# Patient Record
Sex: FEMALE | Race: WHITE | NOT HISPANIC OR LATINO | Employment: FULL TIME | URBAN - METROPOLITAN AREA
[De-identification: names, ages, dates, MRNs, and addresses within clinical notes are randomized per-mention and may not be internally consistent; named-entity substitution may affect disease eponyms.]

---

## 2019-05-13 LAB
HPV, HIGH-RISK: NEGATIVE
PAP SMEAR: NORMAL

## 2019-05-21 ENCOUNTER — OFFICE VISIT (OUTPATIENT)
Dept: FAMILY MEDICINE | Facility: CLINIC | Age: 44
End: 2019-05-21
Payer: COMMERCIAL

## 2019-05-21 ENCOUNTER — TELEPHONE (OUTPATIENT)
Dept: FAMILY MEDICINE | Facility: CLINIC | Age: 44
End: 2019-05-21

## 2019-05-21 VITALS
DIASTOLIC BLOOD PRESSURE: 80 MMHG | HEIGHT: 63 IN | BODY MASS INDEX: 33.68 KG/M2 | HEART RATE: 89 BPM | SYSTOLIC BLOOD PRESSURE: 129 MMHG | WEIGHT: 190.06 LBS

## 2019-05-21 DIAGNOSIS — Z00.00 WELLNESS EXAMINATION: Primary | ICD-10-CM

## 2019-05-21 DIAGNOSIS — H91.90 HEARING LOSS, UNSPECIFIED HEARING LOSS TYPE, UNSPECIFIED LATERALITY: ICD-10-CM

## 2019-05-21 PROCEDURE — 99999 PR PBB SHADOW E&M-EST. PATIENT-LVL III: CPT | Mod: PBBFAC,,, | Performed by: FAMILY MEDICINE

## 2019-05-21 PROCEDURE — 99999 PR PBB SHADOW E&M-EST. PATIENT-LVL III: ICD-10-PCS | Mod: PBBFAC,,, | Performed by: FAMILY MEDICINE

## 2019-05-21 PROCEDURE — 99386 PR PREVENTIVE VISIT,NEW,40-64: ICD-10-PCS | Mod: S$GLB,,, | Performed by: FAMILY MEDICINE

## 2019-05-21 PROCEDURE — 99386 PREV VISIT NEW AGE 40-64: CPT | Mod: S$GLB,,, | Performed by: FAMILY MEDICINE

## 2019-05-21 NOTE — TELEPHONE ENCOUNTER
----- Message from Cande Blankenship sent at 5/21/2019 11:22 AM CDT -----  Contact: self at check out  Dr. Schwartz referred this patient to Audiology for a hearing test. I booked for Thursday 5-23. I need a referral put in and attached to the appointment please.

## 2019-05-21 NOTE — PROGRESS NOTES
"Subjective:       Patient ID: Bibiana Go is a 44 y.o. female.    Chief Complaint: wellness check up    This pt is new to me and to this clinic. The pt presents for her wellness exam.  The pt has no significant past medical history.  She ten to twelve years ago was treated for PSVT which was thought to be stress related.  Otherwise the pt feels well and has no acute complaints.    Review of Systems   Constitutional: Negative for activity change and unexpected weight change.   HENT: Positive for hearing loss. Negative for rhinorrhea and trouble swallowing.    Eyes: Negative for discharge and visual disturbance.   Respiratory: Negative for chest tightness and wheezing.    Cardiovascular: Negative for chest pain and palpitations.   Gastrointestinal: Negative for blood in stool, constipation, diarrhea and vomiting.   Endocrine: Negative for polydipsia and polyuria.   Genitourinary: Negative for difficulty urinating, dysuria, hematuria and menstrual problem.   Musculoskeletal: Negative for arthralgias, joint swelling and neck pain.   Neurological: Negative for weakness and headaches.   Psychiatric/Behavioral: Negative for confusion and dysphoric mood.       Objective:       Vitals:    05/21/19 1030   BP: 129/80   BP Location: Right arm   Patient Position: Sitting   BP Method: Medium (Manual)   Pulse: 89   Weight: 86.2 kg (190 lb 0.6 oz)   Height: 5' 3" (1.6 m)     Physical Exam   Constitutional: She is oriented to person, place, and time. She appears well-developed and well-nourished.   HENT:   Head: Normocephalic.   Eyes: Pupils are equal, round, and reactive to light. Conjunctivae and EOM are normal.   Neck: Normal range of motion. Neck supple. No thyromegaly present.   Cardiovascular: Normal rate, regular rhythm and normal heart sounds.   Pulmonary/Chest: Effort normal and breath sounds normal.   Abdominal: Soft. Bowel sounds are normal. There is no tenderness.   Musculoskeletal: Normal range of motion. She " exhibits no tenderness or deformity.   Lymphadenopathy:     She has no cervical adenopathy.   Neurological: She is alert and oriented to person, place, and time. She displays normal reflexes. No cranial nerve deficit. She exhibits normal muscle tone. Coordination normal.   Skin: Skin is warm and dry.   Psychiatric: She has a normal mood and affect. Her behavior is normal.       Assessment:       1. Wellness examination    2. Hearing loss, unspecified hearing loss type, unspecified laterality        Plan:       Bibiana was seen today for wellness check up.    Diagnoses and all orders for this visit:    Wellness examination  -     CBC auto differential; Future  -     Comprehensive metabolic panel; Future  -     Lipid panel; Future  -     TSH; Future  -     Vitamin D; Future    Hearing loss, unspecified hearing loss type, unspecified laterality  -     COMP AUDIOMETRY TRESHOLDEVAL; Future      During this visit, I reviewed the pt's history, medications, allergies, and problem list.

## 2019-05-22 DIAGNOSIS — H91.90 HEARING LOSS, UNSPECIFIED HEARING LOSS TYPE, UNSPECIFIED LATERALITY: Primary | ICD-10-CM

## 2019-05-23 ENCOUNTER — CLINICAL SUPPORT (OUTPATIENT)
Dept: AUDIOLOGY | Facility: CLINIC | Age: 44
End: 2019-05-23
Payer: COMMERCIAL

## 2019-05-23 ENCOUNTER — LAB VISIT (OUTPATIENT)
Dept: LAB | Facility: HOSPITAL | Age: 44
End: 2019-05-23
Attending: FAMILY MEDICINE
Payer: COMMERCIAL

## 2019-05-23 ENCOUNTER — OFFICE VISIT (OUTPATIENT)
Dept: OTOLARYNGOLOGY | Facility: CLINIC | Age: 44
End: 2019-05-23
Payer: COMMERCIAL

## 2019-05-23 VITALS — HEIGHT: 63 IN | WEIGHT: 190.06 LBS | BODY MASS INDEX: 33.68 KG/M2

## 2019-05-23 DIAGNOSIS — Z00.00 WELLNESS EXAMINATION: ICD-10-CM

## 2019-05-23 DIAGNOSIS — H93.12 TINNITUS, LEFT: Primary | ICD-10-CM

## 2019-05-23 DIAGNOSIS — H91.20 SUDDEN-ONSET SENSORINEURAL HEARING LOSS: Primary | ICD-10-CM

## 2019-05-23 LAB
25(OH)D3+25(OH)D2 SERPL-MCNC: 36 NG/ML (ref 30–96)
ALBUMIN SERPL BCP-MCNC: 3.9 G/DL (ref 3.5–5.2)
ALP SERPL-CCNC: 68 U/L (ref 55–135)
ALT SERPL W/O P-5'-P-CCNC: 13 U/L (ref 10–44)
ANION GAP SERPL CALC-SCNC: 6 MMOL/L (ref 8–16)
AST SERPL-CCNC: 14 U/L (ref 10–40)
BASOPHILS # BLD AUTO: 0.04 K/UL (ref 0–0.2)
BASOPHILS NFR BLD: 0.6 % (ref 0–1.9)
BILIRUB SERPL-MCNC: 0.5 MG/DL (ref 0.1–1)
BUN SERPL-MCNC: 12 MG/DL (ref 6–20)
CALCIUM SERPL-MCNC: 9.8 MG/DL (ref 8.7–10.5)
CHLORIDE SERPL-SCNC: 104 MMOL/L (ref 95–110)
CHOLEST SERPL-MCNC: 169 MG/DL (ref 120–199)
CHOLEST/HDLC SERPL: 3 {RATIO} (ref 2–5)
CO2 SERPL-SCNC: 26 MMOL/L (ref 23–29)
CREAT SERPL-MCNC: 0.7 MG/DL (ref 0.5–1.4)
DIFFERENTIAL METHOD: NORMAL
EOSINOPHIL # BLD AUTO: 0.1 K/UL (ref 0–0.5)
EOSINOPHIL NFR BLD: 2.3 % (ref 0–8)
ERYTHROCYTE [DISTWIDTH] IN BLOOD BY AUTOMATED COUNT: 13.1 % (ref 11.5–14.5)
EST. GFR  (AFRICAN AMERICAN): >60 ML/MIN/1.73 M^2
EST. GFR  (NON AFRICAN AMERICAN): >60 ML/MIN/1.73 M^2
GLUCOSE SERPL-MCNC: 89 MG/DL (ref 70–110)
HCT VFR BLD AUTO: 37.8 % (ref 37–48.5)
HDLC SERPL-MCNC: 56 MG/DL (ref 40–75)
HDLC SERPL: 33.1 % (ref 20–50)
HGB BLD-MCNC: 12.3 G/DL (ref 12–16)
IMM GRANULOCYTES # BLD AUTO: 0.01 K/UL (ref 0–0.04)
IMM GRANULOCYTES NFR BLD AUTO: 0.2 % (ref 0–0.5)
LDLC SERPL CALC-MCNC: 99.6 MG/DL (ref 63–159)
LYMPHOCYTES # BLD AUTO: 2 K/UL (ref 1–4.8)
LYMPHOCYTES NFR BLD: 31.8 % (ref 18–48)
MCH RBC QN AUTO: 30.2 PG (ref 27–31)
MCHC RBC AUTO-ENTMCNC: 32.5 G/DL (ref 32–36)
MCV RBC AUTO: 93 FL (ref 82–98)
MONOCYTES # BLD AUTO: 0.5 K/UL (ref 0.3–1)
MONOCYTES NFR BLD: 7.6 % (ref 4–15)
NEUTROPHILS # BLD AUTO: 3.6 K/UL (ref 1.8–7.7)
NEUTROPHILS NFR BLD: 57.5 % (ref 38–73)
NONHDLC SERPL-MCNC: 113 MG/DL
NRBC BLD-RTO: 0 /100 WBC
PLATELET # BLD AUTO: 324 K/UL (ref 150–350)
PMV BLD AUTO: 10.5 FL (ref 9.2–12.9)
POTASSIUM SERPL-SCNC: 4.5 MMOL/L (ref 3.5–5.1)
PROT SERPL-MCNC: 7.4 G/DL (ref 6–8.4)
RBC # BLD AUTO: 4.07 M/UL (ref 4–5.4)
SODIUM SERPL-SCNC: 136 MMOL/L (ref 136–145)
TRIGL SERPL-MCNC: 67 MG/DL (ref 30–150)
TSH SERPL DL<=0.005 MIU/L-ACNC: 1.44 UIU/ML (ref 0.4–4)
WBC # BLD AUTO: 6.17 K/UL (ref 3.9–12.7)

## 2019-05-23 PROCEDURE — 80053 COMPREHEN METABOLIC PANEL: CPT

## 2019-05-23 PROCEDURE — 80061 LIPID PANEL: CPT

## 2019-05-23 PROCEDURE — 99999 PR PBB SHADOW E&M-EST. PATIENT-LVL I: CPT | Mod: PBBFAC,,,

## 2019-05-23 PROCEDURE — 3008F BODY MASS INDEX DOCD: CPT | Mod: CPTII,S$GLB,, | Performed by: NURSE PRACTITIONER

## 2019-05-23 PROCEDURE — 92567 TYMPANOMETRY: CPT | Mod: S$GLB,,, | Performed by: AUDIOLOGIST-HEARING AID FITTER

## 2019-05-23 PROCEDURE — 92557 COMPREHENSIVE HEARING TEST: CPT | Mod: S$GLB,,, | Performed by: AUDIOLOGIST-HEARING AID FITTER

## 2019-05-23 PROCEDURE — 36415 COLL VENOUS BLD VENIPUNCTURE: CPT | Mod: PO

## 2019-05-23 PROCEDURE — 99203 OFFICE O/P NEW LOW 30 MIN: CPT | Mod: S$GLB,,, | Performed by: NURSE PRACTITIONER

## 2019-05-23 PROCEDURE — 99999 PR PBB SHADOW E&M-EST. PATIENT-LVL I: ICD-10-PCS | Mod: PBBFAC,,,

## 2019-05-23 PROCEDURE — 84443 ASSAY THYROID STIM HORMONE: CPT

## 2019-05-23 PROCEDURE — 3008F PR BODY MASS INDEX (BMI) DOCUMENTED: ICD-10-PCS | Mod: CPTII,S$GLB,, | Performed by: NURSE PRACTITIONER

## 2019-05-23 PROCEDURE — 99999 PR PBB SHADOW E&M-EST. PATIENT-LVL III: ICD-10-PCS | Mod: PBBFAC,,, | Performed by: NURSE PRACTITIONER

## 2019-05-23 PROCEDURE — 82306 VITAMIN D 25 HYDROXY: CPT

## 2019-05-23 PROCEDURE — 99203 PR OFFICE/OUTPT VISIT, NEW, LEVL III, 30-44 MIN: ICD-10-PCS | Mod: S$GLB,,, | Performed by: NURSE PRACTITIONER

## 2019-05-23 PROCEDURE — 92557 PR COMPREHENSIVE HEARING TEST: ICD-10-PCS | Mod: S$GLB,,, | Performed by: AUDIOLOGIST-HEARING AID FITTER

## 2019-05-23 PROCEDURE — 85025 COMPLETE CBC W/AUTO DIFF WBC: CPT

## 2019-05-23 PROCEDURE — 99999 PR PBB SHADOW E&M-EST. PATIENT-LVL III: CPT | Mod: PBBFAC,,, | Performed by: NURSE PRACTITIONER

## 2019-05-23 PROCEDURE — 92567 PR TYMPA2METRY: ICD-10-PCS | Mod: S$GLB,,, | Performed by: AUDIOLOGIST-HEARING AID FITTER

## 2019-05-23 RX ORDER — PREDNISONE 10 MG/1
TABLET ORAL
Qty: 42 TABLET | Refills: 0 | Status: SHIPPED | OUTPATIENT
Start: 2019-05-23 | End: 2019-06-04

## 2019-05-23 NOTE — PROGRESS NOTES
Bibiana Go was seen 05/23/2019 for an audiological evaluation. Patient complains of hearing loss AS>AD x 1 month. Pt reports her family feels her hearing is getting worse. When she listens to the phone with her left ear, she can't hear well and needs to switch ears.     Results reveal normal hearing from 250-8000Hz bilaterally with an asymmetrical component AS>AD.    Speech Reception Thresholds were  0 dBHL for the right ear and 5 dBHL for the left ear.    Word recognition scores were excellent bilaterally.   Tympanograms were Type A for the right ear and Type A for the left ear.    Audiogram results were reviewed in detail with patient and all questions were answered. Results will be reviewed by ENT at the completion of this note. Recommend further medical eval for the asymmetry,  repeat hearing test following Tx and hearing protection in loud noise.

## 2019-05-23 NOTE — PROGRESS NOTES
"Subjective:       Patient ID: Bibiana Go is a 44 y.o. female.    Chief Complaint: Hearing Loss    HPI   Patient was referred direct to audiology by Dr. AREN Schwartz. Audiology saw her today and noted an asymmetry. Patient reported that her unilateral hearing loss was recent and sudden-onset. Per our protocol, audiology notified ENT and we saw her in today on an urgent basis to workup a unilateral sudden-onset sensorineural hearing loss in the absence of any other (+) aural findings.   Patient reports her left-sided hearing decreased suddenly 3-4 weeks ago and has yet to return. She also reports a very noticeable left-sided tinnitus X 3-4 weeks.     Review of Systems   Constitutional: Negative.    HENT: Positive for hearing loss and tinnitus.    Eyes: Negative.    Respiratory: Negative.    Cardiovascular: Negative.    Gastrointestinal: Negative.    Musculoskeletal: Negative.    Skin: Negative.    Neurological: Negative.    Hematological: Negative.    Psychiatric/Behavioral: Negative.        Objective:      Physical Exam   Constitutional: She is oriented to person, place, and time. Vital signs are normal. She appears well-developed and well-nourished. She is cooperative. She does not appear ill. No distress.   HENT:   Head: Normocephalic and atraumatic.   Right Ear: Hearing, tympanic membrane, external ear and ear canal normal. Tympanic membrane is not erythematous. Tympanic membrane mobility is normal. No middle ear effusion.   Left Ear: Hearing, tympanic membrane, external ear and ear canal normal. Tympanic membrane is not erythematous. Tympanic membrane mobility is normal.  No middle ear effusion.   Nose: Nose normal.   Mouth/Throat: Uvula is midline, oropharynx is clear and moist and mucous membranes are normal.   Type "A" tympanograms consistent with well-pneumatized mesotympanums and absence of middle ear effusions AU   Eyes: EOM and lids are normal. Right eye exhibits no discharge. Left eye exhibits no " discharge. No scleral icterus.   Neck: Trachea normal and normal range of motion. Neck supple. No tracheal deviation present.   Cardiovascular: Normal rate.   Pulmonary/Chest: Effort normal. No stridor. No respiratory distress. She has no wheezes.   Musculoskeletal: Normal range of motion.   Neurological: She is alert and oriented to person, place, and time. She has normal strength. Coordination and gait normal.   Skin: Skin is warm, dry and intact. She is not diaphoretic. No cyanosis. No pallor.   Psychiatric: She has a normal mood and affect. Her speech is normal and behavior is normal. Judgment and thought content normal. Cognition and memory are normal.   Nursing note and vitals reviewed.       Assessment:       1. Sudden-onset sensorineural hearing loss        Plan:     Though findings are not substantial on audiogram, patient reports substantially noticeable subjective symptoms. Therefore we will proceed per protocol with prednisone taper and repeat audiogram in 3-4 weeks.     I discussed the risks of corticosteroid use directly with the patient. This includes increased appetite with weight change, increased blood pressure, decreased sleep, upset stomach (possible ulcers), pancreatitis, glucose/metabolism changes, mood changes/irritability, immunosuppression, acute glaucoma, osteoporosis, and rarely osteonecrosis. Patient understood and would like to proceed with treatment.

## 2019-05-23 NOTE — Clinical Note
Thank you for your referral to audiology. Ms Go has normal hearing but an asymmetrical hearing loss was discovered that needed further examination from ENT due to the sudden nature with which it occurred. She saw Germania Appiah immediately following this encounter to try Tx to see if her hearing improves. Tx for sudden hearing loss is recommended as soon as the loss is discovered.

## 2019-05-31 DIAGNOSIS — Z12.39 BREAST CANCER SCREENING: ICD-10-CM

## 2019-06-25 ENCOUNTER — TELEPHONE (OUTPATIENT)
Dept: AUDIOLOGY | Facility: CLINIC | Age: 44
End: 2019-06-25

## 2019-06-25 NOTE — TELEPHONE ENCOUNTER
FIDELIAOV for the patient to call back so we can schedule her.    Attempting to schedule her on Friday 06/28 in the afternoon for an AUDIO ONLY      ----- Message from Payam Nichole sent at 6/25/2019 11:16 AM CDT -----  Type: Needs Medical Advice    Who Called:  Patient    Best Call Back Number: [phone]  Additional Information: Patient states that she would like a callback to schedule a hearing test.  [facility] would not allow me to schedule.

## 2019-06-28 ENCOUNTER — CLINICAL SUPPORT (OUTPATIENT)
Dept: AUDIOLOGY | Facility: CLINIC | Age: 44
End: 2019-06-28
Payer: COMMERCIAL

## 2019-06-28 DIAGNOSIS — D33.3 LEFT ACOUSTIC NEUROMA: Primary | ICD-10-CM

## 2019-06-28 DIAGNOSIS — H90.3 ASYMMETRICAL SENSORINEURAL HEARING LOSS: ICD-10-CM

## 2019-06-28 PROCEDURE — 92567 TYMPANOMETRY: CPT | Mod: S$GLB,,, | Performed by: AUDIOLOGIST

## 2019-06-28 PROCEDURE — 92557 PR COMPREHENSIVE HEARING TEST: ICD-10-PCS | Mod: S$GLB,,, | Performed by: AUDIOLOGIST

## 2019-06-28 PROCEDURE — 92557 COMPREHENSIVE HEARING TEST: CPT | Mod: S$GLB,,, | Performed by: AUDIOLOGIST

## 2019-06-28 PROCEDURE — 92567 PR TYMPA2METRY: ICD-10-PCS | Mod: S$GLB,,, | Performed by: AUDIOLOGIST

## 2019-06-28 PROCEDURE — 99999 PR PBB SHADOW E&M-EST. PATIENT-LVL I: ICD-10-PCS | Mod: PBBFAC,,,

## 2019-06-28 PROCEDURE — 99999 PR PBB SHADOW E&M-EST. PATIENT-LVL I: CPT | Mod: PBBFAC,,,

## 2019-07-01 ENCOUNTER — TELEPHONE (OUTPATIENT)
Dept: ADMINISTRATIVE | Facility: HOSPITAL | Age: 44
End: 2019-07-01

## 2019-07-01 ENCOUNTER — PATIENT MESSAGE (OUTPATIENT)
Dept: ADMINISTRATIVE | Facility: HOSPITAL | Age: 44
End: 2019-07-01

## 2019-07-01 NOTE — LETTER
July 1, 2019    Dr Bales             [facility]  [address]  [facility] [address]  Phone: [phone] July 1, 2019     Patient: Bibiana Go    YOB: 1975   Date of Visit: 7/1/2019       To Whom It May Concern:                                                                                    [facility]    We are seeing Bibiana Go in the clinic today at [facility].  PATRICK Scwhartz MD is their PCP.  She/He has an outstanding lab/procedure at this time when reviewing their chart.  To help with our Health Maintenance records will you please supply the following:      [x]  Mammogram                                                   [x]  Pap Smear                                                                                                Please Fax to [facility] at [phone]    Thank you for your help, Dustin Hill LPN-[facility].  If I can be of any assistance you can call at [phone]        [facility]         If you have any questions or concerns, please don't hesitate to call.    Sincerely,        PATRICK Schwartz MD

## 2019-07-01 NOTE — LETTER
July 1, 2019    Dr Bales             [facility]  [address]  [facility] [address]  Phone: [phone] July 1, 2019     Patient: Bibiana Go    YOB: 1975   Date of Visit: 7/1/2019       To Whom It May Concern:                                                                                        [facility]    We are seeing Bibiana Go in the clinic today at [facility].  PATRICK Schwartz MD is their PCP.  She/He has an outstanding lab/procedure at this time when reviewing their chart.  To help with our Health Maintenance records will you please supply the following:      []  Mammogram                                                []  Colonoscopy   []  Pap Smear                                                    []  Outside Lab Results   []  Dexa scans                                                   []  Eye Exam   []  Foot Exam                                                     [] Other___________   []  Outside Immunizations                                              Please Fax to [facility] at [phone]    Thank you for your help, Dustin Hill LPN-[facility].  If I can be of any assistance you can call at [phone]        [facility]         If you have any questions or concerns, please don't hesitate to call.    Sincerely,        PATRICK Schwartz MD

## 2019-07-03 ENCOUNTER — TELEPHONE (OUTPATIENT)
Dept: ADMINISTRATIVE | Facility: HOSPITAL | Age: 44
End: 2019-07-03

## 2019-07-09 ENCOUNTER — PATIENT MESSAGE (OUTPATIENT)
Dept: OTOLARYNGOLOGY | Facility: CLINIC | Age: 44
End: 2019-07-09

## 2019-07-18 PROBLEM — N93.9 ABNORMAL UTERINE BLEEDING: Status: ACTIVE | Noted: 2019-07-18

## 2019-08-16 ENCOUNTER — TELEPHONE (OUTPATIENT)
Dept: OTOLARYNGOLOGY | Facility: CLINIC | Age: 44
End: 2019-08-16

## 2019-08-19 ENCOUNTER — OFFICE VISIT (OUTPATIENT)
Dept: OTOLARYNGOLOGY | Facility: CLINIC | Age: 44
End: 2019-08-19
Payer: COMMERCIAL

## 2019-08-19 VITALS — HEIGHT: 64 IN | WEIGHT: 185.44 LBS | BODY MASS INDEX: 31.66 KG/M2

## 2019-08-19 DIAGNOSIS — D33.3 VESTIBULAR SCHWANNOMA: Primary | ICD-10-CM

## 2019-08-19 PROCEDURE — 99999 PR PBB SHADOW E&M-EST. PATIENT-LVL II: CPT | Mod: PBBFAC,,, | Performed by: OTOLARYNGOLOGY

## 2019-08-19 PROCEDURE — 3008F BODY MASS INDEX DOCD: CPT | Mod: CPTII,S$GLB,, | Performed by: OTOLARYNGOLOGY

## 2019-08-19 PROCEDURE — 3008F PR BODY MASS INDEX (BMI) DOCUMENTED: ICD-10-PCS | Mod: CPTII,S$GLB,, | Performed by: OTOLARYNGOLOGY

## 2019-08-19 PROCEDURE — 99215 PR OFFICE/OUTPT VISIT, EST, LEVL V, 40-54 MIN: ICD-10-PCS | Mod: S$GLB,,, | Performed by: OTOLARYNGOLOGY

## 2019-08-19 PROCEDURE — 99215 OFFICE O/P EST HI 40 MIN: CPT | Mod: S$GLB,,, | Performed by: OTOLARYNGOLOGY

## 2019-08-19 PROCEDURE — 99999 PR PBB SHADOW E&M-EST. PATIENT-LVL II: ICD-10-PCS | Mod: PBBFAC,,, | Performed by: OTOLARYNGOLOGY

## 2019-08-19 RX ORDER — IBUPROFEN 200 MG
400 TABLET ORAL EVERY 6 HOURS PRN
COMMUNITY

## 2019-08-19 NOTE — PROGRESS NOTES
Otology/Neurotology Consultation Report       Chief Complaint: incidentally found vestibular schwannoma left    History of Present Illness: 44 y.o.  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 The patient reports that she started noticing a slight difficulty hearing on the left about 6 months ago. She was seen by her PCP who initially gave her steroids which did not improve it. She then underwent an MRI which revealed a 1 x 2.1 cm mass of the left IAC. She was referred here for further evaluation. She denies dizziness, headaches, or tinnitus. No family history of AN. No prior ear surgery. Ref Germania Appiah.    Review of Systems   Constitutional: Negative for chills and fever.   HENT: Positive for hearing loss. Negative for ear discharge, ear pain, sore throat and tinnitus.    Eyes: Negative for double vision and photophobia.   Respiratory: Negative for cough, wheezing and stridor.    Cardiovascular: Negative for chest pain and palpitations.   Gastrointestinal: Negative for nausea and vomiting.   Genitourinary: Negative for dysuria and urgency.   Musculoskeletal: Negative for myalgias and neck pain.   Skin: Negative for itching and rash.   Neurological: Negative for dizziness and headaches.          Past Medical History: Patient has a past medical history of Acoustic neuroma, Asthma, and History of PSVT (paroxysmal supraventricular tachycardia).    Past Surgical History: Patient has a past surgical history that includes  section;  section with tubal ligation; Robot-assisted laparoscopic hysterectomy (N/A, 2019); Cystoscopy (N/A, 2019); Robot-assisted salpingectomy (Bilateral, 2019); and Robot-assisted laparoscopic oophorectomy (Right, 2019).    Social History: Patient reports that she has never smoked. She has never used smokeless tobacco. She reports that she drinks alcohol. She reports that she does not use drugs.    Family History: family history  includes Cancer in her maternal aunt and paternal aunt; Diabetes in her father; Heart disease in her father; Hyperlipidemia in her mother.    Medications:   Current Outpatient Medications on File Prior to Visit   Medication Sig Dispense Refill    ibuprofen (ADVIL,MOTRIN) 200 MG tablet Take 400 mg by mouth every 6 (six) hours as needed for Pain.       No current facility-administered medications on file prior to visit.          Allergies: Patient has No Known Allergies.    Physical Exam   Constitutional: She is oriented to person, place, and time and well-developed, well-nourished, and in no distress.   HENT:   Head: Normocephalic and atraumatic.   Right Ear: Tympanic membrane, external ear and ear canal normal.   Left Ear: Tympanic membrane, external ear and ear canal normal.   Nose: Nose normal. No mucosal edema or rhinorrhea.   Mouth/Throat: Uvula is midline, oropharynx is clear and moist and mucous membranes are normal.   Eyes: Pupils are equal, round, and reactive to light. EOM are normal.   Neck: Normal range of motion. Neck supple.   Pulmonary/Chest: Effort normal. No respiratory distress.   Neurological: She is oriented to person, place, and time.   Skin: Skin is warm and dry.               MRI IAC w/ and w/o contrast 6/28/19: 10 x 21 x 9 mm avidly enhancing lesion in the left cerebellopontine angle extending into the left internal auditory canal, likely a vestibular schwannoma.               Bibiana was seen today for hearing loss and other.    Diagnoses and all orders for this visit:    Vestibular schwannoma AS    Long talk.    Disc obs, XRT, Microsurg. Considering age/ size would lean to MS. She would like to wait for now.    Repeat MRI in 6 months.    RTC then.

## 2020-02-19 ENCOUNTER — PATIENT MESSAGE (OUTPATIENT)
Dept: OTOLARYNGOLOGY | Facility: CLINIC | Age: 45
End: 2020-02-19

## 2020-02-19 DIAGNOSIS — D33.3 UNILATERAL VESTIBULAR SCHWANNOMA: Primary | ICD-10-CM

## 2020-03-03 ENCOUNTER — PATIENT MESSAGE (OUTPATIENT)
Dept: OTOLARYNGOLOGY | Facility: CLINIC | Age: 45
End: 2020-03-03

## 2020-03-05 NOTE — TELEPHONE ENCOUNTER
----- Message from Germania Appiah NP sent at 3/3/2020  2:03 PM CST -----  Enhancing vestibular schwannoma (10 x 21 x 9 mm) is entirely unchanged in size compared to the prior examination.    Structures of the membranous labyrinth are normal bilaterally.

## 2020-10-05 ENCOUNTER — PATIENT MESSAGE (OUTPATIENT)
Dept: ADMINISTRATIVE | Facility: HOSPITAL | Age: 45
End: 2020-10-05

## 2021-01-04 ENCOUNTER — PATIENT MESSAGE (OUTPATIENT)
Dept: ADMINISTRATIVE | Facility: HOSPITAL | Age: 46
End: 2021-01-04

## 2021-01-06 DIAGNOSIS — Z12.31 OTHER SCREENING MAMMOGRAM: ICD-10-CM

## 2021-04-06 ENCOUNTER — PATIENT MESSAGE (OUTPATIENT)
Dept: ADMINISTRATIVE | Facility: HOSPITAL | Age: 46
End: 2021-04-06

## 2021-07-07 ENCOUNTER — PATIENT MESSAGE (OUTPATIENT)
Dept: ADMINISTRATIVE | Facility: HOSPITAL | Age: 46
End: 2021-07-07

## 2021-07-09 ENCOUNTER — IMMUNIZATION (OUTPATIENT)
Dept: FAMILY MEDICINE | Facility: CLINIC | Age: 46
End: 2021-07-09
Payer: COMMERCIAL

## 2021-07-09 DIAGNOSIS — Z23 NEED FOR VACCINATION: Primary | ICD-10-CM

## 2021-07-09 PROCEDURE — 91300 COVID-19, MRNA, LNP-S, PF, 30 MCG/0.3 ML DOSE VACCINE: CPT | Mod: PBBFAC | Performed by: FAMILY MEDICINE

## 2021-07-30 ENCOUNTER — IMMUNIZATION (OUTPATIENT)
Dept: FAMILY MEDICINE | Facility: CLINIC | Age: 46
End: 2021-07-30
Payer: COMMERCIAL

## 2021-07-30 DIAGNOSIS — Z23 NEED FOR VACCINATION: Primary | ICD-10-CM

## 2021-07-30 PROCEDURE — 0002A COVID-19, MRNA, LNP-S, PF, 30 MCG/0.3 ML DOSE VACCINE: CPT | Mod: PBBFAC | Performed by: FAMILY MEDICINE

## 2021-07-30 PROCEDURE — 91300 COVID-19, MRNA, LNP-S, PF, 30 MCG/0.3 ML DOSE VACCINE: CPT | Mod: PBBFAC | Performed by: FAMILY MEDICINE

## 2023-04-03 ENCOUNTER — LAB VISIT (OUTPATIENT)
Dept: LAB | Facility: HOSPITAL | Age: 48
End: 2023-04-03
Attending: OBSTETRICS & GYNECOLOGY
Payer: COMMERCIAL

## 2023-04-03 ENCOUNTER — OFFICE VISIT (OUTPATIENT)
Dept: OBSTETRICS AND GYNECOLOGY | Facility: CLINIC | Age: 48
End: 2023-04-03
Payer: COMMERCIAL

## 2023-04-03 VITALS
BODY MASS INDEX: 35.7 KG/M2 | HEIGHT: 62 IN | DIASTOLIC BLOOD PRESSURE: 88 MMHG | RESPIRATION RATE: 14 BRPM | SYSTOLIC BLOOD PRESSURE: 146 MMHG | WEIGHT: 194 LBS

## 2023-04-03 DIAGNOSIS — R53.83 FATIGUE, UNSPECIFIED TYPE: ICD-10-CM

## 2023-04-03 DIAGNOSIS — Z12.31 ENCOUNTER FOR SCREENING MAMMOGRAM FOR MALIGNANT NEOPLASM OF BREAST: ICD-10-CM

## 2023-04-03 DIAGNOSIS — Z01.419 ENCOUNTER FOR ANNUAL ROUTINE GYNECOLOGICAL EXAMINATION: ICD-10-CM

## 2023-04-03 DIAGNOSIS — Z12.4 SCREENING FOR MALIGNANT NEOPLASM OF CERVIX: Primary | ICD-10-CM

## 2023-04-03 LAB
BASOPHILS # BLD AUTO: 0.06 K/UL (ref 0–0.2)
BASOPHILS NFR BLD: 0.7 % (ref 0–1.9)
DIFFERENTIAL METHOD: NORMAL
EOSINOPHIL # BLD AUTO: 0.2 K/UL (ref 0–0.5)
EOSINOPHIL NFR BLD: 2 % (ref 0–8)
ERYTHROCYTE [DISTWIDTH] IN BLOOD BY AUTOMATED COUNT: 11.9 % (ref 11.5–14.5)
ESTRADIOL SERPL-MCNC: 85 PG/ML
FSH SERPL-ACNC: 7.14 MIU/ML
HCT VFR BLD AUTO: 41.5 % (ref 37–48.5)
HGB BLD-MCNC: 13.3 G/DL (ref 12–16)
IMM GRANULOCYTES # BLD AUTO: 0.01 K/UL (ref 0–0.04)
IMM GRANULOCYTES NFR BLD AUTO: 0.1 % (ref 0–0.5)
LYMPHOCYTES # BLD AUTO: 2.1 K/UL (ref 1–4.8)
LYMPHOCYTES NFR BLD: 24.5 % (ref 18–48)
MCH RBC QN AUTO: 30.9 PG (ref 27–31)
MCHC RBC AUTO-ENTMCNC: 32 G/DL (ref 32–36)
MCV RBC AUTO: 96 FL (ref 82–98)
MONOCYTES # BLD AUTO: 0.5 K/UL (ref 0.3–1)
MONOCYTES NFR BLD: 5.9 % (ref 4–15)
NEUTROPHILS # BLD AUTO: 5.8 K/UL (ref 1.8–7.7)
NEUTROPHILS NFR BLD: 66.8 % (ref 38–73)
NRBC BLD-RTO: 0 /100 WBC
PLATELET # BLD AUTO: 330 K/UL (ref 150–450)
PMV BLD AUTO: 9.9 FL (ref 9.2–12.9)
RBC # BLD AUTO: 4.31 M/UL (ref 4–5.4)
T4 FREE SERPL-MCNC: 0.94 NG/DL (ref 0.71–1.51)
TSH SERPL DL<=0.005 MIU/L-ACNC: 1.14 UIU/ML (ref 0.4–4)
WBC # BLD AUTO: 8.65 K/UL (ref 3.9–12.7)

## 2023-04-03 PROCEDURE — 99386 PR PREVENTIVE VISIT,NEW,40-64: ICD-10-PCS | Mod: S$GLB,,, | Performed by: OBSTETRICS & GYNECOLOGY

## 2023-04-03 PROCEDURE — 82670 ASSAY OF TOTAL ESTRADIOL: CPT | Performed by: OBSTETRICS & GYNECOLOGY

## 2023-04-03 PROCEDURE — 84443 ASSAY THYROID STIM HORMONE: CPT | Performed by: OBSTETRICS & GYNECOLOGY

## 2023-04-03 PROCEDURE — 99999 PR PBB SHADOW E&M-EST. PATIENT-LVL III: CPT | Mod: PBBFAC,,, | Performed by: OBSTETRICS & GYNECOLOGY

## 2023-04-03 PROCEDURE — 84439 ASSAY OF FREE THYROXINE: CPT | Performed by: OBSTETRICS & GYNECOLOGY

## 2023-04-03 PROCEDURE — 99386 PREV VISIT NEW AGE 40-64: CPT | Mod: S$GLB,,, | Performed by: OBSTETRICS & GYNECOLOGY

## 2023-04-03 PROCEDURE — 99999 PR PBB SHADOW E&M-EST. PATIENT-LVL III: ICD-10-PCS | Mod: PBBFAC,,, | Performed by: OBSTETRICS & GYNECOLOGY

## 2023-04-03 PROCEDURE — 3077F PR MOST RECENT SYSTOLIC BLOOD PRESSURE >= 140 MM HG: ICD-10-PCS | Mod: CPTII,S$GLB,, | Performed by: OBSTETRICS & GYNECOLOGY

## 2023-04-03 PROCEDURE — 36415 COLL VENOUS BLD VENIPUNCTURE: CPT | Mod: PN | Performed by: OBSTETRICS & GYNECOLOGY

## 2023-04-03 PROCEDURE — 1160F PR REVIEW ALL MEDS BY PRESCRIBER/CLIN PHARMACIST DOCUMENTED: ICD-10-PCS | Mod: CPTII,S$GLB,, | Performed by: OBSTETRICS & GYNECOLOGY

## 2023-04-03 PROCEDURE — 83001 ASSAY OF GONADOTROPIN (FSH): CPT | Performed by: OBSTETRICS & GYNECOLOGY

## 2023-04-03 PROCEDURE — 1159F MED LIST DOCD IN RCRD: CPT | Mod: CPTII,S$GLB,, | Performed by: OBSTETRICS & GYNECOLOGY

## 2023-04-03 PROCEDURE — 3079F DIAST BP 80-89 MM HG: CPT | Mod: CPTII,S$GLB,, | Performed by: OBSTETRICS & GYNECOLOGY

## 2023-04-03 PROCEDURE — 1159F PR MEDICATION LIST DOCUMENTED IN MEDICAL RECORD: ICD-10-PCS | Mod: CPTII,S$GLB,, | Performed by: OBSTETRICS & GYNECOLOGY

## 2023-04-03 PROCEDURE — 1160F RVW MEDS BY RX/DR IN RCRD: CPT | Mod: CPTII,S$GLB,, | Performed by: OBSTETRICS & GYNECOLOGY

## 2023-04-03 PROCEDURE — 85025 COMPLETE CBC W/AUTO DIFF WBC: CPT | Performed by: OBSTETRICS & GYNECOLOGY

## 2023-04-03 PROCEDURE — 3079F PR MOST RECENT DIASTOLIC BLOOD PRESSURE 80-89 MM HG: ICD-10-PCS | Mod: CPTII,S$GLB,, | Performed by: OBSTETRICS & GYNECOLOGY

## 2023-04-03 PROCEDURE — 3008F PR BODY MASS INDEX (BMI) DOCUMENTED: ICD-10-PCS | Mod: CPTII,S$GLB,, | Performed by: OBSTETRICS & GYNECOLOGY

## 2023-04-03 PROCEDURE — 3077F SYST BP >= 140 MM HG: CPT | Mod: CPTII,S$GLB,, | Performed by: OBSTETRICS & GYNECOLOGY

## 2023-04-03 PROCEDURE — 3008F BODY MASS INDEX DOCD: CPT | Mod: CPTII,S$GLB,, | Performed by: OBSTETRICS & GYNECOLOGY

## 2023-04-03 NOTE — PROGRESS NOTES
Chief Complaint   Patient presents with    Well Woman       History and Physical:  Patient's last menstrual period was 2019 (exact date).       Bibiana Go is a 47 y.o. WF who presents today for her routine annual GYN exam. The patient has no Gynecology complaints today. Prev hy2019      Allergies: Review of patient's allergies indicates:  No Known Allergies    Past Medical History:   Diagnosis Date    Acoustic neuroma     Asthma     as a child    History of PSVT (paroxysmal supraventricular tachycardia)     no longer needs medication       Past Surgical History:   Procedure Laterality Date     SECTION       SECTION WITH TUBAL LIGATION      CYSTOSCOPY N/A 2019    Procedure: CYSTOSCOPY;  Surgeon: Francisco Javier Bales MD;  Location: [facility] OR;  Service: OB/GYN;  Laterality: N/A;    HYSTERECTOMY  2019    OOPHORECTOMY  2019    one removed w/Hysterectomy    ROBOT-ASSISTED LAPAROSCOPIC HYSTERECTOMY N/A 2019    Procedure: ROBOTIC HYSTERECTOMY;  Surgeon: Francisco Javier Bales MD;  Location: [facility] OR;  Service: OB/GYN;  Laterality: N/A;    ROBOT-ASSISTED LAPAROSCOPIC OOPHORECTOMY Right 2019    Procedure: ROBOTIC OOPHORECTOMY;  Surgeon: Francisco Javier Bales MD;  Location: [facility] OR;  Service: OB/GYN;  Laterality: Right;    ROBOT-ASSISTED SALPINGECTOMY Bilateral 2019    Procedure: ROBOTIC SALPINGECTOMY;  Surgeon: Francisco Javier Bales MD;  Location: [facility] OR;  Service: OB/GYN;  Laterality: Bilateral;       MEDS:   Current Outpatient Medications on File Prior to Visit   Medication Sig Dispense Refill    ibuprofen (ADVIL,MOTRIN) 200 MG tablet Take 400 mg by mouth every 6 (six) hours as needed for Pain.       No current facility-administered medications on file prior to visit.       OB History          2    Para   2    Term   2            AB        Living             SAB        IAB        Ectopic        Multiple        Live Births                     Social History  "    Socioeconomic History    Marital status:     Number of children: 2   Occupational History    Occupation:  at [employer]   Tobacco Use    Smoking status: Never    Smokeless tobacco: Never   Substance and Sexual Activity    Alcohol use: Yes     Comment: occasionally    Drug use: Never    Sexual activity: Yes     Partners: Male       Family History   Problem Relation Age of Onset    Hyperlipidemia Mother     Diabetes Father     Heart disease Father     Breast cancer Maternal Aunt 45    Breast cancer Paternal Aunt 42         Past medical and surgical history reviewed.   I have reviewed the patient's medical history in detail and updated the computerized patient record.        Review of System:   General: no chills, fever, night sweats, weight gain or weight loss  Psychological: no depression or suicidal ideation  Breasts: no new or changing breast lumps, nipple discharge or masses.  Respiratory: no cough, shortness of breath, or wheezing  Cardiovascular: no chest pain or dyspnea on exertion  Gastrointestinal: no abdominal pain, change in bowel habits, or black or bloody stools  Genito-Urinary: no incontinence, urinary frequency/urgency or vulvar/vaginal symptoms, pelvic pain or abnormal vaginal bleeding.  Musculoskeletal: no gait disturbance or muscular weakness      Physical Exam:   BP (!) 146/88   Resp 14   Ht 5' 2" (1.575 m)   Wt 88 kg (194 lb 0.1 oz)   LMP 06/18/2019 (Exact Date)   BMI 35.48 kg/m²   Constitutional: She is oriented to person, place, and time. She appears well-developed and well-nourished. No distress.  HENT:   Head: Normocephalic and atraumatic.   Eyes: Conjunctivae and EOM are normal. No scleral icterus.   Neck: Normal range of motion. Neck supple. No tracheal deviation present.   Cardiovascular: Normal rate.    Pulmonary/Chest: Effort normal. No respiratory distress. She exhibits no tenderness.  Breasts: are symmetrical.no masses    Right breast exhibits no inverted nipple, " no mass, no nipple discharge, no skin change and no tenderness.   Left breast exhibits no inverted nipple, no mass, no nipple discharge, no skin change and no tenderness.  Abdominal: Soft. She exhibits no distension and no mass. There is no tenderness. There is no rebound and no guarding.   Genitourinary:    External rectal exam shows no thrombosed external hemorrhoids.    Pelvic exam was performed with patient supine.   No labial fusion.   There is no rash, lesion or injury on the right labia.   There is no rash, lesion or injury on the left labia.   No bleeding and no signs of injury around the vaginal introitus, urethra is without lesions and well supported.    No vaginal discharge found.    No significant Cystocele, Enterocele or rectocele, and cuff well supported.   Bimanual exam:   The urethra and vagina are without palpable masses or tenderness.   Uterus and cervix are surgically absents, vaginal cuff is intact and well supported.   Right adnexum displays no mass and no tenderness.   Left adnexum displays no mass and no tenderness.  Musculoskeletal: Normal range of motion.   Lymphadenopathy: No inguinal adenopathy present.   Neurological: She is alert and oriented to person, place, and time. Coordination normal.   Skin: Skin is warm and dry. She is not diaphoretic.   Psychiatric: She has a normal mood and affect.        Assessment:   Normal annual GYN exam  1. Screening for malignant neoplasm of cervix        2. Encounter for screening mammogram for malignant neoplasm of breast        3. Encounter for annual routine gynecological examination          Prev hyst and RSO  Fatigue, anxiety  Plan:   Consider anxiolytic  Estradiol, FSH, TSH, T4, CBC  PAP  Not Needed  Mammogram  Follow up in 4 weeks

## 2023-05-01 ENCOUNTER — OFFICE VISIT (OUTPATIENT)
Dept: OBSTETRICS AND GYNECOLOGY | Facility: CLINIC | Age: 48
End: 2023-05-01
Payer: COMMERCIAL

## 2023-05-01 VITALS
DIASTOLIC BLOOD PRESSURE: 78 MMHG | WEIGHT: 189.81 LBS | BODY MASS INDEX: 34.93 KG/M2 | RESPIRATION RATE: 14 BRPM | HEIGHT: 62 IN | SYSTOLIC BLOOD PRESSURE: 122 MMHG

## 2023-05-01 DIAGNOSIS — F41.9 ANXIETY: ICD-10-CM

## 2023-05-01 DIAGNOSIS — R53.83 FATIGUE, UNSPECIFIED TYPE: Primary | ICD-10-CM

## 2023-05-01 PROCEDURE — 3078F PR MOST RECENT DIASTOLIC BLOOD PRESSURE < 80 MM HG: ICD-10-PCS | Mod: CPTII,S$GLB,, | Performed by: OBSTETRICS & GYNECOLOGY

## 2023-05-01 PROCEDURE — 3074F SYST BP LT 130 MM HG: CPT | Mod: CPTII,S$GLB,, | Performed by: OBSTETRICS & GYNECOLOGY

## 2023-05-01 PROCEDURE — 1159F MED LIST DOCD IN RCRD: CPT | Mod: CPTII,S$GLB,, | Performed by: OBSTETRICS & GYNECOLOGY

## 2023-05-01 PROCEDURE — 99213 PR OFFICE/OUTPT VISIT, EST, LEVL III, 20-29 MIN: ICD-10-PCS | Mod: S$GLB,,, | Performed by: OBSTETRICS & GYNECOLOGY

## 2023-05-01 PROCEDURE — 99213 OFFICE O/P EST LOW 20 MIN: CPT | Mod: S$GLB,,, | Performed by: OBSTETRICS & GYNECOLOGY

## 2023-05-01 PROCEDURE — 3008F PR BODY MASS INDEX (BMI) DOCUMENTED: ICD-10-PCS | Mod: CPTII,S$GLB,, | Performed by: OBSTETRICS & GYNECOLOGY

## 2023-05-01 PROCEDURE — 3074F PR MOST RECENT SYSTOLIC BLOOD PRESSURE < 130 MM HG: ICD-10-PCS | Mod: CPTII,S$GLB,, | Performed by: OBSTETRICS & GYNECOLOGY

## 2023-05-01 PROCEDURE — 1159F PR MEDICATION LIST DOCUMENTED IN MEDICAL RECORD: ICD-10-PCS | Mod: CPTII,S$GLB,, | Performed by: OBSTETRICS & GYNECOLOGY

## 2023-05-01 PROCEDURE — 99999 PR PBB SHADOW E&M-EST. PATIENT-LVL III: ICD-10-PCS | Mod: PBBFAC,,, | Performed by: OBSTETRICS & GYNECOLOGY

## 2023-05-01 PROCEDURE — 1160F PR REVIEW ALL MEDS BY PRESCRIBER/CLIN PHARMACIST DOCUMENTED: ICD-10-PCS | Mod: CPTII,S$GLB,, | Performed by: OBSTETRICS & GYNECOLOGY

## 2023-05-01 PROCEDURE — 3078F DIAST BP <80 MM HG: CPT | Mod: CPTII,S$GLB,, | Performed by: OBSTETRICS & GYNECOLOGY

## 2023-05-01 PROCEDURE — 3008F BODY MASS INDEX DOCD: CPT | Mod: CPTII,S$GLB,, | Performed by: OBSTETRICS & GYNECOLOGY

## 2023-05-01 PROCEDURE — 99999 PR PBB SHADOW E&M-EST. PATIENT-LVL III: CPT | Mod: PBBFAC,,, | Performed by: OBSTETRICS & GYNECOLOGY

## 2023-05-01 PROCEDURE — 1160F RVW MEDS BY RX/DR IN RCRD: CPT | Mod: CPTII,S$GLB,, | Performed by: OBSTETRICS & GYNECOLOGY

## 2023-05-01 RX ORDER — FLUOXETINE HYDROCHLORIDE 20 MG/1
20 CAPSULE ORAL DAILY
Qty: 30 CAPSULE | Refills: 2 | Status: SHIPPED | OUTPATIENT
Start: 2023-05-01 | End: 2023-07-24

## 2023-05-01 NOTE — PROGRESS NOTES
"Chief Complaint   Patient presents with    Results     Talk about Lab results       History of Present Illness   47 y.o. WF   patient presents today for prev hyst and RSO.  F/u anxiety, fatigue, wt gain.  Labs not , nl CBC and thyroid    Past medical and surgical history reviewed.   I have reviewed the patient's medical history in detail and updated the computerized patient record.    Review of patient's allergies indicates:  No Known Allergies      Review of Systems -   GEN ROS: positive for  - fatigue  Breast ROS: negative for breast lumps  Genito-Urinary ROS: negative      Physical Examination:  /78   Resp 14   Ht 5' 2" (1.575 m)   Wt 86.1 kg (189 lb 13.1 oz)   LMP 2019 (Exact Date)   BMI 34.72 kg/m²          Assessment:  Labs  1. Fatigue, unspecified type        2. Anxiety            Plan:  Prozac 20mg trial            "

## 2023-07-24 RX ORDER — FLUOXETINE HYDROCHLORIDE 20 MG/1
CAPSULE ORAL
Qty: 30 CAPSULE | Refills: 5 | Status: SHIPPED | OUTPATIENT
Start: 2023-07-24 | End: 2024-01-14

## 2024-01-14 RX ORDER — FLUOXETINE HYDROCHLORIDE 20 MG/1
CAPSULE ORAL
Qty: 90 CAPSULE | Refills: 1 | Status: SHIPPED | OUTPATIENT
Start: 2024-01-14

## 2024-01-14 NOTE — TELEPHONE ENCOUNTER
Refill Authorization Note     Refill Decision Note   Bibiana Go  is requesting a refill authorization.  Brief Assessment and Rationale for Refill:  Approve     Medication Therapy Plan:       Medication Reconciliation Completed: No   Comments:     No Care Gaps recommended.     Note composed:11:31 AM 01/14/2024

## 2024-06-14 ENCOUNTER — OFFICE VISIT (OUTPATIENT)
Dept: OBSTETRICS AND GYNECOLOGY | Facility: CLINIC | Age: 49
End: 2024-06-14
Payer: COMMERCIAL

## 2024-06-14 VITALS
BODY MASS INDEX: 37.74 KG/M2 | WEIGHT: 206.38 LBS | DIASTOLIC BLOOD PRESSURE: 110 MMHG | SYSTOLIC BLOOD PRESSURE: 162 MMHG

## 2024-06-14 DIAGNOSIS — Z01.419 ENCOUNTER FOR ANNUAL ROUTINE GYNECOLOGICAL EXAMINATION: Primary | ICD-10-CM

## 2024-06-14 PROCEDURE — 99396 PREV VISIT EST AGE 40-64: CPT | Mod: S$GLB,,, | Performed by: OBSTETRICS & GYNECOLOGY

## 2024-06-14 PROCEDURE — 99999 PR PBB SHADOW E&M-EST. PATIENT-LVL III: CPT | Mod: PBBFAC,,, | Performed by: OBSTETRICS & GYNECOLOGY

## 2024-06-14 PROCEDURE — 3080F DIAST BP >= 90 MM HG: CPT | Mod: CPTII,S$GLB,, | Performed by: OBSTETRICS & GYNECOLOGY

## 2024-06-14 PROCEDURE — 3077F SYST BP >= 140 MM HG: CPT | Mod: CPTII,S$GLB,, | Performed by: OBSTETRICS & GYNECOLOGY

## 2024-06-14 PROCEDURE — 1159F MED LIST DOCD IN RCRD: CPT | Mod: CPTII,S$GLB,, | Performed by: OBSTETRICS & GYNECOLOGY

## 2024-06-14 PROCEDURE — 3008F BODY MASS INDEX DOCD: CPT | Mod: CPTII,S$GLB,, | Performed by: OBSTETRICS & GYNECOLOGY

## 2024-06-14 PROCEDURE — 1160F RVW MEDS BY RX/DR IN RCRD: CPT | Mod: CPTII,S$GLB,, | Performed by: OBSTETRICS & GYNECOLOGY

## 2024-06-14 NOTE — PROGRESS NOTES
Chief Complaint   Patient presents with    Annual Exam       History and Physical:  Patient's last menstrual period was 2019 (exact date).       Bibiana Go is a 49 y.o. WF who presents today for her routine annual GYN exam. The patient has no Gynecology complaints today.       Allergies: Review of patient's allergies indicates:  No Known Allergies    Past Medical History:   Diagnosis Date    Acoustic neuroma     Asthma     as a child    History of PSVT (paroxysmal supraventricular tachycardia)     no longer needs medication       Past Surgical History:   Procedure Laterality Date     SECTION       SECTION WITH TUBAL LIGATION      CYSTOSCOPY N/A 2019    Procedure: CYSTOSCOPY;  Surgeon: Francisco Javier Bales MD;  Location: [facility] OR;  Service: OB/GYN;  Laterality: N/A;    HYSTERECTOMY  2019    OOPHORECTOMY  2019    one removed w/Hysterectomy    ROBOT-ASSISTED LAPAROSCOPIC HYSTERECTOMY N/A 2019    Procedure: ROBOTIC HYSTERECTOMY;  Surgeon: Francisco Javier Bales MD;  Location: [facility] OR;  Service: OB/GYN;  Laterality: N/A;    ROBOT-ASSISTED LAPAROSCOPIC OOPHORECTOMY Right 2019    Procedure: ROBOTIC OOPHORECTOMY;  Surgeon: Francisco Javier Bales MD;  Location: [facility] OR;  Service: OB/GYN;  Laterality: Right;    ROBOT-ASSISTED SALPINGECTOMY Bilateral 2019    Procedure: ROBOTIC SALPINGECTOMY;  Surgeon: Francisco Javier Bales MD;  Location: [facility] OR;  Service: OB/GYN;  Laterality: Bilateral;       MEDS:   Current Outpatient Medications on File Prior to Visit   Medication Sig Dispense Refill    ibuprofen (ADVIL,MOTRIN) 200 MG tablet Take 400 mg by mouth every 6 (six) hours as needed for Pain. PRN      multivitamin (THERAGRAN) tablet Take 1 tablet by mouth once daily.      [DISCONTINUED] FLUoxetine 20 MG capsule TAKE 1 CAPSULE BY MOUTH EVERY DAY (Patient not taking: Reported on 2024) 90 capsule 1     No current facility-administered medications on file prior to visit.       OB History           2    Para   2    Term   2            AB        Living             SAB        IAB        Ectopic        Multiple        Live Births                     Social History     Socioeconomic History    Marital status:     Number of children: 2   Occupational History    Occupation:  at [employer]   Tobacco Use    Smoking status: Never    Smokeless tobacco: Never   Substance and Sexual Activity    Alcohol use: Yes     Comment: occasionally    Drug use: Never    Sexual activity: Yes     Partners: Male     Social Determinants of Health     Financial Resource Strain: Low Risk  (2019)    Overall Financial Resource Strain (CARDIA)     Difficulty of Paying Living Expenses: Not hard at all   Food Insecurity: No Food Insecurity (2019)    Hunger Vital Sign     Worried About Running Out of Food in the Last Year: Never true     Ran Out of Food in the Last Year: Never true   Transportation Needs: No Transportation Needs (2019)    PRAPARE - Transportation     Lack of Transportation (Medical): No     Lack of Transportation (Non-Medical): No   Physical Activity: Unknown (2019)    Exercise Vital Sign     Days of Exercise per Week: 0 days   Stress: No Stress Concern Present (2019)    [employer] [place] [employer] - Occupational Stress Questionnaire     Feeling of Stress : Only a little       Family History   Problem Relation Name Age of Onset    Hyperlipidemia Mother      Diabetes Father      Heart disease Father      Breast cancer Maternal Aunt  45    Breast cancer Paternal Aunt  42         Past medical and surgical history reviewed.   I have reviewed the patient's medical history in detail and updated the computerized patient record.        Review of System:   General: no chills, fever, night sweats, weight gain or weight loss  Psychological: no depression or suicidal ideation  Breasts: no new or changing breast lumps, nipple discharge or masses.  Respiratory: no  cough, shortness of breath, or wheezing  Cardiovascular: no chest pain or dyspnea on exertion  Gastrointestinal: no abdominal pain, change in bowel habits, or black or bloody stools  Genito-Urinary: no incontinence, urinary frequency/urgency or vulvar/vaginal symptoms, pelvic pain or abnormal vaginal bleeding.  Musculoskeletal: no gait disturbance or muscular weakness      Physical Exam:   BP (!) 162/110   Wt 93.6 kg (206 lb 5.6 oz)   LMP 06/18/2019 (Exact Date)   BMI 37.74 kg/m²   Constitutional: She is oriented to person, place, and time. She appears well-developed and well-nourished. No distress.  HENT:   Head: Normocephalic and atraumatic.   Eyes: Conjunctivae and EOM are normal. No scleral icterus.   Neck: Normal range of motion. Neck supple. No tracheal deviation present.   Cardiovascular: Normal rate.    Pulmonary/Chest: Effort normal. No respiratory distress. She exhibits no tenderness.  Breasts: are symmetrical.no masses    Right breast exhibits no inverted nipple, no mass, no nipple discharge, no skin change and no tenderness.   Left breast exhibits no inverted nipple, no mass, no nipple discharge, no skin change and no tenderness.  Abdominal: Soft. She exhibits no distension and no mass. There is no tenderness. There is no rebound and no guarding.   Genitourinary:    External rectal exam shows no thrombosed external hemorrhoids.    Pelvic exam was performed with patient supine.   No labial fusion.   There is no rash, lesion or injury on the right labia.   There is no rash, lesion or injury on the left labia.   No bleeding and no signs of injury around the vaginal introitus, urethra is without lesions and well supported.    No vaginal discharge found.    No significant Cystocele, Enterocele or rectocele, and cuff well supported.   Bimanual exam:   The urethra and vagina are without palpable masses or tenderness.   Uterus and cervix are surgically absents, vaginal cuff is intact and well supported.    Right adnexum displays no mass and no tenderness.   Left adnexum displays no mass and no tenderness.  Musculoskeletal: Normal range of motion.   Lymphadenopathy: No inguinal adenopathy present.   Neurological: She is alert and oriented to person, place, and time. Coordination normal.   Skin: Skin is warm and dry. She is not diaphoretic.   Psychiatric: She has a normal mood and affect.        Assessment:   Normal annual GYN exam  1. Encounter for annual routine gynecological examination  Liquid-Based Pap Smear, Screening        R/o CHTN    Plan:   PAP    Mammogram stress compliance  See PCP for HTN eval  Follow up in 1 year.

## 2024-07-05 PROBLEM — D33.3 ACOUSTIC NEUROMA: Status: ACTIVE | Noted: 2024-07-05

## 2024-07-05 PROBLEM — N93.9 ABNORMAL UTERINE BLEEDING: Status: RESOLVED | Noted: 2019-07-18 | Resolved: 2024-07-05

## 2024-07-05 PROBLEM — E66.812 CLASS 2 OBESITY WITHOUT SERIOUS COMORBIDITY WITH BODY MASS INDEX (BMI) OF 37.0 TO 37.9 IN ADULT: Status: ACTIVE | Noted: 2024-07-05

## 2025-02-20 ENCOUNTER — OFFICE VISIT (OUTPATIENT)
Dept: OTOLARYNGOLOGY | Facility: CLINIC | Age: 50
End: 2025-02-20
Payer: COMMERCIAL

## 2025-02-20 ENCOUNTER — CLINICAL SUPPORT (OUTPATIENT)
Dept: AUDIOLOGY | Facility: CLINIC | Age: 50
End: 2025-02-20
Payer: COMMERCIAL

## 2025-02-20 VITALS — HEIGHT: 62 IN | BODY MASS INDEX: 37.9 KG/M2 | WEIGHT: 205.94 LBS

## 2025-02-20 DIAGNOSIS — D33.3 UNILATERAL VESTIBULAR SCHWANNOMA: Primary | ICD-10-CM

## 2025-02-20 DIAGNOSIS — H91.8X3 ASYMMETRICAL HEARING LOSS: Primary | ICD-10-CM

## 2025-02-20 DIAGNOSIS — H90.72 MIXED CONDUCTIVE AND SENSORINEURAL HEARING LOSS OF LEFT EAR WITH UNRESTRICTED HEARING OF RIGHT EAR: ICD-10-CM

## 2025-02-20 DIAGNOSIS — H91.92 PROFOUND HEARING LOSS OF LEFT EAR: ICD-10-CM

## 2025-02-20 DIAGNOSIS — H81.11 BENIGN PAROXYSMAL POSITIONAL VERTIGO, RIGHT: ICD-10-CM

## 2025-02-20 NOTE — PROGRESS NOTES
"Subjective     Patient ID: Bibiana Go is a 49 y.o. female.    Chief Complaint: Dizziness and Hearing Loss (Pt has acoustic neuroma)    HPI  Patient is new to ENT, last seen in ENT by Dr. Govind Steen 5.5 years ago for left vestibular schwannoma: "Vestibular schwannoma AS. Long talk. Disc obs, XRT, Microsurg. Considering age/ size would lean to MS. She would like to wait for now. Repeat MRI in 6 months. RTC then."   Most recent MRI done here was in March 2020, showed stable left enhancing vestibular schwannoma, no change in size.   Patient then decided to seek care through the [facility] group, of which Dr. Blair Almazan is a part.   Patient states she returns for f/u on vestibular schwannoma, and patient also states that she has been experiencing positional vertigo since Thursday, with associated nausea and emesis.     Review of Systems   Constitutional: Negative.    HENT:  Positive for hearing loss.    Eyes: Negative.    Respiratory: Negative.     Cardiovascular: Negative.    Gastrointestinal: Negative.    Musculoskeletal: Negative.    Integumentary:  Negative.   Neurological:  Positive for vertigo.   Hematological: Negative.    Psychiatric/Behavioral: Negative.            Objective     Physical Exam  Vitals and nursing note reviewed.   Constitutional:       General: She is not in acute distress.     Appearance: She is well-developed. She is not ill-appearing.   HENT:      Head: Normocephalic and atraumatic.      Right Ear: Hearing, tympanic membrane, ear canal and external ear normal. No middle ear effusion. Tympanic membrane is not erythematous.      Left Ear: Hearing, tympanic membrane, ear canal and external ear normal.  No middle ear effusion. Tympanic membrane is not erythematous.      Nose: Nose normal.   Eyes:      General: Lids are normal. No scleral icterus.        Right eye: No discharge.         Left eye: No discharge.   Neck:      Trachea: Trachea normal. No tracheal deviation.   Cardiovascular: "      Rate and Rhythm: Normal rate.   Pulmonary:      Effort: Pulmonary effort is normal. No respiratory distress.      Breath sounds: No stridor. No wheezing.   Musculoskeletal:         General: Normal range of motion.      Cervical back: Normal range of motion and neck supple.   Skin:     General: Skin is warm and dry.   Neurological:      Mental Status: She is alert and oriented to person, place, and time.      Coordination: Coordination normal.      Gait: Gait normal.   Psychiatric:         Attention and Perception: Attention normal.         Mood and Affect: Mood normal.         Speech: Speech normal.         Behavior: Behavior normal. Behavior is cooperative.     Positive [facility] [place]-[facility] HHR  Negative [facility] HHL         Assessment and Plan     1. Unilateral vestibular schwannoma  -     MRI IAC/Temporal Bones W W/O Contrast; Future; Expected date: 02/20/2025    2. Profound hearing loss of left ear    3. Benign paroxysmal positional vertigo, right        MRI brain with gadolinium per IAC protocol for left vestibular schwannoma, then see either Dr. Steen or Dr. Varela to discuss options.  Canalith repositioning maneuvers done by me to correct patient's BPPV Right   Post procedure instructions to prevent recurrence of BPPV were given in writing and reviewed in detail by me  Follow up as needed with ENT, audiologist, or PT to ensure full resolution.           No follow-ups on file.

## 2025-02-20 NOTE — PROGRESS NOTES
Bibiana Go was seen on 02/20/2025 for an audiological evaluation. Patient was alone during today's visit. Pertinent complaints today include hearing loss (AS) and dizziness. Patient has a history of Vestibular schwannoma (AS). Patient denies history of loud noise exposure and denies early onset of genetic family history of hearing loss. Patient reported a noticed decrease in hearing sensitivity since her last hearing evaluation. Previous audiologic evaluation on 6/28/2019 was consistent with a mild sensorineural hearing loss in the left ear. Otoscopy revealed minimal cerumen in both ears. The tympanic membrane was visualized AU prior to proceeding with the hearing testing.     Results reveal a profound mixed hearing loss for the left ear and normal hearing sensitivity in the right ear. Speech Reception Thresholds were 10 dBHL for the right ear and there was no response in the left ear.  Word recognition scores were excellent for the right ear and did not test for the left ear. Of note, there was a significant decrease in hearing sensitivity when compared to her previous audiogram (6/28/2019). Tympanograms were Type A for the right ear and Type A for the left ear.    The recommendations were as follows:  (1) Medical Evaluation   (2) Hearing aid consultation following medical clearance   (3) Ear protection in loud noise   (4) Annual hearing evaluation       Today's test results and recommendations were discussed with the patient.

## 2025-03-06 ENCOUNTER — OFFICE VISIT (OUTPATIENT)
Dept: OTOLARYNGOLOGY | Facility: CLINIC | Age: 50
End: 2025-03-06
Payer: COMMERCIAL

## 2025-03-06 VITALS — WEIGHT: 181.88 LBS | BODY MASS INDEX: 33.27 KG/M2

## 2025-03-06 DIAGNOSIS — D33.3 UNILATERAL VESTIBULAR SCHWANNOMA: Primary | ICD-10-CM

## 2025-03-06 PROCEDURE — 99999 PR PBB SHADOW E&M-EST. PATIENT-LVL II: CPT | Mod: PBBFAC,,, | Performed by: NURSE PRACTITIONER

## 2025-03-06 PROCEDURE — 1159F MED LIST DOCD IN RCRD: CPT | Mod: CPTII,S$GLB,, | Performed by: NURSE PRACTITIONER

## 2025-03-06 PROCEDURE — 3008F BODY MASS INDEX DOCD: CPT | Mod: CPTII,S$GLB,, | Performed by: NURSE PRACTITIONER

## 2025-03-06 PROCEDURE — 99213 OFFICE O/P EST LOW 20 MIN: CPT | Mod: S$GLB,,, | Performed by: NURSE PRACTITIONER

## 2025-03-06 NOTE — PROGRESS NOTES
"Subjective     Patient ID: Bibiana Go is a 49 y.o. female.    Chief Complaint: No chief complaint on file.    HPI  Patient of Dr. Govind Steen for left vestibular schwannoma: "Vestibular schwannoma AS. Long talk. Disc obs, XRT, Microsurg. Considering age/ size would lean to MS. She would like to wait for now. Repeat MRI in 6 months. RTC then."   Most recent MRI done here was in March 2020, showed stable left enhancing vestibular schwannoma, no change in size. Scheduled for MRI-IAC next week for size stability.   Patient treated here 2 weeks ago for right-sided BPPV -- returns today for recheck. States she has had no further vertigo since last visit here. She had a migraine for the first time last Tuesday (her mother and sister suffer with migraines), associated with nausea and vomiting.      Review of Systems   Constitutional: Negative.    HENT:  Positive for hearing loss.    Eyes: Negative.    Respiratory: Negative.     Cardiovascular: Negative.    Gastrointestinal: Negative.    Musculoskeletal: Negative.    Integumentary:  Negative.   Neurological:  Positive for vertigo.   Hematological: Negative.    Psychiatric/Behavioral: Negative.            Objective     Physical Exam  Vitals and nursing note reviewed.   Constitutional:       General: She is not in acute distress.     Appearance: She is well-developed. She is not ill-appearing.   HENT:      Head: Normocephalic and atraumatic.      Right Ear: Hearing, tympanic membrane, ear canal and external ear normal. No middle ear effusion. Tympanic membrane is not erythematous.      Left Ear: Hearing, tympanic membrane, ear canal and external ear normal.  No middle ear effusion. Tympanic membrane is not erythematous.      Nose: Nose normal.   Eyes:      General: Lids are normal. No scleral icterus.        Right eye: No discharge.         Left eye: No discharge.   Neck:      Trachea: Trachea normal. No tracheal deviation.   Cardiovascular:      Rate and Rhythm: " Normal rate.   Pulmonary:      Effort: Pulmonary effort is normal. No respiratory distress.      Breath sounds: No stridor. No wheezing.   Musculoskeletal:         General: Normal range of motion.      Cervical back: Normal range of motion and neck supple.   Skin:     General: Skin is warm and dry.   Neurological:      Mental Status: She is alert and oriented to person, place, and time.      Coordination: Coordination normal.      Gait: Gait normal.   Psychiatric:         Attention and Perception: Attention normal.         Mood and Affect: Mood normal.         Speech: Speech normal.         Behavior: Behavior normal. Behavior is cooperative.          Assessment and Plan     1. Unilateral vestibular schwannoma          MRI brain with gadolinium per IAC protocol for left vestibular schwannoma scheduled for next week, then see either Dr. Steen or Dr. Varela to discuss options.  Patient declined repeat [facility] testing today to confirm BPPV resolved.  Patient encouraged to return to clinic if symptoms worsen/persist and as needed for further ENT symptoms or concerns.              No follow-ups on file.

## 2025-03-26 ENCOUNTER — RESULTS FOLLOW-UP (OUTPATIENT)
Dept: OTOLARYNGOLOGY | Facility: CLINIC | Age: 50
End: 2025-03-26

## 2025-03-26 ENCOUNTER — HOSPITAL ENCOUNTER (OUTPATIENT)
Dept: RADIOLOGY | Facility: HOSPITAL | Age: 50
Discharge: HOME | End: 2025-03-26
Attending: NURSE PRACTITIONER
Payer: COMMERCIAL

## 2025-03-26 DIAGNOSIS — D33.3 UNILATERAL VESTIBULAR SCHWANNOMA: Primary | ICD-10-CM

## 2025-03-26 DIAGNOSIS — D33.3 UNILATERAL VESTIBULAR SCHWANNOMA: ICD-10-CM

## 2025-03-26 PROCEDURE — A9585 GADOBUTROL INJECTION: HCPCS | Mod: PO | Performed by: NURSE PRACTITIONER

## 2025-03-26 PROCEDURE — 25500020 PHARM REV CODE 255: Mod: PO | Performed by: NURSE PRACTITIONER

## 2025-03-26 PROCEDURE — 70553 MRI BRAIN STEM W/O & W/DYE: CPT | Mod: TC,PO

## 2025-03-26 PROCEDURE — 70553 MRI BRAIN STEM W/O & W/DYE: CPT | Mod: 26,,, | Performed by: RADIOLOGY

## 2025-03-26 RX ORDER — GADOBUTROL 604.72 MG/ML
8 INJECTION INTRAVENOUS
Status: COMPLETED | OUTPATIENT
Start: 2025-03-26 | End: 2025-03-26

## 2025-03-26 RX ADMIN — GADOBUTROL 8 ML: 604.72 INJECTION INTRAVENOUS at 10:03

## 2025-03-27 ENCOUNTER — TELEPHONE (OUTPATIENT)
Dept: OTOLARYNGOLOGY | Facility: CLINIC | Age: 50
End: 2025-03-27
Payer: COMMERCIAL

## 2025-03-31 ENCOUNTER — OFFICE VISIT (OUTPATIENT)
Dept: OTOLARYNGOLOGY | Facility: CLINIC | Age: 50
End: 2025-03-31
Payer: COMMERCIAL

## 2025-03-31 DIAGNOSIS — D33.3 UNILATERAL VESTIBULAR SCHWANNOMA: ICD-10-CM

## 2025-03-31 PROCEDURE — 99214 OFFICE O/P EST MOD 30 MIN: CPT | Mod: S$GLB,,, | Performed by: OTOLARYNGOLOGY

## 2025-03-31 PROCEDURE — 1159F MED LIST DOCD IN RCRD: CPT | Mod: CPTII,S$GLB,, | Performed by: OTOLARYNGOLOGY

## 2025-03-31 PROCEDURE — 3044F HG A1C LEVEL LT 7.0%: CPT | Mod: CPTII,S$GLB,, | Performed by: OTOLARYNGOLOGY

## 2025-03-31 PROCEDURE — 99999 PR PBB SHADOW E&M-EST. PATIENT-LVL II: CPT | Mod: PBBFAC,,, | Performed by: OTOLARYNGOLOGY

## 2025-03-31 NOTE — PROGRESS NOTES
Subjective:       Patient ID: Bibiana Go is a 49 y.o. female.    Chief Complaint: Follow-up    Returns for follow up after more recent MRI showed considerable growth in left IAC/CPA tumor, likely schwannoma. Last MRI was in 2020. Surgical resection was recommended at that time but patient elected for observation. Since last evaluation, patient has noted gradually worsening of left sided hearing, now deaf in that ear. In the last 2 months, she had noticed a decreased quality of life with episodes of vertigo, nausea, vomiting, headaches.           Review of Systems   HENT: Positive for hearing loss.  Negative for ear discharge, ear infection and ear pain.      Gastrointestinal:  Negative for vomiting.     Neurological: Positive for dizziness and headaches.     Hematologic: Negative for swollen glands.                Objective:      Physical Exam  Vitals reviewed.   Constitutional:       General: She is not in acute distress.     Appearance: Normal appearance. She is not ill-appearing.   HENT:      Head: Normocephalic and atraumatic.      Right Ear: Hearing, tympanic membrane, ear canal and external ear normal. No middle ear effusion. There is no impacted cerumen.      Left Ear: Tympanic membrane, ear canal and external ear normal. Decreased hearing noted.  No middle ear effusion. There is no impacted cerumen.      Nose: Nose normal.   Eyes:      Extraocular Movements: Extraocular movements intact.      Pupils: Pupils are equal, round, and reactive to light.   Pulmonary:      Effort: Pulmonary effort is normal.   Neurological:      General: No focal deficit present.      Mental Status: She is alert and oriented to person, place, and time.      Cranial Nerves: No cranial nerve deficit (HB-I bilaterally).           MRI IAC:  -Increased size of left CPA tumor with extension into IAC, now ~3cm  -compression on 4th ventricle  -cystic  -consistent with vestibular schwannoma    Assessment:       1. Unilateral vestibular  schwannoma        Plan:       Ipsilateral deafness on audiogram  Recommend surgical intervention  Urgent follow up with Dr. Varela to discuss surgery

## 2025-04-04 ENCOUNTER — OFFICE VISIT (OUTPATIENT)
Dept: OTOLARYNGOLOGY | Facility: CLINIC | Age: 50
End: 2025-04-04
Payer: COMMERCIAL

## 2025-04-04 DIAGNOSIS — H91.92 PROFOUND HEARING LOSS OF LEFT EAR: ICD-10-CM

## 2025-04-04 DIAGNOSIS — R42 VERTIGO: ICD-10-CM

## 2025-04-04 DIAGNOSIS — R51.9 FREQUENT HEADACHES: ICD-10-CM

## 2025-04-04 DIAGNOSIS — R20.2 FACIAL PARESTHESIA: ICD-10-CM

## 2025-04-04 DIAGNOSIS — D33.3 UNILATERAL VESTIBULAR SCHWANNOMA: Primary | ICD-10-CM

## 2025-04-04 PROCEDURE — 3044F HG A1C LEVEL LT 7.0%: CPT | Mod: CPTII,S$GLB,, | Performed by: OTOLARYNGOLOGY

## 2025-04-04 PROCEDURE — 99999 PR PBB SHADOW E&M-EST. PATIENT-LVL III: CPT | Mod: PBBFAC,,, | Performed by: OTOLARYNGOLOGY

## 2025-04-04 PROCEDURE — 99214 OFFICE O/P EST MOD 30 MIN: CPT | Mod: S$GLB,,, | Performed by: OTOLARYNGOLOGY

## 2025-04-04 PROCEDURE — 1159F MED LIST DOCD IN RCRD: CPT | Mod: CPTII,S$GLB,, | Performed by: OTOLARYNGOLOGY

## 2025-04-04 NOTE — LETTER
April 5, 2025        Govind Steen MD  [address]  [place] [address]             [facility]  [address]  [facility] [address]  Phone: [phone]  Fax: [phone]   Patient: Bibiana Go   MR Number: [id number]   YOB: 1975   Date of Visit: 4/4/2025       Dear Dr. Steen:    Thank you for referring Bibiana Go to me for evaluation. Below are the relevant portions of my assessment and plan of care.            If you have questions, please do not hesitate to call me. I look forward to following Bibiana along with you.    Sincerely,      Master, MD Dean           CC  No Recipients

## 2025-04-04 NOTE — PROGRESS NOTES
Otolaryngology - Head & Neck Surgery  Otology Clinic Note    Subjective     Chief Complaint:  Bibiana Go is a 49 y.o. female who presents to clinic for left sided vestibular schwannoma. The tumor was initially discovered in  due to asymmetric hearing loss in the left ear. She opted for observation and presented back recently due to increased symptoms. The tumor was found to be notably enlarged, so she was referred here for surgical evaluation. She reports progressively worsening hearing in the left ear as well as headaches, vertigo, and severe episodes of vomiting. She also reports occasional tingling of the face but denies any facial nerve weakness.    Review of Systems   Constitutional:  Negative for chills and fever.   HENT:  Negative for sore throat and trouble swallowing.    Eyes:  Negative for discharge and redness.   Respiratory:  Negative for cough and stridor.    Cardiovascular:  Negative for chest pain and palpitations.   Gastrointestinal:  Negative for nausea and vomiting.   Endocrine: Negative for polydipsia and polyphagia.   Genitourinary:  Negative for dysuria and hematuria.   Musculoskeletal:  Negative for arthralgias and myalgias.   Neurological:  Negative for seizures and headaches.   Hematological:  Negative for adenopathy. Does not bruise/bleed easily.   Psychiatric/Behavioral:  Negative for agitation and confusion.        Past Medical History: Patient has a past medical history of Acoustic neuroma, Asthma, and History of PSVT (paroxysmal supraventricular tachycardia).    Past Surgical History: Patient has a past surgical history that includes  section;  section with tubal ligation; Robot-assisted laparoscopic hysterectomy (N/A, 2019); Cystoscopy (N/A, 2019); Robot-assisted salpingectomy (Bilateral, 2019); Robot-assisted laparoscopic oophorectomy (Right, 2019); Hysterectomy (2019); Oophorectomy (2019); and Tubal ligation ().    Social  History: Patient reports that she has never smoked. She has never used smokeless tobacco. She reports current alcohol use of about 1.0 standard drink of alcohol per week. She reports that she does not use drugs.    Family History: family history includes Breast cancer in her maternal aunt and paternal aunt; Breast cancer (age of onset: 42) in her paternal aunt; Breast cancer (age of onset: 45) in her maternal aunt; Diabetes in her father; Heart disease in her father; Hyperlipidemia in her mother.    Medications:   Current Outpatient Medications   Medication Sig    amLODIPine (NORVASC) 5 MG tablet Take 1 tablet (5 mg total) by mouth once daily.    promethazine (PHENERGAN) 25 MG tablet Take 1 tablet (25 mg total) by mouth every 6 (six) hours as needed for Nausea.    sumatriptan (IMITREX) 50 MG tablet Take 1-2 tablets as needed for migraine. May repeat dose in 2 hours if no improvement.  Take no more than 4 tablets in 1 day     No current facility-administered medications for this visit.       Allergies: Patient has no known allergies.         Objective     Physical Exam  Constitutional:       General: She is not in acute distress.  HENT:      Head: Normocephalic and atraumatic.      Right Ear: External ear normal. Decreased hearing noted.      Left Ear: External ear normal. No decreased hearing noted.   Cardiovascular:      Rate and Rhythm: Normal rate and regular rhythm.   Pulmonary:      Effort: No respiratory distress.      Breath sounds: No stridor.   Abdominal:      General: Abdomen is flat. There is no distension.   Musculoskeletal:         General: No swelling or deformity.   Neurological:      General: No focal deficit present.      Mental Status: She is alert and oriented to person, place, and time.   Psychiatric:         Mood and Affect: Mood normal.         Behavior: Behavior normal.       Audiogram (2/20/25):    Audiogram reviewed and demonstrates the following:  Right ear - Normal hearing sensitivity,  Type A tymp, 100% WRS  Left ear - Profound hearing loss, Type A tymp, No response on WRS    MRI IAC (3/26/25):   1. Significantly increase in size of the avidly enhancing left cerebellopontine angle cistern/internal auditory canal lesion presumably representing a vestibular schwannoma, now measuring 31 x 39 x 27 mm, previously 10 x 21 x 9 mm.  2. Increased regional mass effect on the brainstem and cerebellum with compression of the 4th ventricle, with increased size of the lateral and 3rd ventricles and minimal associated periventricular edema, concerning for developing obstructive hydrocephalus.         Assessment and Plan     1. Unilateral vestibular schwannoma    2. Profound hearing loss of left ear        The patient is a 49 year old female who presents for evaluation of large left sided vestibular schwannoma. She is symptomatic with profound hearing loss, headaches/vomiting, and vertigo. We discussed her options including observation vs radiation vs surgery. Given the size of the tumor and mass effect on the brainstem and 4th ventricle, we recommend surgery. This would be through a trans-lab approach given her lack of serviceable hearing in the left ear. We extensively discussed the risks and benefits of surgery including what to expect post-op. All questions were answered.     Will plan for surgery in the coming weeks. Will have patient meet with Dr. Mccord prior to surgery.

## 2025-04-24 ENCOUNTER — PATIENT MESSAGE (OUTPATIENT)
Dept: NEUROSURGERY | Facility: CLINIC | Age: 50
End: 2025-04-24

## 2025-04-24 ENCOUNTER — OFFICE VISIT (OUTPATIENT)
Dept: NEUROSURGERY | Facility: CLINIC | Age: 50
End: 2025-04-24
Payer: COMMERCIAL

## 2025-04-24 DIAGNOSIS — D33.3 ACOUSTIC NEUROMA: Primary | ICD-10-CM

## 2025-04-24 NOTE — PROGRESS NOTES
CHIEF COMPLAINT:  Left vestibular schwannoma    HPI:  Bibiana Go is a 49 y.o.  female with below PMH, who is referred for evaluation of large left vestibular schwannoma.  Tumor was discovered in  after developing hearing loss.  It was small at the time and she chose to observe however the tumor has grown.  She had an episode of vertigo in February but was told it was originating from right side and it responded to Epley maneuver.    Her main complaint is cyclic vomiting up to 3 times a day.  She has difficulty eating and reports a chemical and salty taste that started in November.  She also reports headaches.   she is unable to function and carry out her daily activities.    She denies any facial numbness but notes that the left eye intermittently irritates her.  She has no obvious facial droop and no swallowing issues.    She is scheduled for a left-sided translabyrinthine approach for resection with Dr. Varela on May 8th.    Review of patient's allergies indicates:  No Known Allergies    Past Medical History:   Diagnosis Date    Acoustic neuroma     Asthma     as a child    History of PSVT (paroxysmal supraventricular tachycardia)     no longer needs medication     Past Surgical History:   Procedure Laterality Date     SECTION       SECTION WITH TUBAL LIGATION      CYSTOSCOPY N/A 2019    Procedure: CYSTOSCOPY;  Surgeon: Francisco Javier Bales MD;  Location: [facility] OR;  Service: OB/GYN;  Laterality: N/A;    HYSTERECTOMY  2019    OOPHORECTOMY  2019    one removed w/Hysterectomy    ROBOT-ASSISTED LAPAROSCOPIC HYSTERECTOMY N/A 2019    Procedure: ROBOTIC HYSTERECTOMY;  Surgeon: Francisco Javier Bales MD;  Location: [facility] OR;  Service: OB/GYN;  Laterality: N/A;    ROBOT-ASSISTED LAPAROSCOPIC OOPHORECTOMY Right 2019    Procedure: ROBOTIC OOPHORECTOMY;  Surgeon: Francisco Javier Bales MD;  Location: [facility] OR;  Service: OB/GYN;  Laterality: Right;    ROBOT-ASSISTED SALPINGECTOMY  Bilateral 07/18/2019    Procedure: ROBOTIC SALPINGECTOMY;  Surgeon: Francisco Javier Bales MD;  Location: [facility] OR;  Service: OB/GYN;  Laterality: Bilateral;    TUBAL LIGATION  2/09     Family History   Problem Relation Name Age of Onset    Hyperlipidemia Mother      Diabetes Father Dad     Heart disease Father Dad     Breast cancer Maternal Aunt  45    Breast cancer Paternal Aunt  42    Breast cancer Maternal Aunt Aunt Sheeba     Breast cancer Paternal Aunt Aunt Rosario      Social History[1]     Review of Systems   Constitutional: Negative.    HENT:  Positive for hearing loss. Negative for congestion, ear discharge, ear pain, nosebleeds, sinus pain and tinnitus.    Eyes: Negative.    Respiratory: Negative.     Cardiovascular:  Negative for chest pain, palpitations, claudication and leg swelling.   Gastrointestinal:  Positive for vomiting. Negative for abdominal pain, blood in stool, constipation, diarrhea and melena.   Genitourinary:  Negative for flank pain, frequency and urgency.   Musculoskeletal:  Negative for falls.   Skin: Negative.    Neurological:  Positive for headaches. Negative for dizziness, tingling, tremors, sensory change, speech change, focal weakness, seizures, loss of consciousness and weakness.   Endo/Heme/Allergies:  Does not bruise/bleed easily.   Psychiatric/Behavioral: Negative.         OBJECTIVE:   No obvious focal deficits  No obvious facial droop      Diagnostic Results:  All imaging was independently reviewed by me.    IAC brain, dated 3/26/25:  1. 3.9 x 3.1 cm left vestibular schwannoma  2. Mass effect on lateral medulla and cerebellum with distortion of 4th ventricle  3. No obvious hydrocephalus    ASSESSMENT/PLAN:     Problem List Items Addressed This Visit          ENT    Acoustic neuroma - Primary       Patient has a large left vestibular schwannoma causing mass effect on the lateral medulla and cerebellum with distortion of the 4th ventricle.  She presents with hearing loss, loss of taste,  headaches, and vomiting.  Due to the size and mass effect, it was recommended that she undergo surgical resection.  She is scheduled for trans labyrinthine approach with Dr. Varela on May 8.  She is in agreement with this plan.  I reviewed the imaging and the risks, benefits, and alternatives to surgery.  I emphasized the risk for facial weakness, brainstem injury, neurovascular injury, or other cranial nerve injury.    - Surgery scheduled for 5/8 at [facility]  - Preop clearance needed from Dr Greenfield  - STOP TAKING ASPIRIN, NSAIDS, AND ALL OTHER BLOOD THINNERS 7 DAYS PRIOR TO SURGERY    The patient understands and agrees with the plan of care. All questions were answered.        .               [1]   Social History  Tobacco Use    Smoking status: Never    Smokeless tobacco: Never   Substance Use Topics    Alcohol use: Yes     Alcohol/week: 1.0 standard drink of alcohol     Types: 1 Drinks containing 0.5 oz of alcohol per week     Comment: occasionally    Drug use: Never

## 2025-04-24 NOTE — H&P (VIEW-ONLY)
The patient location is: Home  The chief complaint leading to consultation is: Acoustic    Visit type: audiovisual    Face to Face time with patient: 17  29 minutes of total time spent on the encounter, which includes face to face time and non-face to face time preparing to see the patient (eg, review of tests), Obtaining and/or reviewing separately obtained history, Documenting clinical information in the electronic or other health record, Independently interpreting results (not separately reported) and communicating results to the patient/family/caregiver, or Care coordination (not separately reported).         Each patient to whom he or she provides medical services by telemedicine is:  (1) informed of the relationship between the physician and patient and the respective role of any other health care provider with respect to management of the patient; and (2) notified that he or she may decline to receive medical services by telemedicine and may withdraw from such care at any time.    Notes:  CHIEF COMPLAINT:  Left vestibular schwannoma    HPI:  Bibiana Go is a 49 y.o.  female with below PMH, who is referred for evaluation of large left vestibular schwannoma.  Tumor was discovered in 2019 after developing hearing loss.  It was small at the time and she chose to observe however the tumor has grown.  She had an episode of vertigo in February but was told it was originating from right side and it responded to Epley maneuver.    Her main complaint is cyclic vomiting up to 3 times a day.  She has difficulty eating and reports a chemical and salty taste that started in November.  She also reports headaches.  Sunday she is unable to function and carry out her daily activities.    She denies any facial numbness but notes that the left eye intermittently irritates her.  She has no obvious facial droop and no swallowing issues.    She is scheduled for a left-sided translabyrinthine approach for resection with   Master on May 8th.    Review of patient's allergies indicates:  No Known Allergies    Past Medical History:   Diagnosis Date    Acoustic neuroma     Asthma     as a child    History of PSVT (paroxysmal supraventricular tachycardia)     no longer needs medication     Past Surgical History:   Procedure Laterality Date     SECTION       SECTION WITH TUBAL LIGATION      CYSTOSCOPY N/A 2019    Procedure: CYSTOSCOPY;  Surgeon: Francisco Javier Bales MD;  Location: [facility] OR;  Service: OB/GYN;  Laterality: N/A;    HYSTERECTOMY  2019    OOPHORECTOMY  2019    one removed w/Hysterectomy    ROBOT-ASSISTED LAPAROSCOPIC HYSTERECTOMY N/A 2019    Procedure: ROBOTIC HYSTERECTOMY;  Surgeon: Francisco Javier Bales MD;  Location: [facility] OR;  Service: OB/GYN;  Laterality: N/A;    ROBOT-ASSISTED LAPAROSCOPIC OOPHORECTOMY Right 2019    Procedure: ROBOTIC OOPHORECTOMY;  Surgeon: Francisco Javier Bales MD;  Location: [facility] OR;  Service: OB/GYN;  Laterality: Right;    ROBOT-ASSISTED SALPINGECTOMY Bilateral 2019    Procedure: ROBOTIC SALPINGECTOMY;  Surgeon: Francisco Javier Bales MD;  Location: [facility] OR;  Service: OB/GYN;  Laterality: Bilateral;    TUBAL LIGATION       Family History   Problem Relation Name Age of Onset    Hyperlipidemia Mother      Diabetes Father Dad     Heart disease Father Dad     Breast cancer Maternal Aunt  45    Breast cancer Paternal Aunt  42    Breast cancer Maternal Aunt Aunt Sheeba     Breast cancer Paternal Aunt Aunt Rosario      Social History[1]     Review of Systems   Constitutional: Negative.    HENT:  Positive for hearing loss. Negative for congestion, ear discharge, ear pain, nosebleeds, sinus pain and tinnitus.    Eyes: Negative.    Respiratory: Negative.     Cardiovascular:  Negative for chest pain, palpitations, claudication and leg swelling.   Gastrointestinal:  Positive for vomiting. Negative for abdominal pain, blood in stool, constipation, diarrhea and melena.    Genitourinary:  Negative for flank pain, frequency and urgency.   Musculoskeletal:  Negative for falls.   Skin: Negative.    Neurological:  Positive for headaches. Negative for dizziness, tingling, tremors, sensory change, speech change, focal weakness, seizures, loss of consciousness and weakness.   Endo/Heme/Allergies:  Does not bruise/bleed easily.   Psychiatric/Behavioral: Negative.         OBJECTIVE:   No obvious focal deficits  No obvious facial droop      Diagnostic Results:  All imaging was independently reviewed by me.    IAC brain, dated 3/26/25:  1. 3.9 x 3.1 cm left vestibular schwannoma  2. Mass effect on lateral medulla and cerebellum with distortion of 4th ventricle  3. No obvious hydrocephalus    ASSESSMENT/PLAN:     Problem List Items Addressed This Visit          ENT    Acoustic neuroma - Primary       Patient has a large left vestibular schwannoma causing mass effect on the lateral medulla and cerebellum with distortion of the 4th ventricle.  She presents with hearing loss, loss of taste, headaches, and vomiting.  Due to the size and mass effect, it was recommended that she undergo surgical resection.  She is scheduled for trans labyrinthine approach with Dr. Varela on May 8.  She is in agreement with this plan.  I reviewed the imaging and the risks, benefits, and alternatives to surgery.  I emphasized the risk for facial weakness, brainstem injury, neurovascular injury, or other cranial nerve injury.    - Surgery scheduled for 5/8 at [facility] [place]  - Preop clearance needed from Dr Greenfield  - STOP TAKING ASPIRIN, NSAIDS, AND ALL OTHER BLOOD THINNERS 7 DAYS PRIOR TO SURGERY    The patient understands and agrees with the plan of care. All questions were answered.        .               [1]   Social History  Tobacco Use    Smoking status: Never    Smokeless tobacco: Never   Substance Use Topics    Alcohol use: Yes     Alcohol/week: 1.0 standard drink of alcohol     Types: 1 Drinks containing 0.5 oz of  alcohol per week     Comment: occasionally    Drug use: Never

## 2025-04-29 RX ORDER — PROMETHAZINE HYDROCHLORIDE 25 MG/1
25 TABLET ORAL EVERY 6 HOURS PRN
Qty: 40 TABLET | Refills: 0 | Status: SHIPPED | OUTPATIENT
Start: 2025-04-29

## 2025-04-29 RX ORDER — PROMETHAZINE HYDROCHLORIDE 25 MG/1
25 TABLET ORAL EVERY 6 HOURS PRN
Qty: 40 TABLET | Refills: 0 | Status: SHIPPED | OUTPATIENT
Start: 2025-04-29 | End: 2025-04-29 | Stop reason: SDUPTHER

## 2025-04-29 NOTE — ANESTHESIA PAT ROS NOTE
04/29/2025  Bibiana Go is a 49 y.o., female with UNILATERAL VESTIBULAR SCHWANNOMA, arrives for optimization and preop anesthesia assessment        4/24/2025 Surgeon's Note       CHIEF COMPLAINT:  Left vestibular schwannoma     HPI:  Bibiana Go is a 49 y.o.  female with below PMH, who is referred for evaluation of large left vestibular schwannoma.  Tumor was discovered in 2019 after developing hearing loss.  It was small at the time and she chose to observe however the tumor has grown.  She had an episode of vertigo in February but was told it was originating from right side and it responded to Epley maneuver.     Her main complaint is cyclic vomiting up to 3 times a day.  She has difficulty eating and reports a chemical and salty taste that started in November.  She also reports headaches.  Sunday she is unable to function and carry out her daily activities.     She denies any facial numbness but notes that the left eye intermittently irritates her.  She has no obvious facial droop and no swallowing issues.     She is scheduled for a left-sided translabyrinthine approach for resection with Dr. Varela on May 8th.     Review of patient's allergies indicates:  No Known Allergies          Past Medical History:   Diagnosis Date    Acoustic neuroma      Asthma       as a child    History of PSVT (paroxysmal supraventricular tachycardia)       no longer needs medication             Review of Systems   Constitutional: Negative.    HENT:  Positive for hearing loss. Negative for congestion, ear discharge, ear pain, nosebleeds, sinus pain and tinnitus.    Eyes: Negative.    Respiratory: Negative.     Cardiovascular:  Negative for chest pain, palpitations, claudication and leg swelling.   Gastrointestinal:  Positive for vomiting. Negative for abdominal pain, blood in stool, constipation, diarrhea and melena.    Genitourinary:  Negative for flank pain, frequency and urgency.   Musculoskeletal:  Negative for falls.   Skin: Negative.    Neurological:  Positive for headaches. Negative for dizziness, tingling, tremors, sensory change, speech change, focal weakness, seizures, loss of consciousness and weakness.   Endo/Heme/Allergies:  Does not bruise/bleed easily.   Psychiatric/Behavioral: Negative.           OBJECTIVE:   No obvious focal deficits  No obvious facial droop        Diagnostic Results:  All imaging was independently reviewed by me.     IAC brain, dated 3/26/25:  1. 3.9 x 3.1 cm left vestibular schwannoma  2. Mass effect on lateral medulla and cerebellum with distortion of 4th ventricle  3. No obvious hydrocephalus     ASSESSMENT/PLAN:      Problem List Items Addressed This Visit                  ENT     Acoustic neuroma - Primary         Patient has a large left vestibular schwannoma causing mass effect on the lateral medulla and cerebellum with distortion of the 4th ventricle.  She presents with hearing loss, loss of taste, headaches, and vomiting.  Due to the size and mass effect, it was recommended that she undergo surgical resection.  She is scheduled for trans labyrinthine approach with Dr. Varela on May 8.  She is in agreement with this plan.  I reviewed the imaging and the risks, benefits, and alternatives to surgery.  I emphasized the risk for facial weakness, brainstem injury, neurovascular injury, or other cranial nerve injury.     - Surgery scheduled for 5/8 at [facility] [place]  - Preop clearance needed from Dr Greenfield  - STOP TAKING ASPIRIN, NSAIDS, AND ALL OTHER BLOOD THINNERS 7 DAYS PRIOR TO SURGERY     The patient understands and agrees with the plan of care. All questions were answered.    Electronically signed by Farzad Mccord DO at 4/24/2025 10:28 AM       Pre-op Assessment    I have reviewed the Patient Summary Reports.     I have reviewed the Nursing Notes. I have reviewed the NPO Status.   I have  reviewed the Medications.     Review of Systems  Anesthesia Hx:    PONV           Denies Family Hx of Anesthesia complications.   Personal Hx of Anesthesia complications, Post-Operative Nausea/Vomiting                    Social:  Non-Smoker, No Alcohol Use  Socioeconomic History   Occupation   at [employer]  Marital Status    Number of Children  2    Patient Contacts (1/1)  Sb Go  Spouse  [phone]         Family History   Mother Hyperlipidemia  Father Diabetes   Heart disease  Maternal Aunt Breast cancer (45 y)  Maternal Aunt Breast cancer  Paternal Aunt Breast cancer (42 y)  Paternal Aunt Breast cancer          Hematology/Oncology:  Hematology Normal   Oncology Normal                                   EENT/Dental:  EENT/Dental Normal    Ears General/Symptom(s) Hearing Impairment:             Cardiovascular:  Exercise tolerance: poor    Denies Hypertension.       Denies Angina.       Denies ROBINS.                          Disorder of Cardiac Rhythm, Paroxysmal Supraventricular Tachycardia (PSVT)     Pulmonary:    Asthma asymptomatic  Recent URI, resolved  3/1/2025  FLU        3/18/2025  XR CHEST PA AND LATERAL     CLINICAL HISTORY:  Cough.     TECHNIQUE:  2 views of the chest     COMPARISON:  None available.     FINDINGS:  The lungs demonstrate no evidence of lobar type consolidation, visible pneumothorax, or pleural fluid.   The cardiac silhouette is within normal limits for size.   No acute displaced fracture is seen.     Impression:   1. No evidence lobar type consolidation or acute cardiac decompensation noted.                 Renal/:  Renal/ Normal                 Hepatic/GI:   Denies PUD.   Denies GERD. Denies Liver Disease.  Denies Hepatitis. NAUSEA R/T TUMOR  EMESIS everyday             Musculoskeletal:     Stiff neck            OB/GYN/PEDS:  07/18/2019  Robot-assisted laparoscopic hysterectomy (N/A)   07/18/2019  Cystoscopy (N/A)   07/18/2019  Robot-assisted salpingectomy  (Bilateral)   2019  Robot-assisted laparoscopic oophorectomy (Right)     Date Unknown   section  Date Unknown   section with tubal ligation    Tubal ligation             Neurological:  Denies TIA.  Denies CVA. Neuromuscular Disease,  Headaches Denies Seizures.     Neuro Symptoms of pain, headache, weakness, vertigo, dizziness Dx of Headaches        Brain Tumor, Acoustic Neuroma     MRI IAC/TEMPORAL BONES W W/O CONTRAST     CLINICAL HISTORY:  Hearing loss, sensorineural;known left vestibular schwannoma, check size for changes; Benign neoplasm of cranial nerves     TECHNIQUE:  Multiplanar multisequence MR imaging of the brain and IACs was performed before and after the administration of 8 mL Gadavist intravenous contrast.     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  The cerebellopontine angle cistern component demonstrates mixed T2 signal intensity and scattered foci of susceptibility suggesting microhemorrhages or small foci of mineralization with associated heterogeneous postcontrast enhancement.     Increased regional mass effect on the brainstem, left brachium pontis and cerebellar hemisphere with slight rightward shift.  No significant underlying parenchymal edema.  There is compression of the 4th ventricle with mild increased size of the lateral and 3rd ventricles as compared to the prior exam, with minimal periventricular T2/FLAIR hyperintense signal about the lateral ventricles suggesting periventricular edema and developing obstructive hydrocephalus.     The remainder of the brain appears within normal limits.  Diffusion-weighted images demonstrate no  evidence of acute infarct.  Susceptibility weighted images demonstrate no acute or chronic intracranial hemorrhage elsewhere.  No abnormal intracranial enhancement elsewhere.     No extra-axial blood or fluid collections.     Left membranous labyrinthine structures appear grossly within normal limits.  The right inner ear structures maintain normal morphology and signal without abnormal enhancement.  Right cranial nerve VII/VIII complex appears within normal limits.  No right-sided intracanalicular or cerebellopontine angle cistern mass.     The T2 skull base flow voids are preserved.  Bone marrow signal intensity unremarkable.     Paranasal sinuses and mastoid air cells are essentially clear.     Orbits are unremarkable.     Impression:     1. Significantly increase in size of the avidly enhancing left cerebellopontine angle cistern/internal auditory canal lesion presumably representing a vestibular schwannoma, now measuring 31 x 39 x 27 mm, previously 10 x 21 x 9 mm.  2. Increased regional mass effect on the brainstem and cerebellum with compression of the 4th ventricle, with increased size of the lateral and 3rd ventricles and minimal associated periventricular edema, concerning for developing obstructive hydrocephalus.  This report was flagged in Epic as abnormal.     The significant finding above was relayed by telephone by telephone to Germania Appiah NP on 03/26/2025 at 13:17.              Neuromuscular Disease   Endocrine:  Endocrine Normal            Dermatological:  Skin Normal    Psych:  Psychiatric Normal                    Physical Exam  General: Well nourished, Cooperative, Alert and Oriented    Airway:  Mallampati: II   Mouth Opening: Normal  Tongue: Normal  Neck ROM: Normal ROM    Dental:  Intact          Anesthesia Assessment: Preoperative EQUATION    Planned Procedure: CRANIOTOMY, TRANSLABYRINTHINE APPROACH, WITH SURGICAL REMOVAL OF ACOUSTIC NEUROMA CRANIOTOMY, TRANSLABYRINTHINE APPROACH WITH SURGICAL  REMOVAL OF ACOUSTIC NEUROMA  Requested Anesthesia Type:GENERAL  Surgeon: YU STOKES DO MASTER, ADAM, MD  Service: ENT  Known or anticipated Date of Surgery: 5/8/2025

## 2025-04-29 NOTE — DISCHARGE INSTRUCTIONS
Your surgery has been scheduled for:___5/8/2025___    You should report to:[facility], located on the [facility] side of the            Second floor of the [facility] ([phone])    Please Note   Tell your doctor if you take Aspirin, products containing Aspirin, herbal medications  or blood thinners, such as Coumadin, Ticlid, or Plavix.  (Consult your provider regarding holding or stopping before surgery).  Arrange for someone to drive you home following surgery.  You will not be allowed to leave the surgical facility alone or drive yourself home following sedation and anesthesia.    Before Surgery  Stop taking all herbal medications, vitamins, and supplements 7 days prior to surgery  No Motrin/Advil (Ibuprofen) 7 days before surgery  No Aleve (Naproxen) 7 days before surgery  Stop Taking Asprin, products containing Asprin __7___days before surgery  Stop taking blood thinners_______days before surgery  No Goody's/BC  Powder 7 days before surgery  Refrain from drinking alcoholic beverages for 24hours before and after surgery  Stop or limit smoking __7_______days before surgery  You may take Tylenol for pain    Night before Surgery  Do not eat or drink after midnight  Take a shower or bath (shower is recommended).  Bathe with Hibiclens soap or an antibacterial soap from the neck down.  If not supplied by your surgeon, hibiclens soap will need to be purchased over the counter in pharmacy.  Rinse soap off thoroughly.  Shampoo your hair with your regular shampoo    The Day of Surgery  Take another bath or shower with hibiclens or any antibacterial soap, to reduce the chance of infection.  Take heart and blood pressure medications with a small sip of water, as advised by the perioperative team.  Do not take fluid pills  You may brush your teeth and rinse your mouth, but do not swall any additional water.   Do not apply perfumes, powder, body lotions or deodorant on the day of  surgery.  Nail polish should be removed.  Do not wear makeup or moisturizer  Wear comfortable clothes, such as a button front shirt and loose fitting pants.  Leave all jewelry, including body piercings, and valuables at home.    Bring any devices you will neeed after surgery such as crutches or canes.  If you have sleep apnea, please bring your CPAP machine  In the event that your physical condition changes including the onset of a cold or respiratory illness, or if you have to delay or cancel your surgery, please notify your surgeon.     Anesthesia: General Anesthesia     You are watched continuously during your procedure by your anesthesia provider.     Youre due to have surgery. During surgery, youll be given medicine called anesthesia or anesthetic. This will keep you comfortable and pain-free. Your anesthesia provider will use general anesthesia.  What is general anesthesia?  General anesthesia puts you into a state like deep sleep. It goes into the bloodstream (IV anesthetics), into the lungs (gas anesthetics), or both. You feel nothing during the procedure. You will not remember it. During the procedure, the anesthesia provider monitors you continuously. He or she checks your heart rate and rhythm, blood pressure, breathing, and blood oxygen.  IV anesthetics. IV anesthetics are given through an IV line in your arm. Theyre often given first. This is so you are asleep before a gas anesthetic is started. Some kinds of IV anesthetics relieve pain. Others relax you. Your doctor will decide which kind is best in your case.  Gas anesthetics. Gas anesthetics are breathed into the lungs. They are often used to keep you asleep. They can be given through a facemask or a tube placed in your larynx or trachea (breathing tube).  If you have a facemask, your anesthesia provider will most likely place it over your nose and mouth while youre still awake. Youll breathe oxygen through the mask as your IV anesthetic is  started. Gas anesthetic may be added through the mask.  If you have a tube in the larynx or trachea, it will be inserted into your throat after youre asleep.  Anesthesia tools and medicines  You will likely have:  IV anesthetics. These are put into an IV line into your bloodstream.  Gas anesthetics. You breathe these anesthetics into your lungs, where they pass into your bloodstream.  Pulse oximeter. This is a small clip that is attached to the end of your finger. This measures your blood oxygen level.  Electrocardiography leads (electrodes). These are small sticky pads that are placed on your chest. They record your heart rate and rhythm.  Blood pressure cuff. This reads your blood pressure.  Risks and possible complications  General anesthesia has some risks. These include:  Breathing problems  Nausea and vomiting  Sore throat or hoarseness (usually temporary)  Allergic reaction to the anesthetic  Irregular heartbeat (rare)  Cardiac arrest (rare)   Anesthesia safety  Follow all instructions you are given for how long not to eat or drink before your procedure.  Be sure your doctor knows what medicines and drugs you take. This includes over-the-counter medicines, herbs, supplements, alcohol or other drugs. You will be asked when those were last taken.  Have an adult family member or friend drive you home after the procedure.  For the first 24 hours after your surgery:  Do not drive or use heavy equipment.  Do not make important decisions or sign legal documents. If important decisions or signing legal documents is necessary during the first 24 hours after surgery, have a trusted family member or spouse act on your behalf.  Avoid alcohol.  Have a responsible adult stay with you. He or she can watch for problems and help keep you safe.  Date Last Reviewed: 12/1/2016 © 2000-2017 [employer]. [address] [place] [address], [place] [address]. All rights reserved. This information is not intended as a substitute  for professional medical care. Always follow your healthcare professional's instructions.

## 2025-04-30 ENCOUNTER — HOSPITAL ENCOUNTER (OUTPATIENT)
Dept: PREADMISSION TESTING | Facility: HOSPITAL | Age: 50
Discharge: HOME | End: 2025-04-30
Attending: NEUROLOGICAL SURGERY
Payer: COMMERCIAL

## 2025-04-30 ENCOUNTER — OFFICE VISIT (OUTPATIENT)
Dept: INTERNAL MEDICINE | Facility: CLINIC | Age: 50
End: 2025-04-30
Payer: COMMERCIAL

## 2025-04-30 VITALS
WEIGHT: 165 LBS | BODY MASS INDEX: 29.23 KG/M2 | OXYGEN SATURATION: 98 % | RESPIRATION RATE: 16 BRPM | HEART RATE: 93 BPM | HEIGHT: 63 IN | TEMPERATURE: 99 F

## 2025-04-30 DIAGNOSIS — J30.2 SEASONAL ALLERGIES: ICD-10-CM

## 2025-04-30 DIAGNOSIS — I47.10 PSVT (PAROXYSMAL SUPRAVENTRICULAR TACHYCARDIA): ICD-10-CM

## 2025-04-30 DIAGNOSIS — R11.10 VOMITING, UNSPECIFIED VOMITING TYPE, UNSPECIFIED WHETHER NAUSEA PRESENT: ICD-10-CM

## 2025-04-30 DIAGNOSIS — D33.3 ACOUSTIC NEUROMA: Primary | ICD-10-CM

## 2025-04-30 DIAGNOSIS — Z90.710 H/O: HYSTERECTOMY: ICD-10-CM

## 2025-04-30 DIAGNOSIS — Z98.890 PONV (POSTOPERATIVE NAUSEA AND VOMITING): ICD-10-CM

## 2025-04-30 DIAGNOSIS — R06.83 SNORING: ICD-10-CM

## 2025-04-30 DIAGNOSIS — J45.909 CHILDHOOD ASTHMA, UNSPECIFIED ASTHMA SEVERITY, UNSPECIFIED WHETHER COMPLICATED, UNSPECIFIED WHETHER PERSISTENT: ICD-10-CM

## 2025-04-30 DIAGNOSIS — R11.2 PONV (POSTOPERATIVE NAUSEA AND VOMITING): ICD-10-CM

## 2025-04-30 PROCEDURE — 99999 PR PBB SHADOW E&M-EST. PATIENT-LVL III: CPT | Mod: PBBFAC,,, | Performed by: HOSPITALIST

## 2025-04-30 RX ORDER — OSELTAMIVIR PHOSPHATE 75 MG/1
75 CAPSULE ORAL EVERY 12 HOURS
COMMUNITY
Start: 2025-03-11

## 2025-04-30 RX ORDER — NAPROXEN SODIUM 220 MG
220 TABLET ORAL
COMMUNITY

## 2025-04-30 NOTE — ASSESSMENT & PLAN NOTE
We discussed sleep evaluation that she currently did not go for      Possible sleep apnea- I suggest a sleep study and suggest caution with usage of medication that can cause respiratory suppression in the perioperative period  potential ramifications of untreated sleep apnea, which could include daytime sleepiness, hypertension, heart disease and stroke were discussed  Suggested not to drive, if feels sleepy    Avoidance of  supine sleep, weight gain , alcoholic beverages , care with , sedative , CNS depressant use indicated  since all of these can worsen BERENICE      Suggested care with the pain medication that can be sedating and can increase sleep apnea

## 2025-04-30 NOTE — ASSESSMENT & PLAN NOTE
She lost about 30 lb from not able to eat well due to vomiting from the acoustic neuroma  I have discussed with her and her  that she is taking anti-inflammatory medication for headache which could also be causing vomiting and she chose to take Tylenol instead of naproxen    She is not on weight loss medication    Weight related conditions     Known to have       Snoring      Not troubled with / Not known to have     HTN  Type 2 Diabetes   Prediabetes   Gout   Hyperlipidemia   Acid reflux   Fatty liver   Osteoarthritis    Encouraged maintaining healthy weight for improved health

## 2025-04-30 NOTE — ASSESSMENT & PLAN NOTE
She had hysterectomy in 2019 and removal of 1 ovary for adenomyosis and heavy cycles from fibroids  She has no osteoporosis or osteopenia  She has no menopausal syndrome  No hormone replacement therapy

## 2025-04-30 NOTE — ASSESSMENT & PLAN NOTE
She had chest discomfort after the delivery of her 2nd child who is 16 years of age currently  She had cardiac evaluation twice-1 cardiologist's suggested ablation and the 2nd cardiologist's gave her reassurance that could be stress related which she was at that time  She was on medication for a short while which she has discontinued many years ago  She would not have any radiation of the discomfort are no sweating short of breath nausea vomiting diarrhea and she attributes this to stress-she had family and work-related stress  No Dissection  She has had no recurrence off the problem    I suggest monitoring her heart around the time of surgery although she has not had any problem in a long time and had went through hysterectomy with no problem  Suggested avoidance of stimulants

## 2025-04-30 NOTE — ASSESSMENT & PLAN NOTE
She had postop nausea vomiting with to afford C-sections although she did not have any postop nausea vomiting with hysterectomy    Off note she gets constipated with Zofran and does not want to take Zofran and she finds Phenergan to be helpful    Post operative nausea, vomiting - I suggest that the perioperative team be aware of this so that appropriate preventive care can be taken

## 2025-04-30 NOTE — OUTPATIENT SUBJECTIVE & OBJECTIVE
"Outpatient Subjective & Objective     Chief complaint-Preoperative evaluation, Perioperative Medical management, complication reduction plan     Active cardiac conditions- none    Revised cardiac risk index predictors- none    Functional capacity -Examples of physical activity  -she has been less active for about 3 months time and prior to that she was walking with a daughter in his walking her dog ,  can take 1 flight of stairs----- She can undertake all the above activities without  chest pain,chest tightness, Shortness of breath ,dizziness,lightheadedness making her exercise tolerance more,s  than 4 Mets.       Review of Systems   Constitutional:  Negative for chills and fever.   HENT:          STOPBANG score  / 8    Snoring     Witnessed stopping of breathing            Eyes:         No new visual changes   Respiratory:          No cough , phlegm    No Hemoptysis   Cardiovascular:         As noted   Gastrointestinal:         No overt GI/ blood losses  Bowel movements- Regular   Endocrine:        Prednisone use > 20 mg daily for 3 weeks- none   Genitourinary:  Negative for dysuria.        No urinary hesitancy    Musculoskeletal:           No unusual, muscle, joint pains   Skin:  Negative for rash.   Neurological:  Negative for syncope.        No unilateral weakness   Hematological:         Current use of Anticoagulants  None    Psychiatric/Behavioral:          No Depression         No past medical history pertinent negatives.        No   bleeding, cardiac problems with previous surgeries/procedures.  Medications and Allergies reviewed in epic    FH- No anesthesia,bleeding / venous thrombosis , in family  .     Physical Exam  Pulse 93, temperature 98.6 °F (37 °C), temperature source Oral, resp. rate 16, height 5' 3" (1.6 m), weight 74.8 kg (165 lb), last menstrual period 06/18/2019, SpO2 98%.    I offered a sheet  and the presence of a chaperone during physical examination   She was comfortable to proceed with " the exam without the the presence of a chaperone        Physical Exam  Constitutional- Vitals - Body mass index is 29.23 kg/m².,   Vitals:    04/30/25 0806   Pulse: 93   Resp: 16   Temp: 98.6 °F (37 °C)     General appearance-Conscious,Coherent  Eyes- No conjunctival icterus,pupils  round  and reactive to light   ENT-Oral cavity- moist    , Hearing grossly normal   Neck- No thyromegaly ,Trachea -central, No jugular venous distension,   No Carotid Bruit   Cardiovascular -Heart Sounds- Normal  and  no murmur   , No gallop rhythm   Respiratory - Normal Respiratory Effort, Normal breath sounds,  no wheeze , and  no forced expiratory wheeze    Peripheral pitting pedal edema-- none , no calf pain   Gastrointestinal -Soft abdomen, No palpable masses, Non Tender,Liver,Spleen not palpable. No-- free fluid and shifting dullness  Musculoskeletal- No finger Clubbing. Strength grossly normal   Lymphatic-No Palpable cervical, axillary,Inguinal lymphadenopathy   Psychiatric - normal effect,Orientation  Rt Dorsalis pedis pulses-palpable    Lt Dorsalis pedis pulses- palpable   Rt Posterior tibial pulses -palpable   Left posterior tibial pulses -palpable   Miscellaneous -  no renal bruit     Investigations  Lab and Imaging have been reviewed in epic.    Review of Medicine tests    We discussed EKG but given that she is doing well, we did not go with the  Review of clinical lab tests:  Lab Results   Component Value Date    CREATININE 0.59 03/10/2025    HGB 13.5 03/10/2025     03/10/2025         Review of old records- Was done and information gathered regards to events leading to surgery and health conditions of significance in the perioperative period.    Outpatient Subjective & Objective

## 2025-04-30 NOTE — ASSESSMENT & PLAN NOTE
She is planning on having surgery done for this on May 8th  She is having vomiting perhaps from pressure effects based on her description  No breathing or swallowing problems

## 2025-04-30 NOTE — PROGRESS NOTES
[facility]  Progress Note    Patient Name: Bibiana Go  MRN: [id number]  Date of Evaluation- 04/30/2025  PCP- Nirmala Ji MD    Future cases for Bibiana Go [[id number]]       Case ID Status Date Time Nik Procedure Provider Location    [id number] [facility] 5/8/2025  7:00  CRANIOTOMY, TRANSLABYRINTHINE APPROACH, WITH SURGICAL REMOVAL OF ACOUSTIC NEUROMA Farzad Mccord DO [[id number]] [facility] OR 2ND FLR            HPI:  History of present illness- I had the pleasure of meeting this pleasant 49 y.o. lady in the pre op clinic prior to her elective  Neuro  surgery. The patient is new to me .Ms Mccarty was accompanied by  Mr Castillo.    I have obtained the history by speaking to the patient and by reviewing the electronic health records.    Events leading up to surgery / History of presenting illness -    I have obtained the information by speaking to her and her caring  in the office  She started having left ear problems in 2019 when the fluid in [place] where in she had difficulty hearing through the left ear  She was given steroid treatment at that time did not help  She had MRI scan at that time that showed a left-sided vestibular schwannoma which was small in size    She was fine up until 2025 February when she started having vertigo and initially thought to be having high blood pressure and tried blood pressure medication for a few days after which she had stopped as it was felt that she does not have blood pressure problems    For the last 3 months she has been having generalized headache and vomiting  She has been taking naproxen/about 3 times a day for headache    She vomits water/food and has lost significant amount of weight of 30 lb in the last 3 months  There is  no blood or coffee like vomit  Physical activity increases the headache and resting helps    She has profound hearing loss on the left ear  I was able to hold a conversation with her and I was situated on the right  side of her which is her good ear  She is currently not wearing hearing aid and she understands that she may lose the hearing on the left ear after surgery    As per chart- left vestibular schwannoma    Relevant health conditions of significance for the perioperative period/ History of presenting illness -    Childhood asthma-grew out  PSVT  Vomiting   BMI-29.23  Seasonal allergies  Hysterectomy- 2019  Postop nausea vomiting      Lives with her  , son and daughter in a single-level house  Works as an  from home  Pets- 2 dogs  Children - 16 Y son in high school and a 20-year-old daughter in college  Pregnancies - 2  Miscarriages -None   C sections - 2   Has help post op-her mother lives close by and children and     Not known to have , Stroke/ Mini stroke ,Prediabetes , Diabetes Mellitus, Lung problem, Thyroid problem, Kidney problem, deep vein thrombosis, pulmonary embolism, acid reflux, sleep apnea, , fatty liver , blood vessels stent , tobacco smoking, edema, mental health problems , gout             Subjective/ Objective:     Chief complaint-Preoperative evaluation, Perioperative Medical management, complication reduction plan     Active cardiac conditions- none    Revised cardiac risk index predictors- none    Functional capacity -Examples of physical activity  -she has been less active for about 3 months time and prior to that she was walking with a daughter in his walking her dog ,  can take 1 flight of stairs----- She can undertake all the above activities without  chest pain,chest tightness, Shortness of breath ,dizziness,lightheadedness making her exercise tolerance more,s  than 4 Mets.       Review of Systems   Constitutional:  Negative for chills and fever.   HENT:          STOPBANG score  / 8    Snoring     Witnessed stopping of breathing            Eyes:         No new visual changes   Respiratory:          No cough , phlegm    No Hemoptysis   Cardiovascular:         As noted  "  Gastrointestinal:         No overt GI/ blood losses  Bowel movements- Regular   Endocrine:        Prednisone use > 20 mg daily for 3 weeks- none   Genitourinary:  Negative for dysuria.        No urinary hesitancy    Musculoskeletal:           No unusual, muscle, joint pains   Skin:  Negative for rash.   Neurological:  Negative for syncope.        No unilateral weakness   Hematological:         Current use of Anticoagulants  None    Psychiatric/Behavioral:          No Depression         No past medical history pertinent negatives.        No   bleeding, cardiac problems with previous surgeries/procedures.  Medications and Allergies reviewed in epic    FH- No anesthesia,bleeding / venous thrombosis , in family  .     Physical Exam  Pulse 93, temperature 98.6 °F (37 °C), temperature source Oral, resp. rate 16, height 5' 3" (1.6 m), weight 74.8 kg (165 lb), last menstrual period 06/18/2019, SpO2 98%.    I offered a sheet  and the presence of a chaperone during physical examination   She was comfortable to proceed with the exam without the the presence of a chaperone        Physical Exam  Constitutional- Vitals - Body mass index is 29.23 kg/m².,   Vitals:    04/30/25 0806   Pulse: 93   Resp: 16   Temp: 98.6 °F (37 °C)     General appearance-Conscious,Coherent  Eyes- No conjunctival icterus,pupils  round  and reactive to light   ENT-Oral cavity- moist    , Hearing grossly normal   Neck- No thyromegaly ,Trachea -central, No jugular venous distension,   No Carotid Bruit   Cardiovascular -Heart Sounds- Normal  and  no murmur   , No gallop rhythm   Respiratory - Normal Respiratory Effort, Normal breath sounds,  no wheeze , and  no forced expiratory wheeze    Peripheral pitting pedal edema-- none , no calf pain   Gastrointestinal -Soft abdomen, No palpable masses, Non Tender,Liver,Spleen not palpable. No-- free fluid and shifting dullness  Musculoskeletal- No finger Clubbing. Strength grossly normal   Lymphatic-No Palpable " cervical, axillary,Inguinal lymphadenopathy   Psychiatric - normal effect,Orientation  Rt Dorsalis pedis pulses-palpable    Lt Dorsalis pedis pulses- palpable   Rt Posterior tibial pulses -palpable   Left posterior tibial pulses -palpable   Miscellaneous -  no renal bruit     Investigations  Lab and Imaging have been reviewed in epic.    Review of Medicine tests    We discussed EKG but given that she is doing well, we did not go with the  Review of clinical lab tests:  Lab Results   Component Value Date    CREATININE 0.59 03/10/2025    HGB 13.5 03/10/2025     03/10/2025         Review of old records- Was done and information gathered regards to events leading to surgery and health conditions of significance in the perioperative period.        Preoperative cardiac risk assessment-  The patient does not have any active cardiac conditions . Revised cardiac risk index predictors- 0---.Functional capacity is more than 4 Mets. She will be undergoing a Neuro procedure that carries a Moderate Risk risk     Risk of a major Cardiac event ( Defined as death, myocardial infarction, or cardiac arrest at 30 days after noncardiac surgery), based on RCRI score     -3.9%         No further cardiac work up is indicated prior to proceeding with the surgery          American Society of Anesthesiologists Physical status classification ( ASA ) class: 3     Postoperative pulmonary complication risk assessment:      ARISCAT ( Canet) risk index- risk class -  Low    Assessment/Plan:     Acoustic neuroma  She is planning on having surgery done for this on May 8th  She is having vomiting perhaps from pressure effects based on her description  No breathing or swallowing problems    BMI 29.0-29.9,adult  She lost about 30 lb from not able to eat well due to vomiting from the acoustic neuroma  I have discussed with her and her  that she is taking anti-inflammatory medication for headache which could also be causing vomiting and she  chose to take Tylenol instead of naproxen    She is not on weight loss medication    Weight related conditions     Known to have       Snoring      Not troubled with / Not known to have     HTN  Type 2 Diabetes   Prediabetes   Gout   Hyperlipidemia   Acid reflux   Fatty liver   Osteoarthritis    Encouraged maintaining healthy weight for improved health     Childhood asthma  She grew out of her childhood asthma which she had from cats which she currently does not have at home  She had intubation for hysterectomy in 2019  She had not had any asthma problems with a hysterectomy    Asthma is controlled . I suggest consideration of inhaled bronchodilator use if the patient has perioperative bronchospasm       PSVT (paroxysmal supraventricular tachycardia)  She had chest discomfort after the delivery of her 2nd child who is 16 years of age currently  She had cardiac evaluation twice-1 cardiologist's suggested ablation and the 2nd cardiologist's gave her reassurance that could be stress related which she was at that time  She was on medication for a short while which she has discontinued many years ago  She would not have any radiation of the discomfort are no sweating short of breath nausea vomiting diarrhea and she attributes this to stress-she had family and work-related stress  No Dissection  She has had no recurrence off the problem    I suggest monitoring her heart around the time of surgery although she has not had any problem in a long time and had went through hysterectomy with no problem  Suggested avoidance of stimulants      Vomiting  She is having nonbloody vomiting  No melena stool  No ulcer problems  She has been taking naproxen for about 3 months which I suggested care about as it can cause ulcers and kidney problems  I suggested not to take naproxen 1 week before surgery    Discussed nutritious food intake to help with the healing process with her upcoming surgery    I suggested working on her muscle  strength and nutrition between now and surgery next week    Seasonal allergies  Doing good from an allergy standpoint and does not have any  eczema     H/O: hysterectomy  She had hysterectomy in 2019 and removal of 1 ovary for adenomyosis and heavy cycles from fibroids  She has no osteoporosis or osteopenia  She has no menopausal syndrome  No hormone replacement therapy    PONV (postoperative nausea and vomiting)  She had postop nausea vomiting with to afford C-sections although she did not have any postop nausea vomiting with hysterectomy    Off note she gets constipated with Zofran and does not want to take Zofran and she finds Phenergan to be helpful    Post operative nausea, vomiting - I suggest that the perioperative team be aware of this so that appropriate preventive care can be taken      Snoring  We discussed sleep evaluation that she currently did not go for      Possible sleep apnea- I suggest a sleep study and suggest caution with usage of medication that can cause respiratory suppression in the perioperative period  potential ramifications of untreated sleep apnea, which could include daytime sleepiness, hypertension, heart disease and stroke were discussed  Suggested not to drive, if feels sleepy    Avoidance of  supine sleep, weight gain , alcoholic beverages , care with , sedative , CNS depressant use indicated  since all of these can worsen BERENICE      Suggested care with the pain medication that can be sedating and can increase sleep apnea        Preventive perioperative care    Thromboembolic prophylaxis:  Her risk factors for thrombosis include surgical procedure.I suggest  thromboembolic prophylaxis ( mechanical/pharmacological, weighing the risk benefits of pharmacological agent use considering [facility] procedural bleeding )  during the perioperative period.        Postoperative pulmonary complication prophylaxis- I suggest incentive spirometry use, early ambulation, and end tidal carbon dioxide  monitoring  , oral care , head end of bed elevation      Renal complication prophylaxis- . I suggest keeping her well hydrated and avoidance/ minimizing the use of  NSAID's,ESPARZA 2 Inhibitors ,IV contrast if possible in the perioperative period     Surgical site Infection Prophylaxis-I  suggest appropriate antibiotic for Prophylaxis against Surgical site infections  No reported Staph infection  Skin antibacterial discussed           This visit was focused on Preoperative evaluation, Perioperative Medical management, complication reduction plans. I suggest that the patient follows up with primary care or relevant sub specialists for ongoing health care.    I appreciate the opportunity to be involved in this patients care. Please feel free to contact me if there were any questions about this consultation.    Patient is optimized    Patient/ care giver/ Family member was instructed to call and update me about any changes to health,  medication, office visits ,testing out side of the [facility] , hospitalizations between now and surgery      Jeri Greenfield MD  Internal Medicine  [facility]   Cell Phone- [phone]        COVID screening     No fever   No cough   No SOB  No sore throat       In summary this pleasant 49 year female is heading for a neurosurgery procedure and is doing well  We discussed nutrition and staying active to help with the her well-being and the recovery process      I have spent --62---- minutes of time which includes, time spent to prepare to see the patient , obtaining history ,performing examination, counseling/Educating the patient , Documenting clinical information in the record

## 2025-04-30 NOTE — ASSESSMENT & PLAN NOTE
She is having nonbloody vomiting  No melena stool  No ulcer problems  She has been taking naproxen for about 3 months which I suggested care about as it can cause ulcers and kidney problems  I suggested not to take naproxen 1 week before surgery    Discussed nutritious food intake to help with the healing process with her upcoming surgery    I suggested working on her muscle strength and nutrition between now and surgery next week

## 2025-04-30 NOTE — HPI
History of present illness- I had the pleasure of meeting this pleasant 49 y.o. lady in the pre op clinic prior to her elective  Neuro  surgery. The patient is new to me .Ms Mccarty was accompanied by  Mr Castillo.    I have obtained the history by speaking to the patient and by reviewing the electronic health records.    Events leading up to surgery / History of presenting illness -    I have obtained the information by speaking to her and her caring  in the office  She started having left ear problems in 2019 when the fluid in [place] where in she had difficulty hearing through the left ear  She was given steroid treatment at that time did not help  She had MRI scan at that time that showed a left-sided vestibular schwannoma which was small in size    She was fine up until 2025 February when she started having vertigo and initially thought to be having high blood pressure and tried blood pressure medication for a few days after which she had stopped as it was felt that she does not have blood pressure problems    For the last 3 months she has been having generalized headache and vomiting  She has been taking naproxen/about 3 times a day for headache    She vomits water/food and has lost significant amount of weight of 30 lb in the last 3 months  There is  no blood or coffee like vomit  Physical activity increases the headache and resting helps    She has profound hearing loss on the left ear  I was able to hold a conversation with her and I was situated on the right side of her which is her good ear  She is currently not wearing hearing aid and she understands that she may lose the hearing on the left ear after surgery    As per chart- left vestibular schwannoma    Relevant health conditions of significance for the perioperative period/ History of presenting illness -    Childhood asthma-grew out  PSVT  Vomiting   BMI-29.23  Seasonal allergies  Hysterectomy- 2019  Postop nausea vomiting      Lives with  her  , son and daughter in a single-level house  Works as an  from home  Pets- 2 dogs  Children - 16 Y son in high school and a 20-year-old daughter in college  Pregnancies - 2  Miscarriages -None   C sections - 2   Has help post op-her mother lives close by and children and     Not known to have , Stroke/ Mini stroke ,Prediabetes , Diabetes Mellitus, Lung problem, Thyroid problem, Kidney problem, deep vein thrombosis, pulmonary embolism, acid reflux, sleep apnea, , fatty liver , blood vessels stent , tobacco smoking, edema, mental health problems , gout

## 2025-04-30 NOTE — ASSESSMENT & PLAN NOTE
She grew out of her childhood asthma which she had from cats which she currently does not have at home  She had intubation for hysterectomy in 2019  She had not had any asthma problems with a hysterectomy    Asthma is controlled . I suggest consideration of inhaled bronchodilator use if the patient has perioperative bronchospasm

## 2025-05-07 ENCOUNTER — PATIENT MESSAGE (OUTPATIENT)
Dept: NEUROSURGERY | Facility: CLINIC | Age: 50
End: 2025-05-07
Payer: COMMERCIAL

## 2025-05-08 ENCOUNTER — HOSPITAL ENCOUNTER (INPATIENT)
Facility: HOSPITAL | Age: 50
LOS: 2 days | Discharge: HOME | DRG: 025 | End: 2025-05-10
Attending: NEUROLOGICAL SURGERY | Admitting: NEUROLOGICAL SURGERY
Payer: COMMERCIAL

## 2025-05-08 ENCOUNTER — ANESTHESIA (OUTPATIENT)
Dept: SURGERY | Facility: HOSPITAL | Age: 50
End: 2025-05-08
Payer: COMMERCIAL

## 2025-05-08 ENCOUNTER — ANESTHESIA EVENT (OUTPATIENT)
Dept: SURGERY | Facility: HOSPITAL | Age: 50
End: 2025-05-08
Payer: COMMERCIAL

## 2025-05-08 DIAGNOSIS — I61.9 ICH (INTRACEREBRAL HEMORRHAGE): ICD-10-CM

## 2025-05-08 DIAGNOSIS — D33.3 UNILATERAL VESTIBULAR SCHWANNOMA: ICD-10-CM

## 2025-05-08 DIAGNOSIS — R00.0 TACHYCARDIA: ICD-10-CM

## 2025-05-08 DIAGNOSIS — R13.10 DYSPHAGIA, UNSPECIFIED TYPE: Primary | ICD-10-CM

## 2025-05-08 DIAGNOSIS — D33.3 ACOUSTIC NEUROMA: ICD-10-CM

## 2025-05-08 LAB
ABSOLUTE EOSINOPHIL (OHS): 0.51 K/UL
ABSOLUTE MONOCYTE (OHS): 0.62 K/UL (ref 0.3–1)
ABSOLUTE NEUTROPHIL COUNT (OHS): 5.37 K/UL (ref 1.8–7.7)
ANION GAP (OHS): 12 MMOL/L (ref 8–16)
APTT PPP: 27.9 SECONDS (ref 21–32)
BACTERIA #/AREA URNS AUTO: ABNORMAL /HPF
BASOPHILS # BLD AUTO: 0.06 K/UL
BASOPHILS NFR BLD AUTO: 0.7 %
BILIRUB UR QL STRIP.AUTO: NEGATIVE
BUN SERPL-MCNC: 12 MG/DL (ref 6–20)
CALCIUM SERPL-MCNC: 9.4 MG/DL (ref 8.7–10.5)
CHLORIDE SERPL-SCNC: 105 MMOL/L (ref 95–110)
CLARITY UR: ABNORMAL
CO2 SERPL-SCNC: 22 MMOL/L (ref 23–29)
COLOR UR AUTO: YELLOW
CREAT SERPL-MCNC: 0.7 MG/DL (ref 0.5–1.4)
ERYTHROCYTE [DISTWIDTH] IN BLOOD BY AUTOMATED COUNT: 12.4 % (ref 11.5–14.5)
GFR SERPLBLD CREATININE-BSD FMLA CKD-EPI: >60 ML/MIN/1.73/M2
GLUCOSE SERPL-MCNC: 99 MG/DL (ref 70–110)
GLUCOSE UR QL STRIP: NEGATIVE
HCT VFR BLD AUTO: 40.6 % (ref 37–48.5)
HGB BLD-MCNC: 13.7 GM/DL (ref 12–16)
HGB UR QL STRIP: ABNORMAL
HOLD SPECIMEN: NORMAL
HYALINE CASTS UR QL AUTO: 0 /LPF (ref 0–1)
IMM GRANULOCYTES # BLD AUTO: 0.01 K/UL (ref 0–0.04)
IMM GRANULOCYTES NFR BLD AUTO: 0.1 % (ref 0–0.5)
INDIRECT COOMBS: NORMAL
INR PPP: 1 (ref 0.8–1.2)
KETONES UR QL STRIP: ABNORMAL
LEUKOCYTE ESTERASE UR QL STRIP: NEGATIVE
LYMPHOCYTES # BLD AUTO: 2.13 K/UL (ref 1–4.8)
MCH RBC QN AUTO: 30.9 PG (ref 27–31)
MCHC RBC AUTO-ENTMCNC: 33.7 G/DL (ref 32–36)
MCV RBC AUTO: 91 FL (ref 82–98)
MICROSCOPIC COMMENT: ABNORMAL
NITRITE UR QL STRIP: NEGATIVE
NUCLEATED RBC (/100WBC) (OHS): 0 /100 WBC
PH UR STRIP: 6 [PH]
PLATELET # BLD AUTO: 273 K/UL (ref 150–450)
PMV BLD AUTO: 10.5 FL (ref 9.2–12.9)
POTASSIUM SERPL-SCNC: 3.9 MMOL/L (ref 3.5–5.1)
PROT UR QL STRIP: ABNORMAL
PROTHROMBIN TIME: 11 SECONDS (ref 9–12.5)
RBC # BLD AUTO: 4.44 M/UL (ref 4–5.4)
RBC #/AREA URNS AUTO: 1 /HPF (ref 0–4)
RELATIVE EOSINOPHIL (OHS): 5.9 %
RELATIVE LYMPHOCYTE (OHS): 24.5 % (ref 18–48)
RELATIVE MONOCYTE (OHS): 7.1 % (ref 4–15)
RELATIVE NEUTROPHIL (OHS): 61.7 % (ref 38–73)
RH BLD: NORMAL
SODIUM SERPL-SCNC: 139 MMOL/L (ref 136–145)
SP GR UR STRIP: 1.02
SPECIMEN OUTDATE: NORMAL
SQUAMOUS #/AREA URNS AUTO: 21 /HPF
UROBILINOGEN UR STRIP-ACNC: NEGATIVE EU/DL
WBC # BLD AUTO: 8.7 K/UL (ref 3.9–12.7)
WBC #/AREA URNS AUTO: 5 /HPF (ref 0–5)

## 2025-05-08 PROCEDURE — 27800505 HC CATH, RADIAL ARTERY KIT: Performed by: ANESTHESIOLOGY

## 2025-05-08 PROCEDURE — P9016 RBC LEUKOCYTES REDUCED: HCPCS | Performed by: NEUROLOGICAL SURGERY

## 2025-05-08 PROCEDURE — 25000003 PHARM REV CODE 250: Performed by: NURSE ANESTHETIST, CERTIFIED REGISTERED

## 2025-05-08 PROCEDURE — 27100021 HC MULTIPORT INFUSION MANIFOLD: Performed by: ANESTHESIOLOGY

## 2025-05-08 PROCEDURE — 86920 COMPATIBILITY TEST SPIN: CPT | Performed by: NEUROLOGICAL SURGERY

## 2025-05-08 PROCEDURE — 20000000 HC ICU ROOM

## 2025-05-08 PROCEDURE — 85730 THROMBOPLASTIN TIME PARTIAL: CPT | Performed by: STUDENT IN AN ORGANIZED HEALTH CARE EDUCATION/TRAINING PROGRAM

## 2025-05-08 PROCEDURE — 61526 REMOVAL OF BRAIN LESION: CPT | Mod: 62,22,, | Performed by: NEUROLOGICAL SURGERY

## 2025-05-08 PROCEDURE — 63600175 PHARM REV CODE 636 W HCPCS: Performed by: NEUROLOGICAL SURGERY

## 2025-05-08 PROCEDURE — C1751 CATH, INF, PER/CENT/MIDLINE: HCPCS | Performed by: ANESTHESIOLOGY

## 2025-05-08 PROCEDURE — 25000003 PHARM REV CODE 250: Performed by: STUDENT IN AN ORGANIZED HEALTH CARE EDUCATION/TRAINING PROGRAM

## 2025-05-08 PROCEDURE — 88341 IMHCHEM/IMCYTCHM EA ADD ANTB: CPT | Mod: TC | Performed by: NEUROLOGICAL SURGERY

## 2025-05-08 PROCEDURE — 00BN0ZZ EXCISION OF ACOUSTIC NERVE, OPEN APPROACH: ICD-10-PCS | Performed by: NEUROLOGICAL SURGERY

## 2025-05-08 PROCEDURE — 85610 PROTHROMBIN TIME: CPT | Performed by: STUDENT IN AN ORGANIZED HEALTH CARE EDUCATION/TRAINING PROGRAM

## 2025-05-08 PROCEDURE — 0NU607Z SUPPLEMENT LEFT TEMPORAL BONE WITH AUTOLOGOUS TISSUE SUBSTITUTE, OPEN APPROACH: ICD-10-PCS | Performed by: NEUROLOGICAL SURGERY

## 2025-05-08 PROCEDURE — 85025 COMPLETE CBC W/AUTO DIFF WBC: CPT | Performed by: STUDENT IN AN ORGANIZED HEALTH CARE EDUCATION/TRAINING PROGRAM

## 2025-05-08 PROCEDURE — 37000009 HC ANESTHESIA EA ADD 15 MINS: Performed by: NEUROLOGICAL SURGERY

## 2025-05-08 PROCEDURE — 27201037 HC PRESSURE MONITORING SET UP

## 2025-05-08 PROCEDURE — 82310 ASSAY OF CALCIUM: CPT | Performed by: STUDENT IN AN ORGANIZED HEALTH CARE EDUCATION/TRAINING PROGRAM

## 2025-05-08 PROCEDURE — 81001 URINALYSIS AUTO W/SCOPE: CPT | Performed by: STUDENT IN AN ORGANIZED HEALTH CARE EDUCATION/TRAINING PROGRAM

## 2025-05-08 PROCEDURE — 0JB83ZZ EXCISION OF ABDOMEN SUBCUTANEOUS TISSUE AND FASCIA, PERCUTANEOUS APPROACH: ICD-10-PCS | Performed by: NEUROLOGICAL SURGERY

## 2025-05-08 PROCEDURE — 36000710: Performed by: NEUROLOGICAL SURGERY

## 2025-05-08 PROCEDURE — 63600175 PHARM REV CODE 636 W HCPCS: Performed by: NURSE ANESTHETIST, CERTIFIED REGISTERED

## 2025-05-08 PROCEDURE — 0NB60ZZ EXCISION OF LEFT TEMPORAL BONE, OPEN APPROACH: ICD-10-PCS | Performed by: NEUROLOGICAL SURGERY

## 2025-05-08 PROCEDURE — 27200677 HC TRANSDUCER MONITOR KIT SINGLE: Performed by: ANESTHESIOLOGY

## 2025-05-08 PROCEDURE — 27201423 OPTIME MED/SURG SUP & DEVICES STERILE SUPPLY: Performed by: NEUROLOGICAL SURGERY

## 2025-05-08 PROCEDURE — 15769 GRFG AUTOL SOFT TISS DIR EXC: CPT | Mod: 51,,, | Performed by: OTOLARYNGOLOGY

## 2025-05-08 PROCEDURE — 0NU60JZ SUPPLEMENT LEFT TEMPORAL BONE WITH SYNTHETIC SUBSTITUTE, OPEN APPROACH: ICD-10-PCS | Performed by: NEUROLOGICAL SURGERY

## 2025-05-08 PROCEDURE — 37000008 HC ANESTHESIA 1ST 15 MINUTES: Performed by: NEUROLOGICAL SURGERY

## 2025-05-08 PROCEDURE — C1713 ANCHOR/SCREW BN/BN,TIS/BN: HCPCS | Performed by: NEUROLOGICAL SURGERY

## 2025-05-08 PROCEDURE — 36000711: Performed by: NEUROLOGICAL SURGERY

## 2025-05-08 PROCEDURE — 61526 REMOVAL OF BRAIN LESION: CPT | Mod: 22,62,, | Performed by: OTOLARYNGOLOGY

## 2025-05-08 PROCEDURE — 86901 BLOOD TYPING SEROLOGIC RH(D): CPT | Performed by: STUDENT IN AN ORGANIZED HEALTH CARE EDUCATION/TRAINING PROGRAM

## 2025-05-08 DEVICE — 10CC HYDROSET INJECTABLE CEMENT
Type: IMPLANTABLE DEVICE | Site: CRANIAL | Status: FUNCTIONAL
Brand: HYDROSET

## 2025-05-08 RX ORDER — DEXAMETHASONE SODIUM PHOSPHATE 4 MG/ML
INJECTION, SOLUTION INTRA-ARTICULAR; INTRALESIONAL; INTRAMUSCULAR; INTRAVENOUS; SOFT TISSUE
Status: DISCONTINUED | OUTPATIENT
Start: 2025-05-08 | End: 2025-05-09

## 2025-05-08 RX ORDER — HYDRALAZINE HYDROCHLORIDE 20 MG/ML
10 INJECTION INTRAMUSCULAR; INTRAVENOUS EVERY 4 HOURS PRN
Status: DISCONTINUED | OUTPATIENT
Start: 2025-05-09 | End: 2025-05-08

## 2025-05-08 RX ORDER — POLYETHYLENE GLYCOL 3350 17 G/17G
17 POWDER, FOR SOLUTION ORAL DAILY
Status: DISCONTINUED | OUTPATIENT
Start: 2025-05-09 | End: 2025-05-09

## 2025-05-08 RX ORDER — MIDAZOLAM HYDROCHLORIDE 1 MG/ML
INJECTION INTRAMUSCULAR; INTRAVENOUS
Status: DISCONTINUED | OUTPATIENT
Start: 2025-05-08 | End: 2025-05-09

## 2025-05-08 RX ORDER — ACETAMINOPHEN 325 MG/1
650 TABLET ORAL EVERY 6 HOURS PRN
Status: DISCONTINUED | OUTPATIENT
Start: 2025-05-09 | End: 2025-05-09

## 2025-05-08 RX ORDER — KETAMINE HYDROCHLORIDE 10 MG/ML
INJECTION, SOLUTION INTRAMUSCULAR; INTRAVENOUS
Status: DISCONTINUED | OUTPATIENT
Start: 2025-05-08 | End: 2025-05-09

## 2025-05-08 RX ORDER — LANOLIN ALCOHOL/MO/W.PET/CERES
800 CREAM (GRAM) TOPICAL
Status: DISCONTINUED | OUTPATIENT
Start: 2025-05-09 | End: 2025-05-09

## 2025-05-08 RX ORDER — SODIUM,POTASSIUM PHOSPHATES 280-250MG
2 POWDER IN PACKET (EA) ORAL
Status: DISCONTINUED | OUTPATIENT
Start: 2025-05-09 | End: 2025-05-09

## 2025-05-08 RX ORDER — MUPIROCIN 20 MG/G
OINTMENT TOPICAL
Status: DISCONTINUED | OUTPATIENT
Start: 2025-05-08 | End: 2025-05-09 | Stop reason: HOSPADM

## 2025-05-08 RX ORDER — CEFAZOLIN SODIUM 1 G/3ML
INJECTION, POWDER, FOR SOLUTION INTRAMUSCULAR; INTRAVENOUS
Status: DISCONTINUED | OUTPATIENT
Start: 2025-05-08 | End: 2025-05-09

## 2025-05-08 RX ORDER — LABETALOL HCL 20 MG/4 ML
10 SYRINGE (ML) INTRAVENOUS EVERY 4 HOURS PRN
Status: DISCONTINUED | OUTPATIENT
Start: 2025-05-09 | End: 2025-05-10

## 2025-05-08 RX ORDER — PHENYLEPHRINE HYDROCHLORIDE 10 MG/ML
INJECTION INTRAVENOUS
Status: DISCONTINUED | OUTPATIENT
Start: 2025-05-08 | End: 2025-05-09

## 2025-05-08 RX ORDER — PROPOFOL 10 MG/ML
VIAL (ML) INTRAVENOUS
Status: DISCONTINUED | OUTPATIENT
Start: 2025-05-08 | End: 2025-05-09

## 2025-05-08 RX ORDER — PROCHLORPERAZINE EDISYLATE 5 MG/ML
5 INJECTION INTRAMUSCULAR; INTRAVENOUS EVERY 6 HOURS PRN
Status: DISCONTINUED | OUTPATIENT
Start: 2025-05-09 | End: 2025-05-10

## 2025-05-08 RX ORDER — MORPHINE SULFATE 2 MG/ML
2 INJECTION, SOLUTION INTRAMUSCULAR; INTRAVENOUS EVERY 4 HOURS PRN
Status: DISCONTINUED | OUTPATIENT
Start: 2025-05-09 | End: 2025-05-09

## 2025-05-08 RX ORDER — ROCURONIUM BROMIDE 10 MG/ML
INJECTION, SOLUTION INTRAVENOUS
Status: DISCONTINUED | OUTPATIENT
Start: 2025-05-08 | End: 2025-05-09

## 2025-05-08 RX ORDER — LABETALOL HCL 20 MG/4 ML
10 SYRINGE (ML) INTRAVENOUS EVERY 4 HOURS PRN
Status: DISCONTINUED | OUTPATIENT
Start: 2025-05-09 | End: 2025-05-08

## 2025-05-08 RX ORDER — AMOXICILLIN 250 MG
1 CAPSULE ORAL 2 TIMES DAILY
Status: DISCONTINUED | OUTPATIENT
Start: 2025-05-08 | End: 2025-05-09

## 2025-05-08 RX ORDER — FENTANYL CITRATE 50 UG/ML
INJECTION, SOLUTION INTRAMUSCULAR; INTRAVENOUS
Status: DISCONTINUED | OUTPATIENT
Start: 2025-05-08 | End: 2025-05-09

## 2025-05-08 RX ORDER — PROPOFOL 10 MG/ML
VIAL (ML) INTRAVENOUS CONTINUOUS PRN
Status: DISCONTINUED | OUTPATIENT
Start: 2025-05-08 | End: 2025-05-09

## 2025-05-08 RX ORDER — HYDRALAZINE HYDROCHLORIDE 20 MG/ML
10 INJECTION INTRAMUSCULAR; INTRAVENOUS EVERY 4 HOURS PRN
Status: DISCONTINUED | OUTPATIENT
Start: 2025-05-09 | End: 2025-05-10

## 2025-05-08 RX ORDER — MUPIROCIN 20 MG/G
1 OINTMENT TOPICAL 2 TIMES DAILY
Status: DISCONTINUED | OUTPATIENT
Start: 2025-05-08 | End: 2025-05-09 | Stop reason: HOSPADM

## 2025-05-08 RX ORDER — SUCCINYLCHOLINE CHLORIDE 20 MG/ML
INJECTION INTRAMUSCULAR; INTRAVENOUS
Status: DISCONTINUED | OUTPATIENT
Start: 2025-05-08 | End: 2025-05-09

## 2025-05-08 RX ORDER — LIDOCAINE HYDROCHLORIDE 20 MG/ML
INJECTION INTRAVENOUS
Status: DISCONTINUED | OUTPATIENT
Start: 2025-05-08 | End: 2025-05-09

## 2025-05-08 RX ORDER — OXYCODONE HYDROCHLORIDE 5 MG/1
10 TABLET ORAL EVERY 4 HOURS PRN
Refills: 0 | Status: DISCONTINUED | OUTPATIENT
Start: 2025-05-09 | End: 2025-05-09

## 2025-05-08 RX ORDER — SODIUM CHLORIDE 0.9 % (FLUSH) 0.9 %
10 SYRINGE (ML) INJECTION
Status: DISCONTINUED | OUTPATIENT
Start: 2025-05-09 | End: 2025-05-10 | Stop reason: HOSPADM

## 2025-05-08 RX ORDER — LIDOCAINE HYDROCHLORIDE AND EPINEPHRINE 10; 10 UG/ML; MG/ML
INJECTION, SOLUTION INFILTRATION; PERINEURAL
Status: DISCONTINUED | OUTPATIENT
Start: 2025-05-08 | End: 2025-05-09 | Stop reason: HOSPADM

## 2025-05-08 RX ORDER — OXYCODONE HYDROCHLORIDE 5 MG/1
5 TABLET ORAL EVERY 4 HOURS PRN
Refills: 0 | Status: DISCONTINUED | OUTPATIENT
Start: 2025-05-09 | End: 2025-05-09

## 2025-05-08 RX ADMIN — PHENYLEPHRINE HYDROCHLORIDE 100 MCG: 10 INJECTION INTRAVENOUS at 08:05

## 2025-05-08 RX ADMIN — SODIUM CHLORIDE, SODIUM GLUCONATE, SODIUM ACETATE, POTASSIUM CHLORIDE, MAGNESIUM CHLORIDE, SODIUM PHOSPHATE, DIBASIC, AND POTASSIUM PHOSPHATE: .53; .5; .37; .037; .03; .012; .00082 INJECTION, SOLUTION INTRAVENOUS at 05:05

## 2025-05-08 RX ADMIN — PROPOFOL 200 MG: 10 INJECTION, EMULSION INTRAVENOUS at 07:05

## 2025-05-08 RX ADMIN — FENTANYL CITRATE 50 MCG: 50 INJECTION, SOLUTION INTRAMUSCULAR; INTRAVENOUS at 07:05

## 2025-05-08 RX ADMIN — CEFAZOLIN 2 G: 330 INJECTION, POWDER, FOR SOLUTION INTRAMUSCULAR; INTRAVENOUS at 12:05

## 2025-05-08 RX ADMIN — SUCCINYLCHOLINE 140 MG: 20 INJECTION, SOLUTION INTRAMUSCULAR; INTRAVENOUS at 07:05

## 2025-05-08 RX ADMIN — FENTANYL CITRATE 25 MCG: 50 INJECTION, SOLUTION INTRAMUSCULAR; INTRAVENOUS at 07:05

## 2025-05-08 RX ADMIN — SODIUM CHLORIDE: 0.9 INJECTION, SOLUTION INTRAVENOUS at 07:05

## 2025-05-08 RX ADMIN — DEXAMETHASONE SODIUM PHOSPHATE 8 MG: 4 INJECTION, SOLUTION INTRAMUSCULAR; INTRAVENOUS at 08:05

## 2025-05-08 RX ADMIN — PHENYLEPHRINE HYDROCHLORIDE 100 MCG: 10 INJECTION INTRAVENOUS at 07:05

## 2025-05-08 RX ADMIN — LIDOCAINE HYDROCHLORIDE 80 MG: 20 INJECTION INTRAVENOUS at 07:05

## 2025-05-08 RX ADMIN — SODIUM CHLORIDE, SODIUM GLUCONATE, SODIUM ACETATE, POTASSIUM CHLORIDE, MAGNESIUM CHLORIDE, SODIUM PHOSPHATE, DIBASIC, AND POTASSIUM PHOSPHATE: .53; .5; .37; .037; .03; .012; .00082 INJECTION, SOLUTION INTRAVENOUS at 10:05

## 2025-05-08 RX ADMIN — CEFAZOLIN 2 G: 330 INJECTION, POWDER, FOR SOLUTION INTRAMUSCULAR; INTRAVENOUS at 04:05

## 2025-05-08 RX ADMIN — SODIUM CHLORIDE, SODIUM GLUCONATE, SODIUM ACETATE, POTASSIUM CHLORIDE, MAGNESIUM CHLORIDE, SODIUM PHOSPHATE, DIBASIC, AND POTASSIUM PHOSPHATE: .53; .5; .37; .037; .03; .012; .00082 INJECTION, SOLUTION INTRAVENOUS at 07:05

## 2025-05-08 RX ADMIN — CEFAZOLIN 2 G: 330 INJECTION, POWDER, FOR SOLUTION INTRAMUSCULAR; INTRAVENOUS at 08:05

## 2025-05-08 RX ADMIN — KETAMINE HYDROCHLORIDE 10 MG: 10 INJECTION INTRAMUSCULAR; INTRAVENOUS at 09:05

## 2025-05-08 RX ADMIN — ROCURONIUM BROMIDE 5 MG: 10 INJECTION, SOLUTION INTRAVENOUS at 07:05

## 2025-05-08 RX ADMIN — PHENYLEPHRINE HYDROCHLORIDE 0.4 MCG/KG/MIN: 10 INJECTION INTRAVENOUS at 08:05

## 2025-05-08 RX ADMIN — SODIUM CHLORIDE 0.2 MCG/KG/MIN: 9 INJECTION, SOLUTION INTRAVENOUS at 07:05

## 2025-05-08 RX ADMIN — KETAMINE HYDROCHLORIDE 10 MG: 10 INJECTION INTRAMUSCULAR; INTRAVENOUS at 11:05

## 2025-05-08 RX ADMIN — KETAMINE HYDROCHLORIDE 30 MG: 10 INJECTION INTRAMUSCULAR; INTRAVENOUS at 07:05

## 2025-05-08 RX ADMIN — PROPOFOL 50 MG: 10 INJECTION, EMULSION INTRAVENOUS at 07:05

## 2025-05-08 RX ADMIN — PROPOFOL 200 MCG/KG/MIN: 10 INJECTION, EMULSION INTRAVENOUS at 07:05

## 2025-05-08 RX ADMIN — MUPIROCIN: 20 OINTMENT TOPICAL at 05:05

## 2025-05-08 RX ADMIN — MIDAZOLAM HYDROCHLORIDE 2 MG: 1 INJECTION, SOLUTION INTRAMUSCULAR; INTRAVENOUS at 07:05

## 2025-05-08 NOTE — ANESTHESIA PREPROCEDURE EVALUATION
05/08/2025  Bibiana Go is a 49 y.o., female.    Anesthesia Evaluation    I have reviewed the Patient Summary Reports.       I have reviewed the Medications.     Review of Systems  Anesthesia Hx:              Personal Hx of Anesthesia complications, Post-Operative Nausea/Vomiting                    Social:  Non-Smoker       Cardiovascular:                      H/o PSVT; asymptomatic                           Neurological:           Unilateral vestibular schwannoma [                                Physical Exam  General:  Well nourished       Airway/Jaw/Neck:  Airway Findings: Mouth Opening: Normal     Tongue: Normal           Mallampati: II   TM Distance: Normal, at least 6 cm   Jaw/Neck Findings:     Neck ROM: Normal ROM          Dental:  Dental Findings: In tact      Chest/Lungs:  Chest/Lungs Findings:  Normal Respiratory Rate, Clear to auscultation       Heart/Vascular:  Heart Findings: Rate: Normal  Rhythm: Regular Rhythm                             Anesthesia Plan  Type of Anesthesia, risks & benefits discussed:  Anesthesia Type:  general    Patient's Preference:   Plan Factors:          Intra-op Monitoring Plan: standard ASA monitors and arterial line  Intra-op Monitoring Plan Comments:   Post Op Pain Control Plan:   Post Op Pain Control Plan Comments:     Induction:   IV  Beta Blocker:         Informed Consent: Informed consent signed with the Patient and all parties understand the risks and agree with anesthesia plan.  All questions answered.  Anesthesia consent signed with patient.  ASA Score: 2     Day of Surgery Review of History & Physical:        Anesthesia Plan Notes: No zofran        Ready For Surgery From Anesthesia Perspective.             Physical Exam  General: Well nourished    Airway:  Mallampati: II   Mouth Opening: Normal  TM Distance: Normal, at least 6 cm  Tongue: Normal  Neck  ROM: Normal ROM    Dental:  In tact    Chest/Lungs:  Normal Respiratory Rate, Clear to auscultation    Heart:  Rate: Normal  Rhythm: Regular Rhythm        Anesthesia Plan  Type of Anesthesia, risks & benefits discussed:    Anesthesia Type: general  Intra-op Monitoring Plan: standard ASA monitors and arterial line  Induction:  IV  Informed Consent: Informed consent signed with the Patient and all parties understand the risks and agree with anesthesia plan.  All questions answered.   ASA Score: 2  Anesthesia Plan Notes: No zofran    Ready For Surgery From Anesthesia Perspective.     .

## 2025-05-08 NOTE — ANESTHESIA PROCEDURE NOTES
Intubation    Date/Time: 5/8/2025 7:40 AM    Performed by: Penelope Kumar CRNA  Authorized by: Leopold, Rhonda G, MD    Intubation:     Induction:  Intravenous    Intubated:  Postinduction    Mask Ventilation:  Easy mask    Attempts:  1    Attempted By:  CRNA    Method of Intubation:  Video laryngoscopy    Blade:  Huffman 3    Laryngeal View Grade: Grade I - full view of cords      Difficult Airway Encountered?: No      Complications:  None    Airway Device:  EMG ETT (NIMS)    Airway Device Size:  7.0    Style/Cuff Inflation:  Cuffed (inflated to minimal occlusive pressure)    Tube secured:  19    Secured at:  The lips    Placement Verified By:  Capnometry    Complicating Factors:  Small mouth    Findings Post-Intubation:  BS equal bilateral and atraumatic/condition of teeth unchanged

## 2025-05-08 NOTE — ANESTHESIA PROCEDURE NOTES
Arterial    Diagnosis: Acoustic Neuroma    Patient location during procedure: done in OR  Timeout: 5/8/2025 7:44 AM  Procedure end time: 5/8/2025 7:58 AM    Staffing  Authorizing Provider: Leopold, Rhonda G, MD  Performing Provider: Penelope Kumar CRNA    Staffing  Performed by: Penelope Kumar CRNA  Authorized by: Leopold, Rhonda G, MD    Anesthesiologist was present at the time of the procedure.    Preanesthetic Checklist  Completed: patient identified, IV checked, site marked, risks and benefits discussed, surgical consent, monitors and equipment checked, pre-op evaluation, timeout performed and anesthesia consent givenArterial  Skin Prep: chlorhexidine gluconate  Local Infiltration: none  Orientation: right  Location: radial    Catheter Size: 20 G  Catheter placement by Anatomical landmarks. Heme positive aspiration all ports. Insertion Attempts: 2  Assessment  Dressing: secured with tape and tegaderm  Patient: Tolerated well

## 2025-05-09 PROBLEM — Z99.11 ON MECHANICALLY ASSISTED VENTILATION: Status: ACTIVE | Noted: 2025-05-09

## 2025-05-09 LAB
ABSOLUTE EOSINOPHIL (OHS): 0 K/UL
ABSOLUTE MONOCYTE (OHS): 1.2 K/UL (ref 0.3–1)
ABSOLUTE NEUTROPHIL COUNT (OHS): 14.7 K/UL (ref 1.8–7.7)
ALBUMIN SERPL BCP-MCNC: 3 G/DL (ref 3.5–5.2)
ALLENS TEST: ABNORMAL
ALP SERPL-CCNC: 51 UNIT/L (ref 40–150)
ALT SERPL W/O P-5'-P-CCNC: 7 UNIT/L (ref 10–44)
ANION GAP (OHS): 12 MMOL/L (ref 8–16)
APTT PPP: 23.4 SECONDS (ref 21–32)
AST SERPL-CCNC: 12 UNIT/L (ref 11–45)
BASOPHILS # BLD AUTO: 0.03 K/UL
BASOPHILS NFR BLD AUTO: 0.2 %
BILIRUB SERPL-MCNC: 0.3 MG/DL (ref 0.1–1)
BUN SERPL-MCNC: 4 MG/DL (ref 6–20)
CALCIUM SERPL-MCNC: 8.1 MG/DL (ref 8.7–10.5)
CHLORIDE SERPL-SCNC: 114 MMOL/L (ref 95–110)
CO2 SERPL-SCNC: 16 MMOL/L (ref 23–29)
CREAT SERPL-MCNC: 0.7 MG/DL (ref 0.5–1.4)
DELSYS: ABNORMAL
ERYTHROCYTE [DISTWIDTH] IN BLOOD BY AUTOMATED COUNT: 12.8 % (ref 11.5–14.5)
ERYTHROCYTE [SEDIMENTATION RATE] IN BLOOD BY WESTERGREN METHOD: 14 MM/H
FIO2: 40
GFR SERPLBLD CREATININE-BSD FMLA CKD-EPI: >60 ML/MIN/1.73/M2
GLUCOSE SERPL-MCNC: 120 MG/DL (ref 70–110)
GLUCOSE SERPL-MCNC: 123 MG/DL (ref 70–110)
GLUCOSE SERPL-MCNC: 125 MG/DL (ref 70–110)
GLUCOSE SERPL-MCNC: 126 MG/DL (ref 70–110)
GLUCOSE SERPL-MCNC: 127 MG/DL (ref 70–110)
GLUCOSE SERPL-MCNC: 155 MG/DL (ref 70–110)
HCO3 UR-SCNC: 17.6 MMOL/L (ref 24–28)
HCO3 UR-SCNC: 17.8 MMOL/L (ref 24–28)
HCO3 UR-SCNC: 18.2 MMOL/L (ref 24–28)
HCO3 UR-SCNC: 18.5 MMOL/L (ref 24–28)
HCO3 UR-SCNC: 19.1 MMOL/L (ref 24–28)
HCO3 UR-SCNC: 20.4 MMOL/L (ref 24–28)
HCT VFR BLD AUTO: 34.6 % (ref 37–48.5)
HCT VFR BLD CALC: 26 %PCV (ref 36–54)
HCT VFR BLD CALC: 27 %PCV (ref 36–54)
HCT VFR BLD CALC: 28 %PCV (ref 36–54)
HCT VFR BLD CALC: 28 %PCV (ref 36–54)
HCT VFR BLD CALC: 29 %PCV (ref 36–54)
HGB BLD-MCNC: 11.6 GM/DL (ref 12–16)
IMM GRANULOCYTES # BLD AUTO: 0.09 K/UL (ref 0–0.04)
IMM GRANULOCYTES NFR BLD AUTO: 0.5 % (ref 0–0.5)
INR PPP: 1 (ref 0.8–1.2)
LACTATE SERPL-SCNC: 2.8 MMOL/L (ref 0.5–2.2)
LACTATE SERPL-SCNC: 5.5 MMOL/L (ref 0.5–2.2)
LYMPHOCYTES # BLD AUTO: 1.14 K/UL (ref 1–4.8)
MAGNESIUM SERPL-MCNC: 2 MG/DL (ref 1.6–2.6)
MCH RBC QN AUTO: 31.3 PG (ref 27–31)
MCHC RBC AUTO-ENTMCNC: 33.5 G/DL (ref 32–36)
MCV RBC AUTO: 93 FL (ref 82–98)
MODE: ABNORMAL
NUCLEATED RBC (/100WBC) (OHS): 0 /100 WBC
OHS QRS DURATION: 84 MS
OHS QTC CALCULATION: 436 MS
PCO2 BLDA: 26.3 MMHG (ref 35–45)
PCO2 BLDA: 28.7 MMHG (ref 35–45)
PCO2 BLDA: 31.2 MMHG (ref 35–45)
PCO2 BLDA: 31.4 MMHG (ref 35–45)
PCO2 BLDA: 31.6 MMHG (ref 35–45)
PCO2 BLDA: 37.7 MMHG (ref 35–45)
PH SMN: 7.28 [PH] (ref 7.35–7.45)
PH SMN: 7.38 [PH] (ref 7.35–7.45)
PH SMN: 7.39 [PH] (ref 7.35–7.45)
PH SMN: 7.4 [PH] (ref 7.35–7.45)
PH SMN: 7.42 [PH] (ref 7.35–7.45)
PH SMN: 7.45 [PH] (ref 7.35–7.45)
PHOSPHATE SERPL-MCNC: 3 MG/DL (ref 2.7–4.5)
PLATELET # BLD AUTO: 238 K/UL (ref 150–450)
PMV BLD AUTO: 10.2 FL (ref 9.2–12.9)
PO2 BLDA: 143 MMHG (ref 80–100)
PO2 BLDA: 180 MMHG (ref 80–100)
PO2 BLDA: 180 MMHG (ref 80–100)
PO2 BLDA: 186 MMHG (ref 80–100)
PO2 BLDA: 194 MMHG (ref 80–100)
PO2 BLDA: 201 MMHG (ref 80–100)
POC BE: -4 MMOL/L (ref -2–2)
POC BE: -6 MMOL/L (ref -2–2)
POC BE: -6 MMOL/L (ref -2–2)
POC BE: -7 MMOL/L (ref -2–2)
POC BE: -7 MMOL/L (ref -2–2)
POC BE: -9 MMOL/L (ref -2–2)
POC IONIZED CALCIUM: 1.11 MMOL/L (ref 1.06–1.42)
POC IONIZED CALCIUM: 1.11 MMOL/L (ref 1.06–1.42)
POC IONIZED CALCIUM: 1.13 MMOL/L (ref 1.06–1.42)
POC IONIZED CALCIUM: 1.13 MMOL/L (ref 1.06–1.42)
POC IONIZED CALCIUM: 1.15 MMOL/L (ref 1.06–1.42)
POC SATURATED O2: 100 % (ref 95–100)
POC SATURATED O2: 99 % (ref 95–100)
POC SATURATED O2: 99 % (ref 95–100)
POC TCO2: 18 MMOL/L (ref 23–27)
POC TCO2: 19 MMOL/L (ref 23–27)
POC TCO2: 20 MMOL/L (ref 23–27)
POC TCO2: 21 MMOL/L (ref 23–27)
POCT GLUCOSE: 142 MG/DL (ref 70–110)
POCT GLUCOSE: 148 MG/DL (ref 70–110)
POCT GLUCOSE: 153 MG/DL (ref 70–110)
POCT GLUCOSE: 181 MG/DL (ref 70–110)
POTASSIUM BLD-SCNC: 3.5 MMOL/L (ref 3.5–5.1)
POTASSIUM BLD-SCNC: 3.6 MMOL/L (ref 3.5–5.1)
POTASSIUM BLD-SCNC: 3.7 MMOL/L (ref 3.5–5.1)
POTASSIUM SERPL-SCNC: 3.4 MMOL/L (ref 3.5–5.1)
PROT SERPL-MCNC: 5.8 GM/DL (ref 6–8.4)
PROTHROMBIN TIME: 10.6 SECONDS (ref 9–12.5)
RBC # BLD AUTO: 3.71 M/UL (ref 4–5.4)
RELATIVE EOSINOPHIL (OHS): 0 %
RELATIVE LYMPHOCYTE (OHS): 6.6 % (ref 18–48)
RELATIVE MONOCYTE (OHS): 7 % (ref 4–15)
RELATIVE NEUTROPHIL (OHS): 85.7 % (ref 38–73)
SAMPLE: ABNORMAL
SITE: ABNORMAL
SODIUM BLD-SCNC: 143 MMOL/L (ref 136–145)
SODIUM BLD-SCNC: 143 MMOL/L (ref 136–145)
SODIUM BLD-SCNC: 144 MMOL/L (ref 136–145)
SODIUM SERPL-SCNC: 142 MMOL/L (ref 136–145)
SP02: 100
VT: 400
WBC # BLD AUTO: 17.16 K/UL (ref 3.9–12.7)

## 2025-05-09 PROCEDURE — 85025 COMPLETE CBC W/AUTO DIFF WBC: CPT

## 2025-05-09 PROCEDURE — 94010 BREATHING CAPACITY TEST: CPT

## 2025-05-09 PROCEDURE — 25000003 PHARM REV CODE 250: Performed by: NURSE PRACTITIONER

## 2025-05-09 PROCEDURE — 63600175 PHARM REV CODE 636 W HCPCS: Performed by: STUDENT IN AN ORGANIZED HEALTH CARE EDUCATION/TRAINING PROGRAM

## 2025-05-09 PROCEDURE — 94761 N-INVAS EAR/PLS OXIMETRY MLT: CPT

## 2025-05-09 PROCEDURE — 63600175 PHARM REV CODE 636 W HCPCS

## 2025-05-09 PROCEDURE — 99291 CRITICAL CARE FIRST HOUR: CPT | Mod: ,,,

## 2025-05-09 PROCEDURE — 80053 COMPREHEN METABOLIC PANEL: CPT

## 2025-05-09 PROCEDURE — 97165 OT EVAL LOW COMPLEX 30 MIN: CPT

## 2025-05-09 PROCEDURE — 25000003 PHARM REV CODE 250: Performed by: STUDENT IN AN ORGANIZED HEALTH CARE EDUCATION/TRAINING PROGRAM

## 2025-05-09 PROCEDURE — 97162 PT EVAL MOD COMPLEX 30 MIN: CPT

## 2025-05-09 PROCEDURE — 25500020 PHARM REV CODE 255: Performed by: PSYCHIATRY & NEUROLOGY

## 2025-05-09 PROCEDURE — 83735 ASSAY OF MAGNESIUM: CPT

## 2025-05-09 PROCEDURE — 94003 VENT MGMT INPAT SUBQ DAY: CPT

## 2025-05-09 PROCEDURE — 97530 THERAPEUTIC ACTIVITIES: CPT

## 2025-05-09 PROCEDURE — 97116 GAIT TRAINING THERAPY: CPT

## 2025-05-09 PROCEDURE — 99900017 HC EXTUBATION W/PARAMETERS (STAT)

## 2025-05-09 PROCEDURE — 83605 ASSAY OF LACTIC ACID: CPT

## 2025-05-09 PROCEDURE — 97535 SELF CARE MNGMENT TRAINING: CPT

## 2025-05-09 PROCEDURE — 27100171 HC OXYGEN HIGH FLOW UP TO 24 HOURS

## 2025-05-09 PROCEDURE — 63600175 PHARM REV CODE 636 W HCPCS: Performed by: NURSE PRACTITIONER

## 2025-05-09 PROCEDURE — A9585 GADOBUTROL INJECTION: HCPCS | Performed by: PSYCHIATRY & NEUROLOGY

## 2025-05-09 PROCEDURE — 85730 THROMBOPLASTIN TIME PARTIAL: CPT

## 2025-05-09 PROCEDURE — 85610 PROTHROMBIN TIME: CPT

## 2025-05-09 PROCEDURE — 25000003 PHARM REV CODE 250

## 2025-05-09 PROCEDURE — 93010 ELECTROCARDIOGRAM REPORT: CPT | Mod: ,,, | Performed by: INTERNAL MEDICINE

## 2025-05-09 PROCEDURE — 92610 EVALUATE SWALLOWING FUNCTION: CPT

## 2025-05-09 PROCEDURE — 82803 BLOOD GASES ANY COMBINATION: CPT

## 2025-05-09 PROCEDURE — 63600175 PHARM REV CODE 636 W HCPCS: Performed by: NURSE ANESTHETIST, CERTIFIED REGISTERED

## 2025-05-09 PROCEDURE — 25000003 PHARM REV CODE 250: Performed by: PSYCHIATRY & NEUROLOGY

## 2025-05-09 PROCEDURE — 99900035 HC TECH TIME PER 15 MIN (STAT)

## 2025-05-09 PROCEDURE — 37799 UNLISTED PX VASCULAR SURGERY: CPT

## 2025-05-09 PROCEDURE — 94150 VITAL CAPACITY TEST: CPT

## 2025-05-09 PROCEDURE — 99900026 HC AIRWAY MAINTENANCE (STAT)

## 2025-05-09 PROCEDURE — 20000000 HC ICU ROOM

## 2025-05-09 PROCEDURE — 84100 ASSAY OF PHOSPHORUS: CPT

## 2025-05-09 PROCEDURE — 93005 ELECTROCARDIOGRAM TRACING: CPT

## 2025-05-09 RX ORDER — FAMOTIDINE 10 MG/ML
20 INJECTION, SOLUTION INTRAVENOUS 2 TIMES DAILY
Status: DISCONTINUED | OUTPATIENT
Start: 2025-05-09 | End: 2025-05-09

## 2025-05-09 RX ORDER — SODIUM,POTASSIUM PHOSPHATES 280-250MG
2 POWDER IN PACKET (EA) ORAL
Status: DISCONTINUED | OUTPATIENT
Start: 2025-05-09 | End: 2025-05-09

## 2025-05-09 RX ORDER — LANOLIN ALCOHOL/MO/W.PET/CERES
800 CREAM (GRAM) TOPICAL
Status: DISCONTINUED | OUTPATIENT
Start: 2025-05-09 | End: 2025-05-10 | Stop reason: HOSPADM

## 2025-05-09 RX ORDER — GLUCAGON 1 MG
1 KIT INJECTION
Status: DISCONTINUED | OUTPATIENT
Start: 2025-05-09 | End: 2025-05-10

## 2025-05-09 RX ORDER — SODIUM,POTASSIUM PHOSPHATES 280-250MG
2 POWDER IN PACKET (EA) ORAL
Status: DISCONTINUED | OUTPATIENT
Start: 2025-05-09 | End: 2025-05-10 | Stop reason: HOSPADM

## 2025-05-09 RX ORDER — GADOBUTROL 604.72 MG/ML
8 INJECTION INTRAVENOUS
Status: COMPLETED | OUTPATIENT
Start: 2025-05-09 | End: 2025-05-09

## 2025-05-09 RX ORDER — OXYCODONE HYDROCHLORIDE 5 MG/1
5 TABLET ORAL EVERY 4 HOURS PRN
Refills: 0 | Status: DISCONTINUED | OUTPATIENT
Start: 2025-05-09 | End: 2025-05-10 | Stop reason: HOSPADM

## 2025-05-09 RX ORDER — HYDROMORPHONE HYDROCHLORIDE 1 MG/ML
INJECTION, SOLUTION INTRAMUSCULAR; INTRAVENOUS; SUBCUTANEOUS
Status: DISCONTINUED | OUTPATIENT
Start: 2025-05-09 | End: 2025-05-09

## 2025-05-09 RX ORDER — MUPIROCIN 20 MG/G
OINTMENT TOPICAL 2 TIMES DAILY
Status: DISCONTINUED | OUTPATIENT
Start: 2025-05-09 | End: 2025-05-10 | Stop reason: HOSPADM

## 2025-05-09 RX ORDER — CEFAZOLIN 2 G/1
2 INJECTION, POWDER, FOR SOLUTION INTRAMUSCULAR; INTRAVENOUS
Status: COMPLETED | OUTPATIENT
Start: 2025-05-09 | End: 2025-05-09

## 2025-05-09 RX ORDER — AMOXICILLIN 250 MG
1 CAPSULE ORAL 2 TIMES DAILY
Status: DISCONTINUED | OUTPATIENT
Start: 2025-05-09 | End: 2025-05-09

## 2025-05-09 RX ORDER — POTASSIUM CHLORIDE 7.45 MG/ML
10 INJECTION INTRAVENOUS ONCE
Status: COMPLETED | OUTPATIENT
Start: 2025-05-09 | End: 2025-05-09

## 2025-05-09 RX ORDER — NICARDIPINE HYDROCHLORIDE 0.2 MG/ML
0-15 INJECTION INTRAVENOUS CONTINUOUS
Status: DISCONTINUED | OUTPATIENT
Start: 2025-05-09 | End: 2025-05-10

## 2025-05-09 RX ORDER — ACETAMINOPHEN 325 MG/1
650 TABLET ORAL EVERY 6 HOURS PRN
Status: DISCONTINUED | OUTPATIENT
Start: 2025-05-09 | End: 2025-05-10 | Stop reason: HOSPADM

## 2025-05-09 RX ORDER — ACETAMINOPHEN 325 MG/1
650 TABLET ORAL EVERY 6 HOURS PRN
Status: DISCONTINUED | OUTPATIENT
Start: 2025-05-09 | End: 2025-05-09

## 2025-05-09 RX ORDER — LANOLIN ALCOHOL/MO/W.PET/CERES
800 CREAM (GRAM) TOPICAL
Status: DISCONTINUED | OUTPATIENT
Start: 2025-05-09 | End: 2025-05-09

## 2025-05-09 RX ORDER — AMOXICILLIN 250 MG
1 CAPSULE ORAL 2 TIMES DAILY
Status: DISCONTINUED | OUTPATIENT
Start: 2025-05-09 | End: 2025-05-10 | Stop reason: HOSPADM

## 2025-05-09 RX ORDER — OXYCODONE HYDROCHLORIDE 5 MG/1
10 TABLET ORAL EVERY 4 HOURS PRN
Refills: 0 | Status: DISCONTINUED | OUTPATIENT
Start: 2025-05-09 | End: 2025-05-09

## 2025-05-09 RX ORDER — PROMETHAZINE HYDROCHLORIDE 25 MG/ML
25 INJECTION, SOLUTION INTRAMUSCULAR; INTRAVENOUS EVERY 6 HOURS PRN
Status: DISCONTINUED | OUTPATIENT
Start: 2025-05-09 | End: 2025-05-09

## 2025-05-09 RX ORDER — METOCLOPRAMIDE HYDROCHLORIDE 5 MG/ML
5 INJECTION INTRAMUSCULAR; INTRAVENOUS EVERY 6 HOURS PRN
Status: DISCONTINUED | OUTPATIENT
Start: 2025-05-09 | End: 2025-05-09

## 2025-05-09 RX ORDER — POLYETHYLENE GLYCOL 3350 17 G/17G
17 POWDER, FOR SOLUTION ORAL DAILY
Status: DISCONTINUED | OUTPATIENT
Start: 2025-05-09 | End: 2025-05-09

## 2025-05-09 RX ORDER — OXYCODONE HYDROCHLORIDE 5 MG/1
5 TABLET ORAL EVERY 4 HOURS PRN
Refills: 0 | Status: DISCONTINUED | OUTPATIENT
Start: 2025-05-09 | End: 2025-05-09

## 2025-05-09 RX ORDER — FENTANYL CITRATE-0.9 % NACL/PF 10 MCG/ML
0-250 PLASTIC BAG, INJECTION (ML) INTRAVENOUS CONTINUOUS
Refills: 0 | Status: DISCONTINUED | OUTPATIENT
Start: 2025-05-09 | End: 2025-05-09

## 2025-05-09 RX ORDER — ACETAMINOPHEN 325 MG/1
650 TABLET ORAL EVERY 4 HOURS PRN
Status: DISCONTINUED | OUTPATIENT
Start: 2025-05-09 | End: 2025-05-09

## 2025-05-09 RX ORDER — PROMETHAZINE HYDROCHLORIDE 12.5 MG/1
25 TABLET ORAL EVERY 6 HOURS PRN
Status: DISCONTINUED | OUTPATIENT
Start: 2025-05-09 | End: 2025-05-09

## 2025-05-09 RX ORDER — DEXAMETHASONE SODIUM PHOSPHATE 4 MG/ML
4 INJECTION, SOLUTION INTRA-ARTICULAR; INTRALESIONAL; INTRAMUSCULAR; INTRAVENOUS; SOFT TISSUE EVERY 6 HOURS
Status: DISCONTINUED | OUTPATIENT
Start: 2025-05-09 | End: 2025-05-10 | Stop reason: HOSPADM

## 2025-05-09 RX ORDER — OXYCODONE HYDROCHLORIDE 5 MG/1
10 TABLET ORAL EVERY 4 HOURS PRN
Refills: 0 | Status: DISCONTINUED | OUTPATIENT
Start: 2025-05-09 | End: 2025-05-10 | Stop reason: HOSPADM

## 2025-05-09 RX ORDER — POLYETHYLENE GLYCOL 3350 17 G/17G
17 POWDER, FOR SOLUTION ORAL DAILY
Status: DISCONTINUED | OUTPATIENT
Start: 2025-05-10 | End: 2025-05-10 | Stop reason: HOSPADM

## 2025-05-09 RX ORDER — FAMOTIDINE 10 MG/ML
20 INJECTION, SOLUTION INTRAVENOUS EVERY 12 HOURS
Status: DISCONTINUED | OUTPATIENT
Start: 2025-05-09 | End: 2025-05-10 | Stop reason: HOSPADM

## 2025-05-09 RX ORDER — PROPOFOL 10 MG/ML
0-50 INJECTION, EMULSION INTRAVENOUS CONTINUOUS
Status: DISCONTINUED | OUTPATIENT
Start: 2025-05-09 | End: 2025-05-09

## 2025-05-09 RX ORDER — OXYCODONE HYDROCHLORIDE 5 MG/1
5 TABLET ORAL EVERY 4 HOURS PRN
Status: DISCONTINUED | OUTPATIENT
Start: 2025-05-09 | End: 2025-05-09

## 2025-05-09 RX ORDER — OXYCODONE HYDROCHLORIDE 5 MG/1
10 TABLET ORAL EVERY 4 HOURS PRN
Status: DISCONTINUED | OUTPATIENT
Start: 2025-05-09 | End: 2025-05-09

## 2025-05-09 RX ORDER — INSULIN ASPART 100 [IU]/ML
0-5 INJECTION, SOLUTION INTRAVENOUS; SUBCUTANEOUS EVERY 6 HOURS PRN
Status: DISCONTINUED | OUTPATIENT
Start: 2025-05-09 | End: 2025-05-10

## 2025-05-09 RX ADMIN — PROPOFOL 45 MCG/KG/MIN: 10 INJECTION, EMULSION INTRAVENOUS at 02:05

## 2025-05-09 RX ADMIN — ACETAMINOPHEN 650 MG: 325 TABLET ORAL at 06:05

## 2025-05-09 RX ADMIN — OXYCODONE 10 MG: 5 TABLET ORAL at 10:05

## 2025-05-09 RX ADMIN — GADOBUTROL 8 ML: 604.72 INJECTION INTRAVENOUS at 06:05

## 2025-05-09 RX ADMIN — DEXAMETHASONE SODIUM PHOSPHATE 4 MG: 4 INJECTION INTRA-ARTICULAR; INTRALESIONAL; INTRAMUSCULAR; INTRAVENOUS; SOFT TISSUE at 06:05

## 2025-05-09 RX ADMIN — PROMETHAZINE HYDROCHLORIDE 12.5 MG: 25 INJECTION INTRAMUSCULAR; INTRAVENOUS at 09:05

## 2025-05-09 RX ADMIN — PROPOFOL 40 MCG/KG/MIN: 10 INJECTION, EMULSION INTRAVENOUS at 02:05

## 2025-05-09 RX ADMIN — PROCHLORPERAZINE EDISYLATE 5 MG: 5 INJECTION INTRAMUSCULAR; INTRAVENOUS at 06:05

## 2025-05-09 RX ADMIN — SODIUM CHLORIDE 1000 ML: 9 INJECTION, SOLUTION INTRAVENOUS at 06:05

## 2025-05-09 RX ADMIN — CEFAZOLIN 2 G: 2 INJECTION, POWDER, FOR SOLUTION INTRAMUSCULAR; INTRAVENOUS at 08:05

## 2025-05-09 RX ADMIN — SENNOSIDES AND DOCUSATE SODIUM 1 TABLET: 50; 8.6 TABLET ORAL at 09:05

## 2025-05-09 RX ADMIN — HYDROMORPHONE HYDROCHLORIDE 1 MG: 1 INJECTION, SOLUTION INTRAMUSCULAR; INTRAVENOUS; SUBCUTANEOUS at 12:05

## 2025-05-09 RX ADMIN — MUPIROCIN: 20 OINTMENT TOPICAL at 08:05

## 2025-05-09 RX ADMIN — CEFAZOLIN 2 G: 2 INJECTION, POWDER, FOR SOLUTION INTRAMUSCULAR; INTRAVENOUS at 01:05

## 2025-05-09 RX ADMIN — POTASSIUM BICARBONATE 35 MEQ: 391 TABLET, EFFERVESCENT ORAL at 08:05

## 2025-05-09 RX ADMIN — LABETALOL HYDROCHLORIDE 10 MG: 5 INJECTION, SOLUTION INTRAVENOUS at 12:05

## 2025-05-09 RX ADMIN — OXYCODONE 5 MG: 5 TABLET ORAL at 09:05

## 2025-05-09 RX ADMIN — FAMOTIDINE 20 MG: 10 INJECTION, SOLUTION INTRAVENOUS at 09:05

## 2025-05-09 RX ADMIN — MUPIROCIN: 20 OINTMENT TOPICAL at 09:05

## 2025-05-09 RX ADMIN — PROMETHAZINE HYDROCHLORIDE 12.5 MG: 25 INJECTION INTRAMUSCULAR; INTRAVENOUS at 12:05

## 2025-05-09 RX ADMIN — FAMOTIDINE 20 MG: 10 INJECTION, SOLUTION INTRAVENOUS at 08:05

## 2025-05-09 RX ADMIN — POTASSIUM BICARBONATE 35 MEQ: 391 TABLET, EFFERVESCENT ORAL at 06:05

## 2025-05-09 RX ADMIN — Medication 25 MCG/HR: at 02:05

## 2025-05-09 RX ADMIN — LABETALOL HYDROCHLORIDE 10 MG: 5 INJECTION, SOLUTION INTRAVENOUS at 10:05

## 2025-05-09 RX ADMIN — NICARDIPINE HYDROCHLORIDE 2.5 MG/HR: 0.2 INJECTION, SOLUTION INTRAVENOUS at 08:05

## 2025-05-09 RX ADMIN — SENNOSIDES AND DOCUSATE SODIUM 1 TABLET: 50; 8.6 TABLET ORAL at 08:05

## 2025-05-09 RX ADMIN — DEXAMETHASONE SODIUM PHOSPHATE 4 MG: 4 INJECTION INTRA-ARTICULAR; INTRALESIONAL; INTRAMUSCULAR; INTRAVENOUS; SOFT TISSUE at 11:05

## 2025-05-09 RX ADMIN — POTASSIUM CHLORIDE 10 MEQ: 7.46 INJECTION, SOLUTION INTRAVENOUS at 10:05

## 2025-05-09 RX ADMIN — POLYETHYLENE GLYCOL 3350 17 G: 17 POWDER, FOR SOLUTION ORAL at 08:05

## 2025-05-09 RX ADMIN — NICARDIPINE HYDROCHLORIDE 5 MG/HR: 0.2 INJECTION, SOLUTION INTRAVENOUS at 01:05

## 2025-05-09 RX ADMIN — SODIUM CHLORIDE 1000 ML: 9 INJECTION, SOLUTION INTRAVENOUS at 12:05

## 2025-05-09 NOTE — PT/OT/SLP EVAL
Physical Therapy Co-Evaluation and Discharge Note    Patient Name:  Bibiana Go   MRN:  [id number]    Recommendations:     Discharge Recommendations: No Therapy Indicated  Discharge Equipment Recommendations: none   Barriers to discharge: None    Assessment:     Bibiana Go is a 49 y.o. female admitted with a medical diagnosis of Acoustic neuroma. At this time, patient is functioning at their prior level of function and does not require further acute PT services. She demonstrates mild generalized weakness 2/2 prolonged bed rest and would benefit from CGA when ambulating but likely to progress back to PLOF quickly w/ gradual return to daily routine. No skilled PT needs at this time. She did have hypertension initially upon sitting up w/ SBP in 150s (goal <140), RN alerted and began Cardene, then BP returned to <140 and remained within parameters throughout rest of tx session.    Recent Surgery: Procedure(s) (LRB):  CRANIOTOMY, TRANSLABYRINTHINE APPROACH, WITH SURGICAL REMOVAL OF ACOUSTIC NEUROMA (Left)  CRANIOTOMY, TRANSLABYRINTHINE APPROACH, WITH SURGICAL REMOVAL OF ACOUSTIC NEUROMA, Fat graft (Left) 1 Day Post-Op    Plan:     During this hospitalization, patient does not require further acute PT services.  Please re-consult if situation changes.      Subjective     Chief Complaint: R facial droop  Patient/Family Comments/goals: to get therapy to work on face/speech/swallow  Pain/Comfort:  Pain Rating 1: 0/10  Pain Rating Post-Intervention 1: 0/10    Patients cultural, spiritual, Evangelical conflicts given the current situation: no    Living Environment:  Pt lives w/ hsb, 21yo + 17yo kids and 2 black labs in a [facility] w/ no DARELL, she has a [facility] w/ built in seat and handheld shower head.  Prior to admission, patients level of function was independent, work-from-home .  Equipment used at home: none.  DME owned (not currently used): none.  Upon discharge, patient will have assistance from family  24/7.    Objective:     Communicated with RN prior to session.  Patient found HOB elevated with blood pressure cuff, pulse ox (continuous), telemetry, peripheral IV, PureWick upon PT entry to room. Co-evaluation w/ OT 2/2 suspected pt complexity and requirement of assistance from 2 skilled therapists to maximize treatment potential and maintain pt safety     General Precautions: Standard, fall    Orthopedic Precautions:N/A   Braces: N/A  Respiratory Status: Room air    Exams:  Cognitive Exam:  Patient is oriented to Person, Place, Time, and Situation  Sensation:    -       Intact  RLE ROM: WFL  RLE Strength: WFL  LLE ROM: WFL  LLE Strength: WFL    Functional Mobility:  Bed Mobility:     Supine to Sit: contact guard assistance  Transfers:     Sit to Stand:  contact guard assistance with no AD  Bed to Chair: contact guard assistance with  no AD  using  Step Transfer  Toilet Transfer: contact guard assistance with  no AD  using  Step Transfer  Gait: 2x 16' + 30' w/ CGA no AD; pt w/ mild inc postural sway, WBOS, dec vince, and dec trunk rotation/arm swing  Tinetti Total Score: 22  < 18 = High Risk of Falls, 19-23 = Moderate Risk of Falls, > 24 = Low Risk of Falls    AM-PAC 6 CLICK MOBILITY  Total Score:19       Treatment and Education:  Pt and family educated on PT assessment and plan to d/c orders 2/2 no current needs for skilled PT. Pt instructed to slowly progress mobility each day towards PLOF without attempting to jump right into previous daily routine immediately upon d/c 2/2 general deconditioning from being in hospital. Pt and family in agreement w/ POC and verbalized understanding w/ plan to slowly progress to normal daily activities at home and inform MD team if pt experiences any difficulty progressing back to PLOF while here or upon d/c in order for PT to be re-consulted/orders placed for OP PT as needed.     AM-PAC 6 CLICK MOBILITY  Total Score:19     Patient left up in chair with all lines intact, call  button in reach, RN notified, and family present.      History:     Past Medical History:   Diagnosis Date    Acoustic neuroma     Asthma     as a child    History of PSVT (paroxysmal supraventricular tachycardia)     no longer needs medication       Past Surgical History:   Procedure Laterality Date     SECTION       SECTION WITH TUBAL LIGATION      CRANIOTOMY, TRANSLABYRINTHINE APPROACH, WITH SURGICAL REMOVAL OF ACOUSTIC NEUROMA Left 2025    Procedure: CRANIOTOMY, TRANSLABYRINTHINE APPROACH, WITH SURGICAL REMOVAL OF ACOUSTIC NEUROMA;  Surgeon: Farzad Mccord DO;  Location: [facility] OR [address] [place] [address];  Service: Neurosurgery;  Laterality: Left;    CRANIOTOMY, TRANSLABYRINTHINE APPROACH, WITH SURGICAL REMOVAL OF ACOUSTIC NEUROMA Left 2025    Procedure: CRANIOTOMY, TRANSLABYRINTHINE APPROACH, WITH SURGICAL REMOVAL OF ACOUSTIC NEUROMA, Fat graft;  Surgeon: Dean Varela MD;  Location: [facility] OR [address] [place] [address];  Service: ENT;  Laterality: Left;  Fat graft    CYSTOSCOPY N/A 2019    Procedure: CYSTOSCOPY;  Surgeon: Francisco Javier Bales MD;  Location: [facility] OR;  Service: OB/GYN;  Laterality: N/A;    HYSTERECTOMY  2019    OOPHORECTOMY  2019    one removed w/Hysterectomy    ROBOT-ASSISTED LAPAROSCOPIC HYSTERECTOMY N/A 2019    Procedure: ROBOTIC HYSTERECTOMY;  Surgeon: Francisco Javier Bales MD;  Location: [facility] OR;  Service: OB/GYN;  Laterality: N/A;    ROBOT-ASSISTED LAPAROSCOPIC OOPHORECTOMY Right 2019    Procedure: ROBOTIC OOPHORECTOMY;  Surgeon: Francisco Javier Bales MD;  Location: [facility] OR;  Service: OB/GYN;  Laterality: Right;    ROBOT-ASSISTED SALPINGECTOMY Bilateral 2019    Procedure: ROBOTIC SALPINGECTOMY;  Surgeon: Francisco Javier Bales MD;  Location: [facility] OR;  Service: OB/GYN;  Laterality: Bilateral;    TUBAL LIGATION         Time Tracking:     PT Received On: 25  PT Start Time: 1255     PT Stop Time: 1334  PT Total Time (min): 39 min     Billable Minutes: Evaluation 9, Gait Training  15, and Therapeutic Activity 15      05/09/2025

## 2025-05-09 NOTE — PROGRESS NOTES
[facility] - Neuro Critical Care  Neurosurgery  Progress Note    Subjective:     History of Present Illness: Pt presents for elective translab craniotomy for left acoustic neuroma resection    Post-Op Info:  Procedure(s) (LRB):  CRANIOTOMY, TRANSLABYRINTHINE APPROACH, WITH SURGICAL REMOVAL OF ACOUSTIC NEUROMA (Left)  CRANIOTOMY, TRANSLABYRINTHINE APPROACH, WITH SURGICAL REMOVAL OF ACOUSTIC NEUROMA, Fat graft (Left)   1 Day Post-Op   Interval History: 5/9 POD1 tolerated procedure well, monitored in ICU overnight intubated. CTH with expected changes. Plan to extubate today, MRI pending    Medications:  Continuous Infusions:   nicardipine  0-15 mg/hr Intravenous Continuous 12.5 mL/hr at 05/09/25 0812 2.5 mg/hr at 05/09/25 0812     Scheduled Meds:   dexAMETHasone injection  4 mg Intravenous Q6H    famotidine (PF)  20 mg Intravenous Q12H    mupirocin   Nasal BID    polyethylene glycol  17 g Per OG tube Daily    senna-docusate  1 tablet Per OG tube BID     PRN Meds:  Current Facility-Administered Medications:     acetaminophen, 650 mg, Per OG tube, Q6H PRN    dextrose 50%, 12.5 g, Intravenous, PRN    glucagon (human recombinant), 1 mg, Intramuscular, PRN    hydrALAZINE, 10 mg, Intravenous, Q4H PRN    insulin aspart U-100, 0-5 Units, Subcutaneous, Q6H PRN    labetalol, 10 mg, Intravenous, Q4H PRN    magnesium oxide, 800 mg, Per OG tube, PRN    magnesium oxide, 800 mg, Per OG tube, PRN    oxyCODONE, 10 mg, Per OG tube, Q4H PRN    oxyCODONE, 5 mg, Per OG tube, Q4H PRN    potassium bicarbonate, 35 mEq, Per OG tube, PRN    potassium bicarbonate, 50 mEq, Per OG tube, PRN    potassium bicarbonate, 60 mEq, Per OG tube, PRN    potassium, sodium phosphates, 2 packet, Per OG tube, PRN    potassium, sodium phosphates, 2 packet, Per OG tube, PRN    potassium, sodium phosphates, 2 packet, Per OG tube, PRN    prochlorperazine, 5 mg, Intravenous, Q6H PRN    promethazine, 25 mg, Intramuscular, Q6H PRN    sodium chloride 0.9%, 10 mL,  "Intravenous, PRN     Review of Systems  Objective:     Weight: 74.8 kg (164 lb 14.5 oz)  Body mass index is 29.21 kg/m².  Vital Signs (Most Recent):  Temp: 98.6 °F (37 °C) (05/09/25 0916)  Pulse: (!) 117 (05/09/25 1001)  Resp: (!) 28 (05/09/25 1055)  BP: 127/65 (05/09/25 1001)  SpO2: (!) 94 % (05/09/25 1001) Vital Signs (24h Range):  Temp:  [98.4 °F (36.9 °C)-100.4 °F (38 °C)] 98.6 °F (37 °C)  Pulse:  [] 117  Resp:  [14-37] 28  SpO2:  [94 %-100 %] 94 %  BP: (105-167)/(59-83) 127/65  Arterial Line BP: ()/() 128/91     Date 05/09/25 0700 - 05/10/25 0659   Shift 4942-0012 [id number] 24 Hour Total   INTAKE   I.V.(mL/kg) 19(0.3)   19(0.3)   IV Piggyback 141.2   141.2   Shift Total(mL/kg) 160.2(2.1)   160.2(2.1)   OUTPUT   Shift Total(mL/kg)       Weight (kg) 74.8 74.8 74.8 74.8              Vent Mode: Spont  Oxygen Concentration (%):  [40] 40  Resp Rate Total:  [12 br/min-35 br/min] 35 br/min  Vt Set:  [0 mL-400 mL] 0 mL  PEEP/CPAP:  [5 cmH20] 5 cmH20  Pressure Support:  [5 cmH20] 5 cmH20  Mean Airway Pressure:  [6.8 cmH20-8.1 cmH20] 8.1 cmH20             Urethral Catheter 05/08/25 0750 Non-latex;Straight-tip;Temperature probe;Silicone 12 Fr. (Active)   Site Assessment Clean;Intact 05/09/25 0901   Collection Container Urimeter 05/09/25 0901   Securement Method secured to top of thigh w/ adhesive device 05/09/25 0901   Catheter Care Performed yes 05/09/25 0901   Reason for Continuing Urinary Catheterization Post operative 05/09/25 0901   CAUTI Prevention Bundle Securement Device in place with 1" slack 05/09/25 0701          Physical Exam         Neurosurgery Physical Exam    [id number]  Intubated, awake  PERRL, difficult to fully assess facial motor function but able to close eye  Folows commands x4 full strength throughout      Significant Labs:  Recent Labs   Lab 05/08/25  0548 05/09/25  0238   GLU 99 155*    142   K 3.9 3.4*    114*   CO2 22* 16*   BUN 12 4*   CREATININE 0.7 0.7 "   CALCIUM 9.4 8.1*   MG  --  2.0     Recent Labs   Lab 05/08/25  0548 05/09/25  0238   WBC 8.70 17.16*   HGB 13.7 11.6*   HCT 40.6 34.6*    238     Recent Labs   Lab 05/08/25  0548 05/09/25  0238   INR 1.0 1.0   APTT 27.9 23.4     Microbiology Results (last 7 days)       ** No results found for the last 168 hours. **          All pertinent labs from the last 24 hours have been reviewed.    Significant Diagnostics:  I have reviewed all pertinent imaging results/findings within the past 24 hours.  I have reviewed and interpreted all pertinent imaging results/findings within the past 24 hours.  Assessment/Plan:     * Acoustic neuroma  Pt is s/p left translab craniotomy for acoustic neuroma resection on 5/8    Plan:  Admitted ICU postop  Q1h neurochecks  SBP<160  WTE  Dex 4q6  Pain control, nausea control  FU postop MRI  PTOT OOB  Monitor facial function  Ildefonso, SCD, SQH today        Frank Hernandez MD  Neurosurgery  [facility] - Neuro Critical Care

## 2025-05-09 NOTE — PT/OT/SLP EVAL
Speech Language Pathology Evaluation  Bedside Swallow    Patient Name:  Bibiana Go   MRN:  [id number]  Admitting Diagnosis: Acoustic neuroma    Recommendations:                 General Recommendations:  Speech language evaluation, Cognitive-linguistic evaluation, and oral motor therapy  Diet recommendations:  Soft & Bite Sized Diet - IDDSI Level 6, Thin liquids - IDDSI Level 0   Aspiration Precautions: 1 bite/sip at a time, Alternating bites/sips, Avoid talking while eating, Check for pocketing/oral residue, HOB to 90 degrees, Meds whole 1 at a time, Monitor for s/s of aspiration, Small bites/sips, and Strict aspiration precautions   General Precautions: Standard, aspiration, fall, hearing impaired  Communication strategies:  hearing loss in left ear    Assessment:     Bibiana Go is a 49 y.o. female with an SLP diagnosis of Dysphagia and Dysarthria.      History:     Past Medical History:   Diagnosis Date    Acoustic neuroma     Asthma     as a child    History of PSVT (paroxysmal supraventricular tachycardia)     no longer needs medication       Past Surgical History:   Procedure Laterality Date     SECTION       SECTION WITH TUBAL LIGATION      CRANIOTOMY, TRANSLABYRINTHINE APPROACH, WITH SURGICAL REMOVAL OF ACOUSTIC NEUROMA Left 2025    Procedure: CRANIOTOMY, TRANSLABYRINTHINE APPROACH, WITH SURGICAL REMOVAL OF ACOUSTIC NEUROMA;  Surgeon: Farzad Mccord DO;  Location: [facility] OR [address] [place] [address];  Service: Neurosurgery;  Laterality: Left;    CRANIOTOMY, TRANSLABYRINTHINE APPROACH, WITH SURGICAL REMOVAL OF ACOUSTIC NEUROMA Left 2025    Procedure: CRANIOTOMY, TRANSLABYRINTHINE APPROACH, WITH SURGICAL REMOVAL OF ACOUSTIC NEUROMA, Fat graft;  Surgeon: Dean Varela MD;  Location: [address];  Service: ENT;  Laterality: Left;  Fat graft    CYSTOSCOPY N/A 2019    Procedure: CYSTOSCOPY;  Surgeon: Francisco Javier Bales MD;  Location: [facility];  Service: OB/GYN;  Laterality: N/A;     "HYSTERECTOMY  07/2019    OOPHORECTOMY  07/2019    one removed w/Hysterectomy    ROBOT-ASSISTED LAPAROSCOPIC HYSTERECTOMY N/A 07/18/2019    Procedure: ROBOTIC HYSTERECTOMY;  Surgeon: Francisco Javier Bales MD;  Location: [facility] OR;  Service: OB/GYN;  Laterality: N/A;    ROBOT-ASSISTED LAPAROSCOPIC OOPHORECTOMY Right 07/18/2019    Procedure: ROBOTIC OOPHORECTOMY;  Surgeon: Francisco Javier Bales MD;  Location: [facility] OR;  Service: OB/GYN;  Laterality: Right;    ROBOT-ASSISTED SALPINGECTOMY Bilateral 07/18/2019    Procedure: ROBOTIC SALPINGECTOMY;  Surgeon: Francisco Javier Bales MD;  Location: [facility] OR;  Service: OB/GYN;  Laterality: Bilateral;    TUBAL LIGATION  2/09     History of Present Illness: 50 y/o F w/ PMH of pSVT (no home medications) and L acoustic neuroma (diagnosed 2019) who presents to [facility] s/p L translabyrinthine approach for resection of L acoustic neuroma with abdominal fat graft. She had difficulty eating and reported a chemical and salty taste that started in November. Recently, her main complaint was cyclic vomiting up to 3 times a day which was associated with headaches. Preoperatively, she denied any facial numbness but noted that the left eye intermittently irritates her, and she had no obvious facial droop and no swallowing issues. During the surgery, she received 1 PRBC and 5L of fluids. The procedure lasted around 17 hours, so the decision was made to leave her intubated with the plans to wean for extubation in the morning. Postop CT head and MRI pending. She is admitted to [facility] for close neurologic monitoring.     Prior Intubation HX:  5/8 - 5/9    Modified Barium Swallow: none on file    Chest X-Rays: 5/9/25: Possible small left effusion.     Prior diet: currently NPO; passed Keys      Subjective     "I'm starving."     Pain/Comfort:  Pain Rating 1: 0/10    Respiratory Status: Room air    Objective:     Oral Musculature Evaluation  Oral Musculature: gross asymmetry present, left weakness  Dentition: present " and adequate  Secretion Management: adequate  Mucosal Quality: adequate  Mandibular Strength and Mobility: impaired  Oral Labial Strength and Mobility: impaired retraction, impaired pursing, impaired seal  Lingual Strength and Mobility: impaired protrusion, impaired strength, impaired left lateral movement  Velar Elevation: WFL  Buccal Strength and Mobility: flaccid  Volitional Cough: adequate  Volitional Swallow: difficult to palpate without PO  Voice Prior to PO Intake: dry, clear    Bedside Swallow Eval:   Consistencies Assessed:  Thin liquids ice x 1, 1/2 tsp x 1, full tsp x 1, cup sips x 2, straw sip attempts  Puree 1/2 tsp x 1, full tsp x 1  Solids 1/8 cracker x 1     Oral Phase:   Mild Anterior loss on left corner of lips for cup sip  Unable to acheive closure/seal around straw to siphon water up straw    Pharyngeal Phase:   Mild decreased hyolaryngeal excursion to palpation    Compensatory Strategies  Placing straw on right side of lips did not facilitate achieving seal and suction    Treatment: Education was provided to pt and family regarding role of SLP, purpose of swallowing assessment, CN VII involvement impacting left side oral-motor function, oral dysphagia, aspiration and its risks, potential for pocketing in left buccal cavity, modified diet options, diet recommendations, strategies to prevent or manage pocketing, medication administration, oral motor exercises, ongoing swallowing assessment and monitoring diet tolerance, and plan to initiate speech/language/cognitive evaluation on next service date.  Understanding was expressed.      Goals:   Multidisciplinary Problems       SLP Goals          Problem: SLP    Goal Priority Disciplines Outcome   SLP Goal     SLP    Description: Speech Language Pathology Goals  Goals expected to be met by 5/16:  1. Pt will tolerate least restrictive diet with adequate oral clearance and without s/s of aspiration.   2. Pt will perform OME's x 5-10 to improve left oral  motor function, strength, and ROM.   3. Pt will participate in formal assessment of speech/language/cognition.                               Plan:     Patient to be seen:  4 x/week   Plan of Care expires:  06/08/25  Plan of Care reviewed with:  patient, family   SLP Follow-Up:  Yes       Discharge recommendations:   (tbd - possible OP or HH needs)     Time Tracking:     SLP Treatment Date:   05/09/25  Speech Start Time:  1332  Speech Stop Time:  1349     Speech Total Time (min):  17 min    Billable Minutes: Eval Swallow and Oral Function 9 and Self Care/Home Management Training 8    05/09/2025

## 2025-05-09 NOTE — PT/OT/SLP EVAL
"Occupational Therapy  Co Evaluation/treatment and Discharge Note    Name: Bibiana Go  MRN: [id number]  Admitting Diagnosis: Acoustic neuroma  Recent Surgery: Procedure(s) (LRB):  CRANIOTOMY, TRANSLABYRINTHINE APPROACH, WITH SURGICAL REMOVAL OF ACOUSTIC NEUROMA (Left)  CRANIOTOMY, TRANSLABYRINTHINE APPROACH, WITH SURGICAL REMOVAL OF ACOUSTIC NEUROMA, Fat graft (Left) 1 Day Post-Op    Recommendations:     Discharge Recommendations: No Therapy Indicated  Discharge Equipment Recommendations: walker, rolling, shower chair  Barriers to discharge:  None    Assessment:     Bibiana Go is a 49 y.o. female with a medical diagnosis of Acoustic neuroma.  Pt with mild instability with mobility though suspected due to prolonged bedrest with pt benefiting from CGA with no LOB observed. Pt with BUE strength/sensation intact. Pt with BIMs results indicating intact cognition. Pt with hypertension with initial supine to seated EOB transition with SBP in 150s with nursing alerted and beginning medication to decrease SBP with SBP returning to <140. At this time, patient is functioning at their prior level of function and does not require further acute OT services.     Plan:     During this hospitalization, patient does not require further acute OT services.  Please re-consult if situation changes.    Plan of Care Reviewed with: patient, family    Subjective     Chief Complaint: "do I feel like this from the anesthesia or the surgery?"   Patient/Family Comments/goals: return home    Occupational Profile:  Living Environment:  Pt lives w/ hsb, 21yo + 15yo kids and 2 black labs in a [facility] w/ no DARELL, she has a WIS w/ built in seat and handheld shower head.  Prior to admission, patients level of function was independent, work-from-home .    Equipment used at home: none.    DME owned (not currently used): none.  Upon discharge, patient will have assistance from family 24/7.    Pain/Comfort:  Pain Rating 1: 0/10    Patients " cultural, spiritual, Faith conflicts given the current situation: no    Objective:     Communicated with: nursing prior to session.  Patient found HOB elevated with blood pressure cuff, pulse ox (continuous), telemetry, peripheral IV, PureWick upon OT entry to room.    General Precautions: Standard, fall  Orthopedic Precautions: N/A  Braces: N/A  Respiratory Status: Room air     Occupational Performance:    Bed Mobility:    Patient completed Scooting/Bridging with contact guard assistance  Patient completed Supine to Sit with contact guard assistance    Functional Mobility/Transfers:  Patient completed Sit <> Stand Transfer with contact guard assistance  with  no assistive device   Patient completed Bed <> Chair Transfer using Step Transfer technique with contact guard assistance with no assistive device  Patient completed Toilet Transfer Step Transfer technique with contact guard assistance with  no AD  Functional Mobility: Pt engaged in functional mobility to simulate household/community distances in room with CGA and no AD in order to maximize functional endurance and standing balance required for engagement in occupations of choice - pt with no LOB observed     Activities of Daily Living:  Grooming: contact guard assistance for hand washing at sink  Upper Body Dressing: moderate assistance to francia gown as robe 2/2 medical lines  Lower Body Dressing: maximal assistance to prevent provoking nausea    Toileting: contact guard assistance for pericare and clothing management    Cognitive/Visual Perceptual:  Cognitive/Psychosocial Skills:     -       Oriented to: Person, Place, Time, and Situation   -       Follows Commands/attention:Follows two-step commands  -       Communication: mild dysarthria  -       Memory: mild impaired LTM with home set up recall, intact STM  -       Safety awareness/insight to disability: mildly impaired   -       Mood/Affect/Coping skills/emotional control: Pleasant  Visual/Perceptual:  "     -mild difficulty with tracking to R with R eye  though pt reporting no visual changes    Physical Exam:  Balance:    -       intact  Sensation:    -       Intact  Upper Extremity Range of Motion:     -       Right Upper Extremity: WNL  -       Left Upper Extremity: WNL  Upper Extremity Strength:    -       Right Upper Extremity: WNL  -       Left Upper Extremity: WNL   Strength:    -       Right Upper Extremity: WNL  -       Left Upper Extremity: WNL  Fine Motor Coordination:    -       Intact  Gross motor coordination:   WFL    Brief Interview of Mental Status (BIMS):   Repetition of 3 words: "Sock, Blue, Bed": 3/3  3- Three  Temporal Orientation: 6/6         Able to report correct year: 3- Correct  Able to report correct month: 2- Accurate within 5 days   Able to report correct day of the week: 1- Correct  Recall: 6/6  Able to recall "Sock": 2- Yes, no cue required  Able to recall "Blue": 2- Yes, no cue required   Able to recall "Bed": 2- Yes, no cue required  Summary score: 15/15  13-15: Cognitively Intact  8-12: Moderately Impaired   0-7: Severe Impairment       AMPA 6 Click ADL:  AMPAC Total Score:      Treatment & Education:  Session this date targeted initial OT evaluation and self-care activities to increase pt's independence  Pt educated on OT roles, POC, call button for assistance, importance of staff assistance with OOB activity  Pt and family educated on d/c recommendations, activity pacing,  and environmental modifications to promote safety: assistance with shower transfers and shower chair use  with pt and family verbalizing understanding of all education    Patient left up in chair with all lines intact, call button in reach, nursing notified, and family present    GOALS:   Multidisciplinary Problems       Occupational Therapy Goals       Not on file              Multidisciplinary Problems (Resolved)          Problem: Occupational Therapy    Goal Priority Disciplines Outcome Interventions "   Occupational Therapy Goal   (Resolved)     OT, PT/OT Met    Description: Goals to be met by: 25     Patient will increase functional independence with ADLs by performing:    UE Dressing with Moderate Assistance.  Grooming while standing at sink with Moderate Assistance.  Toileting from toilet with Minimal Assistance for hygiene and clothing management.   Supine to sit with Minimal Assistance.  Toilet transfer to toilet with Minimal Assistance.                         DME Justifications:   Bibiana's mobility limitation cannot be sufficiently resolved by the use of a cane. Her functional mobility deficit can be sufficiently resolved with the use of a Rolling Walker. Patient's mobility limitation significantly impairs their ability to participate in one of more activities of daily living.  The use of a RW will significantly improve the patient's ability to participate in MRADLS and the patient will use it on regular basis in the home.    History:     Past Medical History:   Diagnosis Date    Acoustic neuroma     Asthma     as a child    History of PSVT (paroxysmal supraventricular tachycardia)     no longer needs medication         Past Surgical History:   Procedure Laterality Date     SECTION       SECTION WITH TUBAL LIGATION      CRANIOTOMY, TRANSLABYRINTHINE APPROACH, WITH SURGICAL REMOVAL OF ACOUSTIC NEUROMA Left 2025    Procedure: CRANIOTOMY, TRANSLABYRINTHINE APPROACH, WITH SURGICAL REMOVAL OF ACOUSTIC NEUROMA;  Surgeon: Farzad Mccord DO;  Location: [facility] OR [address] [place] [address];  Service: Neurosurgery;  Laterality: Left;    CRANIOTOMY, TRANSLABYRINTHINE APPROACH, WITH SURGICAL REMOVAL OF ACOUSTIC NEUROMA Left 2025    Procedure: CRANIOTOMY, TRANSLABYRINTHINE APPROACH, WITH SURGICAL REMOVAL OF ACOUSTIC NEUROMA, Fat graft;  Surgeon: Dean Varela MD;  Location: [facility] OR [address] [place] [address];  Service: ENT;  Laterality: Left;  Fat graft    CYSTOSCOPY N/A 2019    Procedure: CYSTOSCOPY;  Surgeon: Francisco Javier PARKER  MD Nii;  Location: [facility];  Service: OB/GYN;  Laterality: N/A;    HYSTERECTOMY  07/2019    OOPHORECTOMY  07/2019    one removed w/Hysterectomy    ROBOT-ASSISTED LAPAROSCOPIC HYSTERECTOMY N/A 07/18/2019    Procedure: ROBOTIC HYSTERECTOMY;  Surgeon: Francisco Javier Bales MD;  Location: [facility];  Service: OB/GYN;  Laterality: N/A;    ROBOT-ASSISTED LAPAROSCOPIC OOPHORECTOMY Right 07/18/2019    Procedure: ROBOTIC OOPHORECTOMY;  Surgeon: Francisco Javier Bales MD;  Location: [facility];  Service: OB/GYN;  Laterality: Right;    ROBOT-ASSISTED SALPINGECTOMY Bilateral 07/18/2019    Procedure: ROBOTIC SALPINGECTOMY;  Surgeon: Francicso Javier Bales MD;  Location: [facility];  Service: OB/GYN;  Laterality: Bilateral;    TUBAL LIGATION  2/09       Time Tracking:     OT Date of Treatment: 05/09/25  OT Start Time: 1255  OT Stop Time: 1328  OT Total Time (min): 33 min    Billable Minutes:Evaluation 8  Self Care/Home Management 25 5/9/2025

## 2025-05-09 NOTE — EICU
Intervention Initiated From:  COR / ERANU    Satish intervened regarding:  Rounding (Video assessment)    VICU Night Rounds Checklist  24H Vital Sign Range:  Temp:  [98.2 °F (36.8 °C)]   Pulse:  [103-105]   Resp:  [18]   BP: (160)/(90)   SpO2:  [98 %-100 %]     Video rounds and LDA reconciliation

## 2025-05-09 NOTE — NURSING
Admitted 5/8/2025 for Crani , translabyrinthine approach, surgical removal acoustic neuroma    Dx: Acoustic neuroma     Skin: intact    Notified: JORGE Hutson    No belongings transported with patient

## 2025-05-09 NOTE — OP NOTE
[facility] Critical [facility]  Surgery Department  Operative Note    SUMMARY     Date of Procedure: 5/8/2025     Procedure:   Craniotomy through transtemporal approach for removal cerebellopontine angle tumor (Translabyrinthine craniotomy, with transapical type I extension)  Use of operating microscope   Abdominal fat graft  Continuous intraoperative neurophysiology monitoring in the room, one-on-one requiring personal attendance         Surgeons and Role:  Panel 1:     * Farzad Mccord DO - Neurosurgeon (Co-surgeon)     * Frank Hernandez MD - Resident - Assisting  Panel 2:     * Dean Varela MD - Neurotologic Surgeon ( Co-surgeon     * Behzad Kim MD - Resident - Assisting        Pre-Operative Diagnosis: 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  Patient had significant brainstem compression and total loss of hearing of the left ear preoperatively.  It was elected to undergo translabyrinthine excision due to brainstem compression.      Modifier 22 justification:   Tumor was of giant size (4 cm) requiring extra operative time and advanced techniques.  This include extension of the translabyrinthine approach with trans apical drilling of the petrous apex.    Description of Technical Procedures:     The patient was taken to the operating room, placed under general anesthetic and intubated without difficulty. The [facility] facial nerve monitoring electrodes were positioned and  monitoring was performed throughout the procedure.   Additionally neuro monitoring was performed by technician who was present for the entire procedure.  This included monitoring of cranial nerves 5, 9, 10, 11, and motor potentials.  Feedback on monitoring was given throughout the case by the technician.  The patient was positioned in the supine position on the OR table and the head was turned to the left.  A Rodney catheter and arterial line were placed.     The left ear and post auricular area were shaved, and sterilely prepped.  An incision was drawn starting 3cm above the post auricular crease and 4-5 cm posterior to the crease.  The area was injected with 1% lidocaine with epinephrine.  Incision was made with a 15 blade and a anterior based soft tissue flap was elevated anteriorly and retracted using skin hooks.  The periosteum was then incised in a T-shaped fashion.  The mucoperosteal flaps were elevated with an elevator and secured to the skin with multiple sutures.      Using a large cutting jessy an extended mastoidectomy was performed.  The middle carri dura, sigmoid sinus, and posterior fossa dura 2cm behind the sigmoid sinus were skeletonized.  The mastoid air cells were opened identifying the antrum, lateral semicircular canal and the incus and removing all air cells of the posterior canal wall. The digastric ridge was identified and the retrofacial air cells were opened.  The remaining bone off the posterior and middle fossa dura, and sigmoid sinus were removed with clyde burrs.     Labyrinthectomy was then performed using cutting and clyde burrs.  The horizontal semicircular canal was followed to the posterior canal and the the posterior canal was followed the common octaviano.  The canals were drilled widely and the vestibule was open while maintaining the landmarks of the ampulla of the canals.  The endolymphatic duct was identified and followed to the posterior fossa dura where it was sharply transect.       The Jugular bulb was then identified inferiorly and we began working on identified the internal auditory canal.  The canal was identified at its midportion and then inferior and superior troughs were drilled above and below the canal until 270 degrees of exposure were identified.  The cochlear aqueduct was identified inferiorly.  Additionally a transapical extension was performed in which 320° of exposure of the internal auditory canal was achieved.  This required extra time drilling further around the IAC.  The remaining bone was removed off the posterior side of the internal auditory canal.  The superior and inferior vestibular nerves were identified and the IAC dura was incised.  The facial nerve was identified running behind the superior vestibular nerve.        An abdominal fat graft was harvested by making an 4 cm incision in the abdomen the fat was harvested using sharp dissection being careful not to disrupt the abdominal fascia.  The fat was removed and cut into strips and placed in saline.  The wound was then thoroughly cleaned and closed in a simple interrupted fashion.  A penrose drain was placed in the incision.      At this point in the case, Dr. Mccord and his team performed the resection of the tumor from the internal auditory canal and cerebellopontine angle with my support as needed.  Details to be dictated in a separate operative note.  In short the interior of the tumor was opened in the inside was gutted using a Sonopet aspirator.  This allowed infolding of the capsule.  Slowly and very carefully the capsule was resected using constant neuro monitoring to confirm nerve stimulation.  Initially we were getting stimulation of the facial nerve along the backside of the tumor but had difficulty visualizing the nerve due to the bulk of the tumor.  Towards the end of dissection were unable to get stimulation along the backside of the tumor and at the brainstem.  The remaining tumor was thinned as  much as possible, but this was made difficult due to lack of response on the facial nerve monitor.  It was decided to leave a small residual along the facial nerve from the poor us to brainstem area.  This was shaved down to a small was residual as possible.  After completion of tumor removal hemostasis was carefully achieved within the cerebellopontine angle by Dr. Mccord.    Then reconstruction of the craniotomy defect proceeded.  A sling of Surgicel was placed across the dural opening and then abdominal fat was layered into the mastoid defect up to the level of the antrum.  A fascia graft was used to completely cover the antrum area.  Then bone cement filled the entire craniotomy defect up to the level of the external cranium.  This was allowed to harden.  The periosteum was then closed with Vicryl sutures.  The deep dermis was closed with inverted interrupted Vicryl.  The skin was closed with a running nylon suture.  A compression dressing was placed over the site.     The patient was kept intubated and brought to the neuro ICU in a stable condition.          Estimated Blood Loss (EBL): 150 mL           Implants:   Implant Name Type Inv. Item Serial No.  Lot No. LRB No. Used Action   CEMENT HYDROSET INJ 10CC - [id number]  CEMENT HYDROSET INJ 10CC  [employer] SID. [id number] Left 1 Implanted   CEMENT HYDROSET INJ 10CC - [id number]  CEMENT HYDROSET INJ 10CC  [employer] SID. [id number] Left 1 Implanted       Specimens:   Specimen (24h ago, onward)       Start     Ordered    05/08/25 2329  Specimen to Pathology Neurosurgery  RELEASE UPON ORDERING        References:    Click here for ordering Quick Tip   Question:  Release to patient  Answer:  Immediate    05/08/25 3993                   ID Type Source Tests Collected by Time Destination   1 : 1. Left CP Angle Tumor - Permanent Tissue Brain SPECIMEN TO PATHOLOGY Farzad Mccord,  5/8/2025 2114               Condition: Stable    Disposition: ICU -  intubated and hemodynamically stable.    Attestation: Op Note Attestation: I was physically present during the entire procedure.

## 2025-05-09 NOTE — ASSESSMENT & PLAN NOTE
Left intubated after OR  Plans to wean ventilator tomorrow  ABG and CXR pending  VAP/PUD PPX  Vent Mode: A/C  Oxygen Concentration (%):  [40] 40  Resp Rate Total:  [15 br/min] 15 br/min  Vt Set:  [400 mL] 400 mL  PEEP/CPAP:  [5 cmH20] 5 cmH20  Mean Airway Pressure:  [7.9 cmH20] 7.9 cmH20

## 2025-05-09 NOTE — PLAN OF CARE
Problem: Occupational Therapy  Goal: Occupational Therapy Goal  Description: Goals to be met by: 5/9/25     Patient will increase functional independence with ADLs by performing:    UE Dressing with Moderate Assistance.  Grooming while standing at sink with Moderate Assistance.  Toileting from toilet with Minimal Assistance for hygiene and clothing management.   Supine to sit with Minimal Assistance.  Toilet transfer to toilet with Minimal Assistance.    Outcome: Met

## 2025-05-09 NOTE — PLAN OF CARE
"[facility] Care Plan    POC reviewed with Bibiana Go and family at 0300. Patient and Family verbalized understanding. Questions and concerns addressed. No acute events today. Pt progressing toward goals. Will continue to monitor. See below and flowsheets for full assessment and VS info.     Fentanyl gtt @ 87.5   NS 1000 L bolus  Cardene stopped   Plan to extubate         Is this a stroke patient? NO    Neuro:  [place] Coma Scale  Best Eye Response: 4-->(E4) spontaneous  Best Motor Response: 6-->(M6) obeys commands  Best Verbal Response: 1-->(V1) none  [place] Coma Scale Score: 11  Pupil PERRLA: yes     24 hr Temp:  [99.5 °F (37.5 °C)-100.4 °F (38 °C)]     CV:   Rhythm: sinus tachycardia  BP goals:   SBP < 140  MAP > 85    Resp:      Vent Mode: A/C  Set Rate: 14 BPM  Oxygen Concentration (%): 40  Vt Set: 400 mL  PEEP/CPAP: 5 cmH20    Plan: wean to extubate     GI/:     Diet/Nutrition Received: NPO  Last Bowel Movement: 05/07/25  Voiding Characteristics: urethral catheter (bladder)    Intake/Output Summary (Last 24 hours) at 5/9/2025 0707  Last data filed at 5/9/2025 0600  Gross per 24 hour   Intake 5797.47 ml   Output 4000 ml   Net 1797.47 ml          Labs/Accuchecks:  Recent Labs   Lab 05/09/25  0238   WBC 17.16*   RBC 3.71*   HGB 11.6*   HCT 34.6*         Recent Labs   Lab 05/09/25  0238      K 3.4*   CO2 16*   *   BUN 4*   CREATININE 0.7   ALKPHOS 51   ALT 7*   AST 12   BILITOT 0.3      Recent Labs   Lab 05/09/25  0238   PROTIME 10.6   INR 1.0   APTT 23.4    No results for input(s): "CPK", "CPKMB", "TROPONINI", "MB" in the last 168 hours.    Electrolytes: yes  Accuchecks: No    Gtts:   fentanyl  0-250 mcg/hr Intravenous Continuous 6.3 mL/hr at 05/09/25 0600 62.5 mcg/hr at 05/09/25 0600    nicardipine  0-15 mg/hr Intravenous Continuous   Stopped at 05/09/25 0419    propofoL  0-50 mcg/kg/min Intravenous Continuous 9 mL/hr at 05/09/25 0600 20 mcg/kg/min at 05/09/25 0600       LDA/Wounds:    Joshua " Risk Assessment  Sensory Perception: 3-->slightly limited  Moisture: 4-->rarely moist  Activity: 1-->bedfast  Mobility: 3-->slightly limited  Nutrition: 2-->probably inadequate  Friction and Shear: 2-->potential problem  Joshua Score: 15  Is your joshua score 12 or less? No           Restraints:   Restraint Order  Length of Order: Order good for next 24 hours or when removed.  Date that the current order will : 05/10/25  Time that the current order will : 0458  Order Upon Application: Yes    [facility]

## 2025-05-09 NOTE — RESPIRATORY THERAPY
Respiratory mechanics performed on patient @ 0836.  NIF -30;  ml.  Patient has an audible cuff leak present.  Will continue to monitor.

## 2025-05-09 NOTE — TRANSFER OF CARE
"Anesthesia Transfer of Care Note    Patient: Bibiana Go    Procedure(s) Performed: Procedure(s) (LRB):  CRANIOTOMY, TRANSLABYRINTHINE APPROACH, WITH SURGICAL REMOVAL OF ACOUSTIC NEUROMA (Left)  CRANIOTOMY, TRANSLABYRINTHINE APPROACH, WITH SURGICAL REMOVAL OF ACOUSTIC NEUROMA, Fat graft (Left)    Patient location: ICU    Anesthesia Type: general    Transport from OR: Transported from OR intubated on 100% O2 by AMBU with assisted ventilation. Continuous ECG monitoring in transport. Continuous SpO2 monitoring in transport. Continuos invasive BP monitoring in transport    Post pain: adequate analgesia    Post assessment: no apparent anesthetic complications and tolerated procedure well    Post vital signs: stable    Level of consciousness: awake and alert    Nausea/Vomiting: no nausea/vomiting    Complications: none    Transfer of care protocol was followed      Last vitals: Visit Vitals  BP (!) 160/90 (BP Location: Right arm, Patient Position: Lying)   Pulse 105   Temp 36.8 °C (98.2 °F) (Temporal)   Resp 18   Ht 5' 3" (1.6 m)   Wt 74.8 kg (164 lb 14.5 oz)   LMP 06/18/2019 (Exact Date)   SpO2 100%   Breastfeeding No   BMI 29.21 kg/m²     "

## 2025-05-09 NOTE — RESPIRATORY THERAPY
Extubated patient per MD order.  Patient is on room air; sats are 100%.  Will continue to monitor.

## 2025-05-09 NOTE — HPI
50 y/o F w/ PMH of pSVT (no home medications) and L acoustic neuroma (diagnosed 2019) who presents to [facility] s/p L translabyrinthine approach for resection of L acoustic neuroma with abdominal fat graft. She had difficulty eating and reported a chemical and salty taste that started in November. Recently, her main complaint was cyclic vomiting up to 3 times a day which was associated with headaches. Preoperatively, she denied any facial numbness but noted that the left eye intermittently irritates her, and she had no obvious facial droop and no swallowing issues. During the surgery, she received 1 PRBC and 5L of fluids. The procedure lasted around 17 hours, so the decision was made to leave her intubated with the plans to wean for extubation in the morning. Postop CT head and MRI pending. Postop, she is admitted to [facility] for close neurologic monitoring.

## 2025-05-09 NOTE — BRIEF OP NOTE
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         Specimens:   Specimen (24h ago, onward)      None          * No specimens in log *    [id number]

## 2025-05-09 NOTE — H&P
Yakov Radha - Neuro Critical Care  Neurocritical Care  History & Physical    Admit Date: 2025  Service Date: 2025  Length of Stay: 1    Subjective:     Chief Complaint: Acoustic neuroma    History of Present Illness: 50 y/o F w/ PMH of pSVT (no home medications) and L acoustic neuroma (diagnosed ) who presents to [facility] s/p L translabyrinthine approach for resection of L acoustic neuroma with abdominal fat graft. She had difficulty eating and reported a chemical and salty taste that started in November. Recently, her main complaint was cyclic vomiting up to 3 times a day which was associated with headaches. Preoperatively, she denied any facial numbness but noted that the left eye intermittently irritates her, and she had no obvious facial droop and no swallowing issues. During the surgery, she received 1 PRBC and 5L of fluids. The procedure lasted around 17 hours, so the decision was made to leave her intubated with the plans to wean for extubation in the morning. Postop CT head and MRI pending. She is admitted to [facility] for close neurologic monitoring.       Past Medical History:   Diagnosis Date    Acoustic neuroma     Asthma     as a child    History of PSVT (paroxysmal supraventricular tachycardia)     no longer needs medication     Past Surgical History:   Procedure Laterality Date     SECTION       SECTION WITH TUBAL LIGATION      CYSTOSCOPY N/A 2019    Procedure: CYSTOSCOPY;  Surgeon: Francisco Javier Bales MD;  Location: [facility];  Service: OB/GYN;  Laterality: N/A;    HYSTERECTOMY  2019    OOPHORECTOMY  2019    one removed w/Hysterectomy    ROBOT-ASSISTED LAPAROSCOPIC HYSTERECTOMY N/A 2019    Procedure: ROBOTIC HYSTERECTOMY;  Surgeon: Francisco Javier Bales MD;  Location: [facility] OR;  Service: OB/GYN;  Laterality: N/A;    ROBOT-ASSISTED LAPAROSCOPIC OOPHORECTOMY Right 2019    Procedure: ROBOTIC OOPHORECTOMY;  Surgeon: Francisco Javier Bales MD;  Location: [facility] OR;  Service:  OB/GYN;  Laterality: Right;    ROBOT-ASSISTED SALPINGECTOMY Bilateral 07/18/2019    Procedure: ROBOTIC SALPINGECTOMY;  Surgeon: Francisco Javier Bales MD;  Location: [facility];  Service: OB/GYN;  Laterality: Bilateral;    TUBAL LIGATION  2/09      Medications Ordered Prior to Encounter[1]   Allergies: Zofran [ondansetron hcl]  Family History   Problem Relation Name Age of Onset    Hyperlipidemia Mother      Kidney disease Father Dad     Cancer Father Dad     Diabetes Father Dad     Heart disease Father Dad     Breast cancer Maternal Aunt  45    Breast cancer Maternal Aunt Aunt Sheeba     Breast cancer Paternal Aunt  42    Breast cancer Paternal Aunt Aunt Rosario      Social History[2]  Review of Systems   Reason unable to perform ROS: Intubated and sedated.     Objective:     Vitals:    SpO2: 100 %  Oxygen Concentration (%): 40  Vent Mode: A/C  Set Rate: 14 BPM  Vt Set: 400 mL  PEEP/CPAP: 5 cmH20  Peak Airway Pressure: 22 cmH20  Mean Airway Pressure: 7.9 cmH20  Plateau Pressure: 0 cmH20    Temp  Min: 98.2 °F (36.8 °C)  Max: 98.2 °F (36.8 °C)  Pulse  Min: 103  Max: 105  BP  Min: 160/90  Max: 160/90  MAP (mmHg)  Min: 117  Max: 117  Resp  Min: 18  Max: 18  SpO2  Min: 98 %  Max: 100 %  Oxygen Concentration (%)  Min: 40  Max: 40    05/08 0701 - 05/09 0700  In: 5285 [I.V.:4000]  Out: 3775 [Urine:3625]            Physical Exam  Vitals and nursing note reviewed.   Constitutional:       General: She is not in acute distress.     Comments: Sedated. Resting comfortably in bed on mechanical ventilation.   HENT:      Ears:      Comments: L ear cover in place     Nose: Nose normal.      Mouth/Throat:      Mouth: Mucous membranes are moist.      Pharynx: Oropharynx is clear.   Eyes:      Pupils: Pupils are equal, round, and reactive to light.   Cardiovascular:      Rate and Rhythm: Normal rate and regular rhythm.      Pulses: Normal pulses.   Pulmonary:      Effort: Pulmonary effort is normal.      Comments: Orally intubated on mechanical  ventilation  Abdominal:      General: Abdomen is flat. There is no distension.      Palpations: Abdomen is soft.      Tenderness: There is no abdominal tenderness.   Musculoskeletal:         General: No swelling.      Cervical back: Neck supple.   Skin:     General: Skin is warm and dry.      Capillary Refill: Capillary refill takes less than 2 seconds.   Neurological:      Comments:   Exam was complete postop while patient was intubated and sedated  E1 V1T M5  PERRLA.  No obvious facial asymmetry.  Motor:  RUE 3/5  RLE 2/5  LUE 3/5  LLE 2/5  Sensation intact and symmetric throughout       Unable to test orientation, language, memory, judgment, insight, fund of knowledge, hearing, shoulder shrug, tongue protrusion, coordination, gait due to level of consciousness.       Today I personally reviewed pertinent medications, lines/drains/airways, imaging, cardiology results, laboratory results, microbiology results    Assessment/Plan:     ENT  * Acoustic neuroma  50 y/o F w/ PMH of pSVT (no home medications, resolved many years ago) and L acoustic neuroma (diagnosed 2019) who presents to [facility] s/p L translabyrinthine approach for resection of L acoustic neuroma on 5/8 with NSGY and ENT.    Admit to [facility]  Q1h neuro checks, I&Os, and vitals   CBC, CMP, mag, phos daily   PT/INR/PTT/LA pending  Postop CT head without contrast and MRI with and without contrast pending  Dex 4 Q6   H2B  Ancef  NSGY following   SBP <140  Prn labetalol, hydralazine   Cardene, wean as tolerated  PRN pain/nausea regimen   NPO  SCDs; hold chemical VTE ppx in acute setting   PT/OT    Pulmonary  On mechanically assisted ventilation  Left intubated after OR  Plans to wean ventilator tomorrow  ABG and CXR pending  VAP/PUD PPX  Vent Mode: A/C  Oxygen Concentration (%):  [40] 40  Resp Rate Total:  [15 br/min] 15 br/min  Vt Set:  [400 mL] 400 mL  PEEP/CPAP:  [5 cmH20] 5 cmH20  Mean Airway Pressure:  [7.9 cmH20] 7.9 cmH20          The patient is being  Prophylaxed for:  Venous Thromboembolism with: Mechanical  Stress Ulcer with: H2B  Ventilator Pneumonia with: chlorhexidine oral care    Activity Orders            Elevate HOB 30 starting at 05/09 0106    Turn patient starting at 05/09 0000          Full Code    Critical care time spent on the evaluation and treatment of severe organ dysfunction, review of pertinent labs and imaging studies, discussions with consulting providers and discussions with patient/family: 65 minutes.    Piero Allison, [facility]  Neurocritical Care  Yakov Garcia - Neuro Critical Care       [1]   No current facility-administered medications on file prior to encounter.     No current outpatient medications on file prior to encounter.   [2]   Social History  Tobacco Use    Smoking status: Never    Smokeless tobacco: Never   Substance Use Topics    Alcohol use: Yes     Alcohol/week: 1.0 standard drink of alcohol     Types: 1 Drinks containing 0.5 oz of alcohol per week     Comment: One drink about once a month    Drug use: Never

## 2025-05-09 NOTE — SUBJECTIVE & OBJECTIVE
Interval History: 5/9 POD1 tolerated procedure well, monitored in ICU overnight intubated. CTH with expected changes. Plan to extubate today, MRI pending    Medications:  Continuous Infusions:   nicardipine  0-15 mg/hr Intravenous Continuous 12.5 mL/hr at 05/09/25 0812 2.5 mg/hr at 05/09/25 0812     Scheduled Meds:   dexAMETHasone injection  4 mg Intravenous Q6H    famotidine (PF)  20 mg Intravenous Q12H    mupirocin   Nasal BID    polyethylene glycol  17 g Per OG tube Daily    senna-docusate  1 tablet Per OG tube BID     PRN Meds:  Current Facility-Administered Medications:     acetaminophen, 650 mg, Per OG tube, Q6H PRN    dextrose 50%, 12.5 g, Intravenous, PRN    glucagon (human recombinant), 1 mg, Intramuscular, PRN    hydrALAZINE, 10 mg, Intravenous, Q4H PRN    insulin aspart U-100, 0-5 Units, Subcutaneous, Q6H PRN    labetalol, 10 mg, Intravenous, Q4H PRN    magnesium oxide, 800 mg, Per OG tube, PRN    magnesium oxide, 800 mg, Per OG tube, PRN    oxyCODONE, 10 mg, Per OG tube, Q4H PRN    oxyCODONE, 5 mg, Per OG tube, Q4H PRN    potassium bicarbonate, 35 mEq, Per OG tube, PRN    potassium bicarbonate, 50 mEq, Per OG tube, PRN    potassium bicarbonate, 60 mEq, Per OG tube, PRN    potassium, sodium phosphates, 2 packet, Per OG tube, PRN    potassium, sodium phosphates, 2 packet, Per OG tube, PRN    potassium, sodium phosphates, 2 packet, Per OG tube, PRN    prochlorperazine, 5 mg, Intravenous, Q6H PRN    promethazine, 25 mg, Intramuscular, Q6H PRN    sodium chloride 0.9%, 10 mL, Intravenous, PRN     Review of Systems  Objective:     Weight: 74.8 kg (164 lb 14.5 oz)  Body mass index is 29.21 kg/m².  Vital Signs (Most Recent):  Temp: 98.6 °F (37 °C) (05/09/25 0916)  Pulse: (!) 117 (05/09/25 1001)  Resp: (!) 28 (05/09/25 1055)  BP: 127/65 (05/09/25 1001)  SpO2: (!) 94 % (05/09/25 1001) Vital Signs (24h Range):  Temp:  [98.4 °F (36.9 °C)-100.4 °F (38 °C)] 98.6 °F (37 °C)  Pulse:  [] 117  Resp:  [14-37]  "28  SpO2:  [94 %-100 %] 94 %  BP: (105-167)/(59-83) 127/65  Arterial Line BP: ()/() 128/91     Date 05/09/25 0700 - 05/10/25 0659   Shift 2873-1588 5528-5472 8246-2949 24 Hour Total   INTAKE   I.V.(mL/kg) 19(0.3)   19(0.3)   IV Piggyback 141.2   141.2   Shift Total(mL/kg) 160.2(2.1)   160.2(2.1)   OUTPUT   Shift Total(mL/kg)       Weight (kg) 74.8 74.8 74.8 74.8              Vent Mode: Spont  Oxygen Concentration (%):  [40] 40  Resp Rate Total:  [12 br/min-35 br/min] 35 br/min  Vt Set:  [0 mL-400 mL] 0 mL  PEEP/CPAP:  [5 cmH20] 5 cmH20  Pressure Support:  [5 cmH20] 5 cmH20  Mean Airway Pressure:  [6.8 cmH20-8.1 cmH20] 8.1 cmH20             Urethral Catheter 05/08/25 0753 Non-latex;Straight-tip;Temperature probe;Silicone 12 Fr. (Active)   Site Assessment Clean;Intact 05/09/25 0901   Collection Container Urimeter 05/09/25 0901   Securement Method secured to top of thigh w/ adhesive device 05/09/25 0901   Catheter Care Performed yes 05/09/25 0901   Reason for Continuing Urinary Catheterization Post operative 05/09/25 0901   CAUTI Prevention Bundle Securement Device in place with 1" slack 05/09/25 0701          Physical Exam         Neurosurgery Physical Exam    [id number]  Intubated, awake  PERRL, difficult to fully assess facial motor function but able to close eye  Folows commands x4 full strength throughout      Significant Labs:  Recent Labs   Lab 05/08/25  0548 05/09/25  0238   GLU 99 155*    142   K 3.9 3.4*    114*   CO2 22* 16*   BUN 12 4*   CREATININE 0.7 0.7   CALCIUM 9.4 8.1*   MG  --  2.0     Recent Labs   Lab 05/08/25  0548 05/09/25  0238   WBC 8.70 17.16*   HGB 13.7 11.6*   HCT 40.6 34.6*    238     Recent Labs   Lab 05/08/25  0548 05/09/25  0238   INR 1.0 1.0   APTT 27.9 23.4     Microbiology Results (last 7 days)       ** No results found for the last 168 hours. **          All pertinent labs from the last 24 hours have been reviewed.    Significant Diagnostics:  I have " reviewed all pertinent imaging results/findings within the past 24 hours.  I have reviewed and interpreted all pertinent imaging results/findings within the past 24 hours.

## 2025-05-09 NOTE — SUBJECTIVE & OBJECTIVE
Past Medical History:   Diagnosis Date    Acoustic neuroma     Asthma     as a child    History of PSVT (paroxysmal supraventricular tachycardia)     no longer needs medication     Past Surgical History:   Procedure Laterality Date     SECTION       SECTION WITH TUBAL LIGATION      CYSTOSCOPY N/A 2019    Procedure: CYSTOSCOPY;  Surgeon: Francisco Javier Bales MD;  Location: [facility];  Service: OB/GYN;  Laterality: N/A;    HYSTERECTOMY  2019    OOPHORECTOMY  2019    one removed w/Hysterectomy    ROBOT-ASSISTED LAPAROSCOPIC HYSTERECTOMY N/A 2019    Procedure: ROBOTIC HYSTERECTOMY;  Surgeon: Francisco Javier Bales MD;  Location: [facility] OR;  Service: OB/GYN;  Laterality: N/A;    ROBOT-ASSISTED LAPAROSCOPIC OOPHORECTOMY Right 2019    Procedure: ROBOTIC OOPHORECTOMY;  Surgeon: Francisco Javier Bales MD;  Location: [facility] OR;  Service: OB/GYN;  Laterality: Right;    ROBOT-ASSISTED SALPINGECTOMY Bilateral 2019    Procedure: ROBOTIC SALPINGECTOMY;  Surgeon: Francisco Javier Bales MD;  Location: [facility] OR;  Service: OB/GYN;  Laterality: Bilateral;    TUBAL LIGATION        Medications Ordered Prior to Encounter[1]   Allergies: Zofran [ondansetron hcl]  Family History   Problem Relation Name Age of Onset    Hyperlipidemia Mother      Kidney disease Father Dad     Cancer Father Dad     Diabetes Father Dad     Heart disease Father Dad     Breast cancer Maternal Aunt  45    Breast cancer Maternal Aunt Aunt Sheeba     Breast cancer Paternal Aunt  42    Breast cancer Paternal Aunt Aunt Rosario      Social History[2]  Review of Systems   Reason unable to perform ROS: Intubated and sedated.     Objective:     Vitals:    SpO2: 100 %  Oxygen Concentration (%): 40  Vent Mode: A/C  Set Rate: 14 BPM  Vt Set: 400 mL  PEEP/CPAP: 5 cmH20  Peak Airway Pressure: 22 cmH20  Mean Airway Pressure: 7.9 cmH20  Plateau Pressure: 0 cmH20    Temp  Min: 98.2 °F (36.8 °C)  Max: 98.2 °F (36.8 °C)  Pulse  Min: 103  Max: 105  BP   Min: 160/90  Max: 160/90  MAP (mmHg)  Min: 117  Max: 117  Resp  Min: 18  Max: 18  SpO2  Min: 98 %  Max: 100 %  Oxygen Concentration (%)  Min: 40  Max: 40    05/08 0701 - 05/09 0700  In: 5285 [I.V.:4000]  Out: 3775 [Urine:3625]            Physical Exam  Vitals and nursing note reviewed.   Constitutional:       General: She is not in acute distress.     Comments: Sedated. Resting comfortably in bed on mechanical ventilation.   HENT:      Ears:      Comments: L ear cover in place     Nose: Nose normal.      Mouth/Throat:      Mouth: Mucous membranes are moist.      Pharynx: Oropharynx is clear.   Eyes:      Pupils: Pupils are equal, round, and reactive to light.   Cardiovascular:      Rate and Rhythm: Normal rate and regular rhythm.      Pulses: Normal pulses.   Pulmonary:      Effort: Pulmonary effort is normal.      Comments: Orally intubated on mechanical ventilation  Abdominal:      General: Abdomen is flat. There is no distension.      Palpations: Abdomen is soft.      Tenderness: There is no abdominal tenderness.   Musculoskeletal:         General: No swelling.      Cervical back: Neck supple.   Skin:     General: Skin is warm and dry.      Capillary Refill: Capillary refill takes less than 2 seconds.   Neurological:      Comments:   Exam was complete postop while patient was intubated and sedated  E1 V1T M5  PERRLA.  No obvious facial asymmetry.  Motor:  RUE 3/5  RLE 2/5  LUE 3/5  LLE 2/5  Sensation intact and symmetric throughout       Unable to test orientation, language, memory, judgment, insight, fund of knowledge, hearing, shoulder shrug, tongue protrusion, coordination, gait due to level of consciousness.       Today I personally reviewed pertinent medications, lines/drains/airways, imaging, cardiology results, laboratory results, microbiology results         [1]   No current facility-administered medications on file prior to encounter.     No current outpatient medications on file prior to encounter.    [2]   Social History  Tobacco Use    Smoking status: Never    Smokeless tobacco: Never   Substance Use Topics    Alcohol use: Yes     Alcohol/week: 1.0 standard drink of alcohol     Types: 1 Drinks containing 0.5 oz of alcohol per week     Comment: One drink about once a month    Drug use: Never

## 2025-05-09 NOTE — ASSESSMENT & PLAN NOTE
48 y/o F w/ PMH of pSVT (no home medications, resolved many years ago) and L acoustic neuroma (diagnosed 2019) who presents to [facility] s/p L translabyrinthine approach for resection of L acoustic neuroma on 5/8 with NSGY and ENT.    Admit to [facility]  Q1h neuro checks, I&Os, and vitals   CBC, CMP, mag, phos daily   PT/INR/PTT/LA pending  Postop CT head without contrast and MRI with and without contrast pending  Dex 4 Q6   H2B  Ancef  NSGY following   SBP <140  Prn labetalol, hydralazine   Cardene, wean as tolerated  PRN pain/nausea regimen   NPO  SCDs; hold chemical VTE ppx in acute setting   PT/OT

## 2025-05-09 NOTE — OP NOTE
DATE: 5/8/25     PREOP DIAGNOSIS:  Giant left vestibular schwannoma  Sensorineuronal hearing loss  Vomiting     POSTOP DIAGNOSIS:  Same as above     OPERATION PERFORMED:  1. Left translabrynthine/transmastoid approach (with transapical type 1 extension) for resection of vestibular schwannoma  2. Use of microscope  3. Use of intraop facial nerve monitoring (NIMS) and continuous neuromonitoring (CN5,7,9,10,11)  4. Abdominal fat graft  5. Cranioplasty with bone cement     PANEL 1:  SURGEON:  Farzad Mccord D.O. (neurosurgery)  ASSISTANT:  Frank Hernandez M.D. (neurosurgery resident)     PANEL 2:  CO-SURGEON:  Dean Varela M.D. (neurootology)  ASSISTANT:  Behzad Kim M.D. (neurootology resident)     Modifier 22 justification:   Tumor was of giant size (4 cm) completely filling the cerebellopontine angle with significant compression of the brainstem and cranial nerves.  This led to substantially longer operative and anesthesia time and required advanced techniques. This include extension of the translabyrinthine approach with trans apical drilling of the petrous apex.    LEVEL OF INVOLVEMENT OF ATTENDING:  Full     INDICATION:  48 yo F with a large left vestibular schwannoma causing significant mass effect on the lateral medulla and cerebellum with distortion of the 4th ventricle.  She presents with hearing loss, loss of taste, headaches, and cyclic vomiting.  Due to the size and mass effect, it was recommended that she undergo surgical resection.  She is scheduled for trans labyrinthine approach with Dr. Varela on May 8.  She is in agreement with this plan.  I reviewed the imaging and the risks, benefits, and alternatives to surgery.  I emphasized the risk for facial weakness, brainstem injury, neurovascular injury, or other cranial nerve injury.     The patient was given all of his options for treatment, including observation, radiation, and surgery.  The patient expressed a clear desire to have surgery.  The case  will be performed in coordination with Dr. Master of Neurootology.     Consents were obtained and risks, benefits, and alternatives to surgery were discussed.     PROCEDURE IN DETAIL:  The patient was correctly identified taken to the operating room where the anesthesia team administered general endotracheal anesthesia.  The patient was kept in the supine position with the head resting on a foam donut and turned to the left.  The patient's post auricular area was clipped free of hair and a C-shaped incision was planned approximately 3 fingerbreadths behind the year.  All pressure points were padded.   The NIMs monitor was attached to monitor facial nerve.  She was also monitored for CN5,7,9-11 by the neuromonitoring tech.  The abdomen was prepared for a fat graft.  The patient was given prophylactic IV antibiotics and Decadron.  The patient was prepped and draped in typical sterile fashion.  The Neuro-otology team performed the translabyrinthine approach (please see their note for specific details).  An abdominal fat was performed and several large pieces of fat were harvested and set aside to fill the mastoid defect. This provided access to the internal auditory canal and tumor within the CP angle. The dura was incised in a Y-shaped fashion exposing the tumor, which filled the IAC and extended into the CP angle and was causing significant compression of the cerebellum and brainstem.  The facial nerve probe was used to confirm that the facial nerve was not overlying the lateral aspect of the tumor.  The tumor capsule was then cauterized with bipolar cautery and then incised with a eleven blade scalpel.  Several specimens were taken and saved for specimen.  Next using the Sonopet with a micro tip the tumor was debulked extensively until the capsule could be better mobilized.  Tumor was removed with a combination of Sonopet and sharp dissection.  The facial nerve probe was used constantly throughout the case to help  identify the facial nerve which was identified at the anterosuperior aspect of the IAC and the anterior superior aspect of the tumor.  Tumor was debulked until the posterior capsule was thin enough to try to elevate from the brainstem however there was a network of shared blood vessels which caused the tumor to be quite adherent to the brainstem and difficult to elevate without causing bleeding.  The facial nerve was identified at the brainstem and stimulated at 0.5 milliamps.  The tumor was thinned out by alternating between Sonopet and bipolar cautery until a smaller portion remain along the course of the facial nerve and at the porous acoustic us and within the IAC.  Next, tumor was debulked and resected from lateral to medial within the IAC exposing the vestibular nerve and facial nerve.  The tumor was dissected medially until the remaining CP angle portion was encountered.  Final debulking was carried out with a Sonopet leaving a thin residual along the facial nerve.  It was at this point that stimulation of the facial nerve stopped just proximal to the porous acoustic us.  We could no longer get stimulation at the brainstem.  Without being able to reliably identify the facial nerve based on stimulation, decision was made to leave a small residual along the facial nerve.  Cranial nerves 9 through 11 as well as 6 were identified and protected.  The area was irrigated with copious amounts of antibiotic solution.  Final hemostasis was obtained with Surgicel and Gelfoam soaked in thrombin. The tumor was sent for permanent specimen.  Next several pieces of fat graft were placed in the defect as well as a temporalis graft.  At here is dural sealant was sprayed over the fat graft and a titanium plate was cut to size and placed over the mastoid defect.  The wound was closed layered fashion 1st with 2 0 Vicryl in the muscle and fascia followed by 2 0 Vicryl in the galea.  The skin was closed with running nylon suture  and a mastoid dressing was applied.     COMPLICATIONS: none     INCISION: Left mastoid     WOUND CLASS: clean     FINDINGS:   Giant vestibular schwannoma filling entire CPA with brainstem compression  Near total resection, with anatomic preservation of facial nerve  Unable to stimulate nerve beyond porus, with no response at brain stem up to 1.0mV  Bone cement cranioplasty with abdominal fat for closure     DRAIN: none     CONDITION: stable

## 2025-05-09 NOTE — PLAN OF CARE
"[facility] Care Plan  POC reviewed with Bibiana Go and family at 1400. Patient and Family verbalized understanding. Questions and concerns addressed. No acute events today. Pt progressing toward goals. Will continue to monitor. See below and flowsheets for full assessment and VS info.   Extubated  Work with PT?OT and speech            Is this a stroke patient? no    Neuro:  [place] Coma Scale  Best Eye Response: 4-->(E4) spontaneous  Best Motor Response: 6-->(M6) obeys commands  Best Verbal Response: 5-->(V5) oriented  Long Coma Scale Score: 15  Pupil PERRLA: yes     24 hr Temp:  [98.3 °F (36.8 °C)-100.4 °F (38 °C)]     CV:   Rhythm: normal sinus rhythm  BP goals:   SBP < 140  MAP > 65    Resp:      Vent Mode: Spont  Set Rate: 0 BPM  Oxygen Concentration (%): 40  Vt Set: 0 mL  PEEP/CPAP: 5 cmH20  Pressure Support: 5 cmH20    Plan: wean to extubate    GI/:     Diet/Nutrition Received: NPO  Last Bowel Movement: 05/07/25  Voiding Characteristics: urethral catheter (bladder)    Intake/Output Summary (Last 24 hours) at 5/9/2025 1609  Last data filed at 5/9/2025 1501  Gross per 24 hour   Intake 5931.95 ml   Output 1750 ml   Net 4181.95 ml          Labs/Accuchecks:  Recent Labs   Lab 05/09/25  0238   WBC 17.16*   RBC 3.71*   HGB 11.6*   HCT 34.6*         Recent Labs   Lab 05/09/25  0238      K 3.4*   CO2 16*   *   BUN 4*   CREATININE 0.7   ALKPHOS 51   ALT 7*   AST 12   BILITOT 0.3      Recent Labs   Lab 05/09/25  0238   PROTIME 10.6   INR 1.0   APTT 23.4    No results for input(s): "CPK", "CPKMB", "TROPONINI", "MB" in the last 168 hours.    Electrolytes: N/A - electrolytes WDL  Accuchecks: Q shift    Gtts:   nicardipine  0-15 mg/hr Intravenous Continuous   Stopped at 05/09/25 1501       LDA/Wounds:    Joshua Risk Assessment  Sensory Perception: 3-->slightly limited  Moisture: 4-->rarely moist  Activity: 1-->bedfast  Mobility: 3-->slightly limited  Nutrition: 2-->probably inadequate  Friction and " Shear: 2-->potential problem  Joshua Score: 15    Is your joshua score 12 or less? no            Restraints:   Restraint Order  Length of Order: Order good for next 24 hours or when removed.  Date that the current order will : 05/10/25  Time that the current order will : 0458  Order Upon Application: Yes    [facility]

## 2025-05-09 NOTE — ASSESSMENT & PLAN NOTE
Pt is s/p left translab craniotomy for acoustic neuroma resection on 5/8    Plan:  Admitted ICU postop  Q1h neurochecks  SBP<160  WTE  Dex 4q6  Pain control, nausea control  FU postop MRI  PTOT OOB  Monitor facial function  Ildefonso, SCD, SQH today

## 2025-05-09 NOTE — EICU
Virtual ICU Quality Rounds    Admit Date: 5/8/2025  Hospital Day: 1    ICU Day: 8h    24H Vital Sign Range:  Temp:  [98.4 °F (36.9 °C)-100.4 °F (38 °C)]   Pulse:  []   Resp:  [14-26]   BP: (105-167)/(59-83)   SpO2:  [100 %]   Arterial Line BP: ()/()     VICU Surveillance Screening    Daily review of  line necessity(optional): Urinary catheter in place    Griffin Indications : Post operative order specific to the griffin catheter    LDA reconciliation : Yes

## 2025-05-09 NOTE — HOSPITAL COURSE
5/9 POD1 tolerated procedure well, monitored in ICU overnight intubated. CTH with expected changes. Plan to extubate today, MRI pending

## 2025-05-09 NOTE — PLAN OF CARE
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]  Acoustic neuroma [D33.3]    Admission Date: 5/8/2025  Expected Discharge Date:     Transition of Care Barriers: (P) None    Payor: BLUE CROSS BLUE SHIELD / Plan: BCBS ALL OUT OF STATE / Product Type: PPO /     Extended Emergency Contact Information  Primary Emergency Contact: MarcosSb torres   [facility]  Home Phone: [phone]  Mobile Phone: [phone]  Relation: Spouse    Discharge Plan A: (P) Home, Home with family  Discharge Plan B: (P) Home, Home with family      [id number] - [place] - [address]  [address]  [facility] [address]  Phone: [phone] Fax: [phone]      Initial Assessment (most recent)       Adult Discharge Assessment - 05/09/25 1329          Discharge Assessment    Assessment Type Discharge Planning Assessment (P)      Confirmed/corrected address, phone number and insurance Yes (P)      Confirmed Demographics Correct on Facesheet (P)      Source of Information family (P)     Sb Go [phone]    When was your last doctors appointment? -- (P)    Feb 2025    Communicated FUENTES with patient/caregiver Yes (P)      Reason For Admission Acoustic neuroma (P)      People in Home spouse;child(shilpi), adult;child(shilpi), dependent (P)      Do you expect to return to your current living situation? Yes (P)      Do you have help at home or someone to help you manage your care at home? Yes (P)      Who are your caregiver(s) and their phone number(s)?  Sb Go [phone] (P)      Prior to hospitilization cognitive status: Alert/Oriented (P)      Current cognitive status: Alert/Oriented (P)      Walking or Climbing Stairs Difficulty no (P)      Dressing/Bathing Difficulty no (P)      Home Layout Able to live on 1st floor (P)    SSH, one entry step    Equipment  Currently Used at Home none (P)      Readmission within 30 days? No (P)      Patient currently being followed by outpatient case management? No (P)      Do you currently have service(s) that help you manage your care at home? No (P)      Do you take prescription medications? Yes (P)      Do you have prescription coverage? Yes (P)      Coverage BCBS all out of state (P)      Do you have any problems affording any of your prescribed medications? No (P)      Is the patient taking medications as prescribed? yes (P)      Who is going to help you get home at discharge?  Sb Go [phone] (P)      How do you get to doctors appointments? car, drives self;family or friend will provide (P)      Are you on dialysis? No (P)      Do you take coumadin? No (P)      Discharge Plan A Home;Home with family (P)      Discharge Plan B Home;Home with family (P)      DME Needed Upon Discharge  none (P)      Discharge Plan discussed with: Spouse/sig other (P)      Name(s) and Number(s)  Sb Go [phone] (P)      Transition of Care Barriers None (P)         Physical Activity    On average, how many days per week do you engage in moderate to strenuous exercise (like a brisk walk)? 7 days (P)    walking the dogs       Financial Resource Strain    How hard is it for you to pay for the very basics like food, housing, medical care, and heating? Not hard at all (P)         Housing Stability    In the last 12 months, was there a time when you were not able to pay the mortgage or rent on time? No (P)      At any time in the past 12 months, were you homeless or living in a shelter (including now)? No (P)         Transportation Needs    In the past 12 months, has lack of transportation kept you from medical appointments or from getting medications? No (P)      In the past 12 months, has lack of transportation kept you from meetings, work, or from getting things needed for daily living? No (P)         Food Insecurity  "   Within the past 12 months, you worried that your food would run out before you got the money to buy more. Never true (P)      Within the past 12 months, the food you bought just didn't last and you didn't have money to get more. Never true (P)         Stress    Do you feel stress - tense, restless, nervous, or anxious, or unable to sleep at night because your mind is troubled all the time - these days? Very much (P)         Social Isolation    How often do you feel lonely or isolated from those around you?  Never (P)         Alcohol Use    Q1: How often do you have a drink containing alcohol? Monthly or less (P)      Q3: How often do you have six or more drinks on one occasion? Never (P)         [place]    In the past 12 months has the electric, gas, oil, or water company threatened to shut off services in your home? No (P)         Health Literacy    How often do you need to have someone help you when you read instructions, pamphlets, or other written material from your doctor or pharmacy? Never (P)         OTHER    Name(s) of People in Home  Sb Go [phone] and 16 year old son and 20 year old daughter (P)                    SW attempted to visit  Pt  at bedside but medical team was in the room.  Nurse reported that Pt had been extubated this date and FUENTES is "early next week".   Discharge planning booklet left in room.     SW phoned Pt's  Sb Go [phone] to discuss role of case management in dc process and obtain assessment info.   confirmed facesheet info. SSH with one step entry.   No DME. No HH.  No dialysis.  No bloodthinners.  No concerns about housing, transportation, meds, food.  Much family support included 16 year old son and 20 year old daughter.  .  Very minimal alcohol use and no drug use.  Independent with ADL and mobility.   Walks dogs daily.   stated Pt experience significant anxiety/stress. Pt typically drives self to appointments and  " will transport home.    Discharge Plan A and Plan B have been determined by review of patient's clinical status, future medical and therapeutic needs, and coverage/benefits for post-acute care in coordination with multidisciplinary team members.     [facility] Duffy [facility]    [phone]

## 2025-05-09 NOTE — EICU
"Intervention Initiated From:  [facility] / EICU    Satish intervened regarding:  Rounding (Video assessment)    Nurse Notified:  Yes      Virtual ICU Admission    Admit Date: 2025  LOS: 1  Code Status: Full Code   : 1975  Bed: 9093/9093 A:     Diagnosis: Acoustic neuroma    Patient  has a past medical history of Acoustic neuroma, Asthma, and History of PSVT (paroxysmal supraventricular tachycardia).    Last VS: BP (!) 160/90 (BP Location: Right arm, Patient Position: Lying)   Pulse 105   Temp 98.2 °F (36.8 °C) (Temporal)   Resp 18   Ht 5' 3" (1.6 m)   Wt 74.8 kg (164 lb 14.5 oz)   LMP 2019 (Exact Date)   SpO2 100%   Breastfeeding No   BMI 29.21 kg/m²       VICU Review    VICU nurse assessment :  Kiana completed, LDA documentation reconciliation completed, Skin care/wounds LDA reconciliation, Ventilator settings reviewed , Sign and held orders reviewed/released, Stress ulcer prophylaxis for ventilated patients review, and VTE prophylaxis review        Nursing orders placed : IP JARETT Peripheral IV Access          "

## 2025-05-09 NOTE — ANESTHESIA POSTPROCEDURE EVALUATION
Anesthesia Post Evaluation    Patient: Bibiana Go    Procedure(s) Performed: Procedure(s) (LRB):  CRANIOTOMY, TRANSLABYRINTHINE APPROACH, WITH SURGICAL REMOVAL OF ACOUSTIC NEUROMA (Left)  CRANIOTOMY, TRANSLABYRINTHINE APPROACH, WITH SURGICAL REMOVAL OF ACOUSTIC NEUROMA, Fat graft (Left)    Final Anesthesia Type: general      Patient location during evaluation: ICU  Patient participation: No - Unable to Participate, Sedation  Level of consciousness: sedated  Post-procedure vital signs: reviewed and stable  Pain management: adequate  Airway patency: patent    PONV status at discharge: No PONV  Anesthetic complications: no      Cardiovascular status: hemodynamically stable  Respiratory status: intubated                Vitals Value Taken Time   /65 05/09/25 05:31   Temp 37.7 °C (99.86 °F) 05/09/25 05:49   Pulse 91 05/09/25 05:49   Resp 14 05/09/25 05:49   SpO2 100 % 05/09/25 05:49   Vitals shown include unfiled device data.      No case tracking events are documented in the log.      Pain/Miriam Score: Pain Rating Prior to Med Admin: 8 (5/9/2025  2:32 AM)

## 2025-05-10 VITALS
HEIGHT: 63 IN | OXYGEN SATURATION: 94 % | RESPIRATION RATE: 23 BRPM | BODY MASS INDEX: 29.21 KG/M2 | TEMPERATURE: 99 F | HEART RATE: 119 BPM | WEIGHT: 164.88 LBS | DIASTOLIC BLOOD PRESSURE: 78 MMHG | SYSTOLIC BLOOD PRESSURE: 115 MMHG

## 2025-05-10 LAB
ABSOLUTE EOSINOPHIL (OHS): 0 K/UL
ABSOLUTE MONOCYTE (OHS): 0.62 K/UL (ref 0.3–1)
ABSOLUTE NEUTROPHIL COUNT (OHS): 19.42 K/UL (ref 1.8–7.7)
ALBUMIN SERPL BCP-MCNC: 3.4 G/DL (ref 3.5–5.2)
ALP SERPL-CCNC: 68 UNIT/L (ref 40–150)
ALT SERPL W/O P-5'-P-CCNC: 8 UNIT/L (ref 10–44)
ANION GAP (OHS): 11 MMOL/L (ref 8–16)
AST SERPL-CCNC: 18 UNIT/L (ref 11–45)
BASOPHILS # BLD AUTO: 0.05 K/UL
BASOPHILS NFR BLD AUTO: 0.2 %
BILIRUB SERPL-MCNC: 0.6 MG/DL (ref 0.1–1)
BUN SERPL-MCNC: 4 MG/DL (ref 6–20)
CALCIUM SERPL-MCNC: 8.6 MG/DL (ref 8.7–10.5)
CHLORIDE SERPL-SCNC: 111 MMOL/L (ref 95–110)
CO2 SERPL-SCNC: 21 MMOL/L (ref 23–29)
CREAT SERPL-MCNC: 0.6 MG/DL (ref 0.5–1.4)
ERYTHROCYTE [DISTWIDTH] IN BLOOD BY AUTOMATED COUNT: 13.2 % (ref 11.5–14.5)
GFR SERPLBLD CREATININE-BSD FMLA CKD-EPI: >60 ML/MIN/1.73/M2
GLUCOSE SERPL-MCNC: 144 MG/DL (ref 70–110)
HCT VFR BLD AUTO: 33 % (ref 37–48.5)
HGB BLD-MCNC: 11.3 GM/DL (ref 12–16)
IMM GRANULOCYTES # BLD AUTO: 0.16 K/UL (ref 0–0.04)
IMM GRANULOCYTES NFR BLD AUTO: 0.7 % (ref 0–0.5)
LACTATE SERPL-SCNC: 2.7 MMOL/L (ref 0.5–2.2)
LYMPHOCYTES # BLD AUTO: 1.58 K/UL (ref 1–4.8)
MAGNESIUM SERPL-MCNC: 2 MG/DL (ref 1.6–2.6)
MCH RBC QN AUTO: 31.3 PG (ref 27–31)
MCHC RBC AUTO-ENTMCNC: 34.2 G/DL (ref 32–36)
MCV RBC AUTO: 91 FL (ref 82–98)
NUCLEATED RBC (/100WBC) (OHS): 0 /100 WBC
PHOSPHATE SERPL-MCNC: 3 MG/DL (ref 2.7–4.5)
PLATELET # BLD AUTO: 247 K/UL (ref 150–450)
PMV BLD AUTO: 10.8 FL (ref 9.2–12.9)
POCT GLUCOSE: 143 MG/DL (ref 70–110)
POTASSIUM SERPL-SCNC: 3.9 MMOL/L (ref 3.5–5.1)
PROT SERPL-MCNC: 6.5 GM/DL (ref 6–8.4)
RBC # BLD AUTO: 3.61 M/UL (ref 4–5.4)
RELATIVE EOSINOPHIL (OHS): 0 %
RELATIVE LYMPHOCYTE (OHS): 7.2 % (ref 18–48)
RELATIVE MONOCYTE (OHS): 2.8 % (ref 4–15)
RELATIVE NEUTROPHIL (OHS): 89.1 % (ref 38–73)
SODIUM SERPL-SCNC: 143 MMOL/L (ref 136–145)
WBC # BLD AUTO: 21.83 K/UL (ref 3.9–12.7)

## 2025-05-10 PROCEDURE — 84100 ASSAY OF PHOSPHORUS: CPT

## 2025-05-10 PROCEDURE — 83605 ASSAY OF LACTIC ACID: CPT

## 2025-05-10 PROCEDURE — 63600175 PHARM REV CODE 636 W HCPCS

## 2025-05-10 PROCEDURE — 93005 ELECTROCARDIOGRAM TRACING: CPT

## 2025-05-10 PROCEDURE — 85025 COMPLETE CBC W/AUTO DIFF WBC: CPT

## 2025-05-10 PROCEDURE — 25000003 PHARM REV CODE 250: Performed by: PSYCHIATRY & NEUROLOGY

## 2025-05-10 PROCEDURE — 83735 ASSAY OF MAGNESIUM: CPT

## 2025-05-10 PROCEDURE — 93010 ELECTROCARDIOGRAM REPORT: CPT | Mod: ,,, | Performed by: INTERNAL MEDICINE

## 2025-05-10 PROCEDURE — 63600175 PHARM REV CODE 636 W HCPCS: Performed by: STUDENT IN AN ORGANIZED HEALTH CARE EDUCATION/TRAINING PROGRAM

## 2025-05-10 PROCEDURE — 99232 SBSQ HOSP IP/OBS MODERATE 35: CPT | Mod: ,,, | Performed by: STUDENT IN AN ORGANIZED HEALTH CARE EDUCATION/TRAINING PROGRAM

## 2025-05-10 PROCEDURE — 25000003 PHARM REV CODE 250: Performed by: STUDENT IN AN ORGANIZED HEALTH CARE EDUCATION/TRAINING PROGRAM

## 2025-05-10 PROCEDURE — 80053 COMPREHEN METABOLIC PANEL: CPT

## 2025-05-10 RX ORDER — PROMETHAZINE HYDROCHLORIDE 12.5 MG/1
25 TABLET ORAL EVERY 6 HOURS PRN
Status: DISCONTINUED | OUTPATIENT
Start: 2025-05-10 | End: 2025-05-10 | Stop reason: HOSPADM

## 2025-05-10 RX ORDER — ACETAMINOPHEN 325 MG/1
650 TABLET ORAL EVERY 6 HOURS PRN
Qty: 120 TABLET | Refills: 0 | Status: SHIPPED | OUTPATIENT
Start: 2025-05-10 | End: 2025-05-10 | Stop reason: HOSPADM

## 2025-05-10 RX ORDER — OXYCODONE HYDROCHLORIDE 5 MG/1
5 TABLET ORAL EVERY 6 HOURS PRN
Qty: 28 TABLET | Refills: 0 | Status: SHIPPED | OUTPATIENT
Start: 2025-05-10 | End: 2025-05-17

## 2025-05-10 RX ORDER — HYDRALAZINE HYDROCHLORIDE 20 MG/ML
10 INJECTION INTRAMUSCULAR; INTRAVENOUS EVERY 4 HOURS PRN
Status: DISCONTINUED | OUTPATIENT
Start: 2025-05-10 | End: 2025-05-10 | Stop reason: HOSPADM

## 2025-05-10 RX ORDER — DEXAMETHASONE 2 MG/1
TABLET ORAL
Qty: 34 TABLET | Refills: 0 | Status: SHIPPED | OUTPATIENT
Start: 2025-05-10 | End: 2025-05-19

## 2025-05-10 RX ORDER — POLYETHYLENE GLYCOL 3350 17 G/17G
17 POWDER, FOR SOLUTION ORAL DAILY
Qty: 238 G | Refills: 0 | Status: SHIPPED | OUTPATIENT
Start: 2025-05-11 | End: 2025-05-10 | Stop reason: HOSPADM

## 2025-05-10 RX ORDER — LABETALOL HCL 20 MG/4 ML
10 SYRINGE (ML) INTRAVENOUS EVERY 4 HOURS PRN
Status: DISCONTINUED | OUTPATIENT
Start: 2025-05-10 | End: 2025-05-10 | Stop reason: HOSPADM

## 2025-05-10 RX ORDER — FAMOTIDINE 20 MG/1
20 TABLET, FILM COATED ORAL 2 TIMES DAILY
Status: CANCELLED | OUTPATIENT
Start: 2025-05-10

## 2025-05-10 RX ORDER — FAMOTIDINE 20 MG/1
20 TABLET, FILM COATED ORAL 2 TIMES DAILY
Qty: 60 TABLET | Refills: 11 | Status: SHIPPED | OUTPATIENT
Start: 2025-05-10 | End: 2026-05-10

## 2025-05-10 RX ORDER — AMOXICILLIN 250 MG
1 CAPSULE ORAL 2 TIMES DAILY
Qty: 14 TABLET | Refills: 0 | Status: SHIPPED | OUTPATIENT
Start: 2025-05-10 | End: 2025-05-10 | Stop reason: HOSPADM

## 2025-05-10 RX ADMIN — FAMOTIDINE 20 MG: 10 INJECTION, SOLUTION INTRAVENOUS at 08:05

## 2025-05-10 RX ADMIN — DEXAMETHASONE SODIUM PHOSPHATE 4 MG: 4 INJECTION INTRA-ARTICULAR; INTRALESIONAL; INTRAMUSCULAR; INTRAVENOUS; SOFT TISSUE at 05:05

## 2025-05-10 RX ADMIN — MUPIROCIN: 20 OINTMENT TOPICAL at 08:05

## 2025-05-10 RX ADMIN — LABETALOL HYDROCHLORIDE 10 MG: 5 INJECTION, SOLUTION INTRAVENOUS at 02:05

## 2025-05-10 RX ADMIN — ACETAMINOPHEN 650 MG: 325 TABLET ORAL at 06:05

## 2025-05-10 RX ADMIN — ACETAMINOPHEN 650 MG: 325 TABLET ORAL at 01:05

## 2025-05-10 RX ADMIN — SENNOSIDES AND DOCUSATE SODIUM 1 TABLET: 50; 8.6 TABLET ORAL at 08:05

## 2025-05-10 RX ADMIN — LABETALOL HYDROCHLORIDE 10 MG: 5 INJECTION, SOLUTION INTRAVENOUS at 09:05

## 2025-05-10 RX ADMIN — HYDRALAZINE HYDROCHLORIDE 10 MG: 20 INJECTION, SOLUTION INTRAMUSCULAR; INTRAVENOUS at 05:05

## 2025-05-10 RX ADMIN — POLYETHYLENE GLYCOL 3350 17 G: 17 POWDER, FOR SOLUTION ORAL at 08:05

## 2025-05-10 RX ADMIN — HYPROMELLOSE 2910 2 DROP: 5 SOLUTION/ DROPS OPHTHALMIC at 01:05

## 2025-05-10 RX ADMIN — HYPROMELLOSE 2910 2 DROP: 5 SOLUTION/ DROPS OPHTHALMIC at 09:05

## 2025-05-10 RX ADMIN — DEXAMETHASONE SODIUM PHOSPHATE 4 MG: 4 INJECTION INTRA-ARTICULAR; INTRALESIONAL; INTRAMUSCULAR; INTRAVENOUS; SOFT TISSUE at 11:05

## 2025-05-10 RX ADMIN — HYPROMELLOSE 2910 2 DROP: 5 SOLUTION/ DROPS OPHTHALMIC at 05:05

## 2025-05-10 NOTE — PLAN OF CARE
"[facility] Care Plan  POC reviewed with Bibiana Go and family at 1400. Patient and Family verbalized understanding. Questions and concerns addressed. No acute events today. Pt progressing toward goals. Assessments ongoing. See below and flowsheets for full assessment and VS info.   - PRN tylenol given x 1  - pt up to bathroom          Is this a stroke patient? no    Neuro:  [place] Coma Scale  Best Eye Response: 4-->(E4) spontaneous  Best Motor Response: 6-->(M6) obeys commands  Best Verbal Response: 5-->(V5) oriented  [place] Coma Scale Score: 15  Pupil PERRLA: yes     24 hr Temp:  [98 °F (36.7 °C)-98.9 °F (37.2 °C)]     CV:   Rhythm: sinus tachycardia  BP goals:   SBP < 150  MAP > 65    Resp:      Vent Mode: Spont  Set Rate: 0 BPM  Oxygen Concentration (%): 40  Vt Set: 0 mL  PEEP/CPAP: 5 cmH20  Pressure Support: 5 cmH20    Plan: N/A    GI/:     Diet/Nutrition Received: mechanical/dental soft  Last Bowel Movement: 05/07/25  Voiding Characteristics: frequency    Intake/Output Summary (Last 24 hours) at 5/10/2025 1452  Last data filed at 5/10/2025 0842  Gross per 24 hour   Intake 566.47 ml   Output 700 ml   Net -133.53 ml     Unmeasured Output  Unmeasured Urine Occurrence: 1    Labs/Accuchecks:  Recent Labs   Lab 05/10/25  0245   WBC 21.83*   RBC 3.61*   HGB 11.3*   HCT 33.0*         Recent Labs   Lab 05/10/25  0245      K 3.9   CO2 21*   *   BUN 4*   CREATININE 0.6   ALKPHOS 68   ALT 8*   AST 18   BILITOT 0.6      Recent Labs   Lab 05/09/25  0238   PROTIME 10.6   INR 1.0   APTT 23.4    No results for input(s): "CPK", "CPKMB", "TROPONINI", "MB" in the last 168 hours.    Electrolytes: N/A - electrolytes WDL  Accuchecks: none    Gtts:      LDA/Wounds:    Joshua Risk Assessment  Sensory Perception: 3-->slightly limited  Moisture: 4-->rarely moist  Activity: 3-->walks occasionally  Mobility: 3-->slightly limited  Nutrition: 2-->probably inadequate  Friction and Shear: 2-->potential problem  Joshua " Score: 17    Is your hattie score 12 or less? no            Restraints:   Restraint Order  Length of Order: Order good for next 24 hours or when removed.  Date that the current order will : 05/10/25  Time that the current order will : 0458  Order Upon Application: Yes

## 2025-05-10 NOTE — PLAN OF CARE
Pt ready for discharge. Discharge instructions given and patient verbalizes understanding. IVs removed. Tele removed. Pt will discharge via wheel chair. Will continue to monitor until discharge.

## 2025-05-10 NOTE — EICU
Intervention Initiated From:  [facility] / ERANU    Satish intervened regarding:  Rounding (Video assessment)    VICU Night Rounds Checklist  24H Vital Sign Range:  Temp:  [98.3 °F (36.8 °C)-100.4 °F (38 °C)]   Pulse:  []   Resp:  [14-37]   BP: (101-167)/(54-83)   SpO2:  [92 %-100 %]   Arterial Line BP: ()/()     Video rounds and LDA reconciliation

## 2025-05-10 NOTE — ASSESSMENT & PLAN NOTE
50 y/o F w/ PMH of pSVT (no home medications, resolved many years ago) and L acoustic neuroma (diagnosed 2019) who presents to [facility] s/p L translabyrinthine approach for resection of L acoustic neuroma on 5/8 with NSGY and ENT.    Admitted to [facility]  Q1h neuro checks, I&Os, and vitals   CBC, CMP, mag, phos daily   PT/INR/PTT/LA pending  Postop CT head without contrast and MRI with and without contrast stable  Dex 4 Q6   H2B  Ancef  NSGY following   SBP <140  Prn labetalol, hydralazine   Cardene, wean as tolerated  PRN pain/nausea regimen   NPO  SCDs; hold chemical VTE ppx in acute setting   PT/OT

## 2025-05-10 NOTE — SUBJECTIVE & OBJECTIVE
Interval History: POD1. Remained intubated overnight. No issues overnight. Patient denies vertigo.     Medications:  Continuous Infusions:   nicardipine  0-15 mg/hr Intravenous Continuous   Stopped at 05/09/25 1801     Scheduled Meds:   dexAMETHasone injection  4 mg Intravenous Q6H    famotidine (PF)  20 mg Intravenous Q12H    mupirocin   Nasal BID    [START ON 5/10/2025] polyethylene glycol  17 g Oral Daily    senna-docusate  1 tablet Oral BID     PRN Meds:  Current Facility-Administered Medications:     acetaminophen, 650 mg, Oral, Q6H PRN    dextrose 50%, 12.5 g, Intravenous, PRN    glucagon (human recombinant), 1 mg, Intramuscular, PRN    hydrALAZINE, 10 mg, Intravenous, Q4H PRN    insulin aspart U-100, 0-5 Units, Subcutaneous, Q6H PRN    labetalol, 10 mg, Intravenous, Q4H PRN    magnesium oxide, 800 mg, Oral, PRN    magnesium oxide, 800 mg, Oral, PRN    oxyCODONE, 10 mg, Oral, Q4H PRN    oxyCODONE, 5 mg, Oral, Q4H PRN    potassium bicarbonate, 35 mEq, Oral, PRN    potassium bicarbonate, 50 mEq, Oral, PRN    potassium bicarbonate, 60 mEq, Oral, PRN    potassium, sodium phosphates, 2 packet, Oral, PRN    potassium, sodium phosphates, 2 packet, Oral, PRN    potassium, sodium phosphates, 2 packet, Oral, PRN    prochlorperazine, 5 mg, Intravenous, Q6H PRN    promethazine (PHENERGAN) 12.5 mg in 0.9% NaCl 50 mL IVPB, 12.5 mg, Intravenous, Q6H PRN    sodium chloride 0.9%, 10 mL, Intravenous, PRN     Review of patient's allergies indicates:   Allergen Reactions    Zofran [ondansetron hcl]      Constipation     Objective:     Vital Signs (24h Range):  Temp:  [98.3 °F (36.8 °C)-100.4 °F (38 °C)] 98.7 °F (37.1 °C)  Pulse:  [] 120  Resp:  [14-37] 18  SpO2:  [92 %-100 %] 95 %  BP: (101-167)/(54-83) 142/68  Arterial Line BP: ()/() 128/91     Date 05/09/25 0700 - 05/10/25 0659   Shift 9845-5129 5535-7025 4946-1767 24 Hour Total   INTAKE   P.O.  200  200   I.V.(mL/kg) 114.5(1.5) 41.9(0.6)  156.4(2.1)   IV  Piggyback 1958.1 124.6  2082.7   Shift Total(mL/kg) 2072.6(27.7) 366.5(4.9)  2439.1(32.6)   OUTPUT   Urine(mL/kg/hr)  100(0.2)  100   Shift Total(mL/kg)  100(1.3)  100(1.3)   Weight (kg) 74.8 74.8 74.8 74.8     Lines/Drains/Airways       Drain  Duration             Female External Urinary Catheter w/ Suction 05/09/25 2141 <1 day              Peripheral Intravenous Line  Duration                  Peripheral IV - Single Lumen 05/08/25 0603 18 G Anterior;Right Forearm 1 day         Peripheral IV - Single Lumen 05/08/25 0744 18 G Right Wrist 1 day         Peripheral IV - Single Lumen 05/08/25 0759 18 G Left Hand 1 day                     Physical Exam  Intubated, awake  Follows commands  PERRL, HB 1/6 on left, 5/6 on right  Incision CDI       Significant Labs:  CBC:   Recent Labs   Lab 05/09/25  0238   WBC 17.16*   RBC 3.71*   HGB 11.6*   HCT 34.6*      MCV 93   MCH 31.3*   MCHC 33.5     CMP:   Recent Labs   Lab 05/09/25  0238   *   CALCIUM 8.1*   ALBUMIN 3.0*   PROT 5.8*      K 3.4*   CO2 16*   *   BUN 4*   CREATININE 0.7   ALKPHOS 51   ALT 7*   AST 12   BILITOT 0.3       Significant Diagnostics:  CT: I have reviewed all pertinent results/findings within the past 24 hours and my personal findings are:  expected post op changes

## 2025-05-10 NOTE — DISCHARGE INSTRUCTIONS
"ENT Post-operative Instructions    Precautions   Do not blow your nose until your physician has indicated that your ear is healed.  Any accumulated secretions in the nose may be drawn back into the throat and expectorated if desired.  This particularly important if you develop a cold.   Do not pop your ears by holding your nose and blowing air through the eustachian tube into the ear.  If it is necessary to sneeze, do so with your mouth open.  Do not allow water to enter the ear until advised by your physician that he ear is healed.  Until such time, when showering or washing your hair, cotton may be placed in the outer ear opening and covered in Vaseline.  If an incision was made in the skin behind your ear, water should be kept away from this area for 1 week.  Do not take an unnecessary chance of catching a cold.  Avoid undue exposure or fatigue.  Should you catch a cold, treat it in your usual way, reporting to your physician if you develop ear symptoms  You may anticipate a certain amount of pulsation, popping, clicking, and other sounds in the ear, and a feeling of fullness in the ear.  Occasional sharp shooting pains are not unusual.  Sometimes it may feel as if there is liquid in the ear.  Do not plan to drive a car home from the hospital.    Dizziness  Minor dizziness may be present on head motion and not concern you unless it increases    Discharge  A bloody or Watery discharge may occur during the healing period.  The outer ear cotton may be changed if necessary   A purulent discharge at any time is an indication to call to make an appointment    You can remove the hard dressing, fluff gauze, and "U-shaped" plastic behind the ear TOMORROW. The sutures will be removed at a later date. You can clean around the incision with 1/2 hyodrogen peroxide and 1/2 water with a q-tip gently to remove crusts. Otherwise apply vaseline or aquafor to the incision twice per day. Do NOT get incision wet until you see  " Master     EYE care:  - apply artificial tears atleast 4 times per day  - apply ointment to the left eye before going to bed

## 2025-05-10 NOTE — EICU
Intervention Initiated From:  [facility] / EICU    Virtual ICU Quality Rounds    Admit Date: 5/8/2025  Hospital Day: 2    ICU Day: 1d 10h    24H Vital Sign Range:  Temp:  [98 °F (36.7 °C)-98.9 °F (37.2 °C)]   Pulse:  []   Resp:  [12-29]   BP: (101-181)/(54-82)   SpO2:  [92 %-99 %]     VICU Surveillance Screening

## 2025-05-10 NOTE — ASSESSMENT & PLAN NOTE
48 yo female s/p left translab craniotomy for acoustic neuroma resection on 5/8     - Admitted ICU postop  - Q1h neurochecks  - Pain control, nausea control  - PTOT OOB  - Eye care   - Lacrolube with eye taping at night   - Refresh eye drops ad kaya during the day  - Remainder of care per Neuro ICU

## 2025-05-10 NOTE — DISCHARGE SUMMARY
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 She had difficulty eating and reported a chemical and salty taste that started in November. Recently, her main complaint was cyclic vomiting up to 3 times a day which was associated with headaches. Preoperatively, she denied any facial numbness but noted that the left eye intermittently irritates her, and she had no obvious facial droop and no swallowing issues. During the surgery, she received 1 PRBC and 5L of fluids. The procedure lasted around 17 hours, so the decision was made to leave her intubated with the plans to wean for extubation in the morning. Postop CT head and MRI pending. Postop, she is admitted to [facility] for close neurologic monitoring.     Hospital Course by Event: 5/10: NAEO. Dysarthria improving.Tolerating room air post extubation. Denies any vertigo. Did well with PT/OT. SBP goal liberalized to less than 150. Exam stable.     ENT and Neurosurgery recommending discharge. Outpatient follow up requested on behalf of patient. At the time of discharge the patient was neurologically stable, was afebrile, and vital signs were stable. She was tolerating a diet and voiding without difficulty. Discharge instructions were verbally discussed with the patient, including wound care and follow up appointments. The patient was also given a discharge instruction sheet explaining the  aforementioned discussion. The patient verbalized understanding of instructions and agreed to the plan.     Hospital Course by Problem:   * Acoustic neuroma  50 y/o F w/ PMH of pSVT (no home medications, resolved many years ago) and L acoustic neuroma (diagnosed 2019) who presents to [facility] s/p L translabyrinthine approach for resection of L acoustic neuroma on 5/8 with NSGY and ENT.    Admitted to [facility]  Q1h neuro checks, I&Os, and vitals   CBC, CMP, mag, phos daily   PT/INR/PTT/LA pending  Postop CT head without contrast and MRI with and without contrast stable  Dex 4 Q6   H2B  Ancef  NSGY following   SBP <140  Prn labetalol, hydralazine   Cardene, wean as tolerated  PRN pain/nausea regimen   NPO  SCDs; hold chemical VTE ppx in acute setting   PT/OT    On mechanically assisted ventilation  Extubated 5/9, tolerating room air        Goals of Care Treatment Preferences:  Code Status: Full Code      Significant Results:    Laboratory:  Lab Results   Component Value Date    HGBA1C 5.2 03/10/2025    CHOL 182 08/06/2024    HDL 56 08/06/2024    LDLCALC 111.0 08/06/2024    TRIG 75 08/06/2024    TSH 1.460 08/06/2024       Pending Results: N/A    Consultations:  IP CONSULT TO SOCIAL WORK/CASE MANAGEMENT      Procedures:   Procedure(s) (LRB):  CRANIOTOMY, TRANSLABYRINTHINE APPROACH, WITH SURGICAL REMOVAL OF ACOUSTIC NEUROMA (Left)  CRANIOTOMY, TRANSLABYRINTHINE APPROACH, WITH SURGICAL REMOVAL OF ACOUSTIC NEUROMA, Fat graft (Left) by Farzad Mccord DO.      Medications:      Medication List        START taking these medications      artificial tears 0.5 % ophthalmic solution  Commonly known as: ISOPTO TEARS  Place 2 drops into the left eye 4 (four) times daily.     dexAMETHasone 2 MG tablet  Commonly known as: DECADRON  Take 2 tablets by mouth every 6 hours for 1 day, THEN 2 tablets every 8  hours for 2 days, THEN 2 tablets every 12 hours for 2 days, THEN 1 tablet every 12 hours for 2 days, THEN 1 tablet once daily for 2  days.  Start taking on: May 10, 2025     famotidine 20 MG tablet  Commonly known as: PEPCID  Take 1 tablet (20 mg total) by mouth 2 (two) times daily.     oxyCODONE 5 MG immediate release tablet  Commonly known as: ROXICODONE  Take 1 tablet (5 mg total) by mouth every 6 (six) hours as needed.     white petrolatum-mineral oiL 94-3 % Oint  Place into the left eye every evening.            CONTINUE taking these medications      promethazine 25 MG tablet  Commonly known as: PHENERGAN  Take 1 tablet (25 mg total) by mouth every 6 (six) hours as needed for Nausea.               Where to Get Your Medications        These medications [facility]  [address] [facility] [address]      Hours: Always Open Phone: [phone]   artificial tears 0.5 % ophthalmic solution  dexAMETHasone 2 MG tablet  famotidine 20 MG tablet  oxyCODONE 5 MG immediate release tablet  white petrolatum-mineral oiL 94-3 % Oint       Diet: Soft and bite size    Activity: As tolerated.     Disposition: Discharged to home in good condition.    Follow Up Plan:  Patient to follow up with Neurosurgery and ENT for post op care.     This discharge took more than 30 minutes to complete.    Lizzy Edmonds PA-C  Neurocritical Care  [facility]

## 2025-05-10 NOTE — PROGRESS NOTES
[facility]  Otorhinolaryngology-Head & Neck Surgery  Progress Note    Subjective:     Post-Op Info:  Procedure(s) (LRB):  CRANIOTOMY, TRANSLABYRINTHINE APPROACH, WITH SURGICAL REMOVAL OF ACOUSTIC NEUROMA (Left)  CRANIOTOMY, TRANSLABYRINTHINE APPROACH, WITH SURGICAL REMOVAL OF ACOUSTIC NEUROMA, Fat graft (Left)   2 Days Post-Op  Hospital Day: 3     Interval History: Reports doing very well overall. No vertigo. Did well with PTOT    Medications:  Continuous Infusions:   nicardipine  0-15 mg/hr Intravenous Continuous   Stopped at 05/09/25 1801     Scheduled Meds:   artificial tears  2 drop Left Eye QID    dexAMETHasone injection  4 mg Intravenous Q6H    famotidine (PF)  20 mg Intravenous Q12H    mupirocin   Nasal BID    polyethylene glycol  17 g Oral Daily    senna-docusate  1 tablet Oral BID    white petrolatum-mineral oiL   Left Eye QHS     PRN Meds:  Current Facility-Administered Medications:     acetaminophen, 650 mg, Oral, Q6H PRN    dextrose 50%, 12.5 g, Intravenous, PRN    glucagon (human recombinant), 1 mg, Intramuscular, PRN    hydrALAZINE, 10 mg, Intravenous, Q4H PRN    insulin aspart U-100, 0-5 Units, Subcutaneous, Q6H PRN    labetalol, 10 mg, Intravenous, Q4H PRN    magnesium oxide, 800 mg, Oral, PRN    magnesium oxide, 800 mg, Oral, PRN    oxyCODONE, 10 mg, Oral, Q4H PRN    oxyCODONE, 5 mg, Oral, Q4H PRN    potassium bicarbonate, 35 mEq, Oral, PRN    potassium bicarbonate, 50 mEq, Oral, PRN    potassium bicarbonate, 60 mEq, Oral, PRN    potassium, sodium phosphates, 2 packet, Oral, PRN    potassium, sodium phosphates, 2 packet, Oral, PRN    potassium, sodium phosphates, 2 packet, Oral, PRN    prochlorperazine, 5 mg, Intravenous, Q6H PRN    promethazine (PHENERGAN) 12.5 mg in 0.9% NaCl 50 mL IVPB, 12.5 mg, Intravenous, Q6H PRN    sodium chloride 0.9%, 10 mL, Intravenous, PRN     Review of patient's allergies indicates:   Allergen Reactions    Zofran [ondansetron hcl]      Constipation      Objective:     Vital Signs (24h Range):  Temp:  [98 °F (36.7 °C)-98.9 °F (37.2 °C)] 98.2 °F (36.8 °C)  Pulse:  [] 116  Resp:  [12-37] 19  SpO2:  [92 %-100 %] 98 %  BP: (101-181)/(54-82) 111/55     Date 05/10/25 0700 - 05/11/25 0659   Shift 9538-3269 3764-7679 2160-0365 24 Hour Total   INTAKE   P.O. 200   200   Shift Total(mL/kg) 200(2.7)   200(2.7)   OUTPUT   Shift Total(mL/kg)       Weight (kg) 74.8 74.8 74.8 74.8     Lines/Drains/Airways       Drain  Duration             Female External Urinary Catheter w/ Suction 05/09/25 2141 <1 day              Peripheral Intravenous Line  Duration                  Peripheral IV - Single Lumen 05/08/25 0603 18 G Anterior;Right Forearm 2 days         Peripheral IV - Single Lumen 05/08/25 0744 18 G Right Wrist 2 days         Peripheral IV - Single Lumen 05/08/25 0759 18 G Left Hand 2 days         Peripheral IV - Single Lumen 05/10/25 0321 20 G 1 3/4 in Anterior;Left Forearm <1 day                     Physical Exam     NAD  HB-6 on left  [place] in place over left ear    Significant Labs:  Recent Lab Results  (Last 5 results in the past 24 hours)        05/10/25  0248   05/10/25  0245   05/10/25  0227   05/09/25  2101   05/09/25  1711        Albumin   3.4             ALP   68             ALT   8             Anion Gap   11             AST   18             Baso #   0.05             Basophil %   0.2             BILIRUBIN TOTAL   0.6  Comment: For infants and newborns, interpretation of results should be based   on gestational age, weight and in agreement with clinical   observations.    Premature Infant recommended reference ranges:   0-24 hours:  <8.0 mg/dL   24-48 hours: <12.0 mg/dL   3-5 days:    <15.0 mg/dL   6-29 days:   <15.0 mg/dL             BUN   4             Calcium   8.6             Chloride   111             CO2   21             Creatinine   0.6             eGFR   >60  Comment: Estimated GFR calculated using the CKD-EPI creatinine (2021) equation.              Eos #   0.00             Eos %   0.0             Glucose   144             Gran # (ANC)   19.42             Hematocrit   33.0             Hemoglobin   11.3             Immature Grans (Abs)   0.16  Comment: Mild elevation in immature granulocytes is non specific and can be seen in a variety of conditions including stress response, acute inflammation, trauma and pregnancy. Correlation with other laboratory and clinical findings is essential.             Immature Granulocytes   0.7             Lactic Acid Level     2.7           Lymph #   1.58             Lymph %   7.2             Magnesium    2.0             MCH   31.3             MCHC   34.2             MCV   91             Mono #   0.62             Mono %   2.8             MPV   10.8             Neut %   89.1             nRBC   0             Phosphorus Level   3.0             Platelet Count   247  Comment: This result was previously suppressed from the chart.             POCT Glucose 143       142   181       Potassium   3.9             PROTEIN TOTAL   6.5             RBC   3.61             RDW   13.2             Sodium   143             WBC   21.83                                    Significant Diagnostics:  I have reviewed and interpreted all pertinent imaging results/findings within the past 24 hours.  Assessment/Plan:     * Acoustic neuroma  48 yo female s/p left translab craniotomy for acoustic neuroma resection on 5/8. HB-6 post op on left     - Keep [place] in place until tomorrow morning  - Neurocheck frequency per NSGY  - Pain control, nausea control  - PTOT - discharged from therapy  - Eye care   - Lacrolube nightly   - artificial tears QID during the day  - Remainder of care per Neuro ICU    DISPO: Ok for discharge vs step down from ENT standpoint, defer to NSGY and Neuro ICU. Will arrange Neurotology and Facial plastics.         Edilberto Shi MD  Otorhinolaryngology-Head & Neck Surgery  [facility]

## 2025-05-10 NOTE — ASSESSMENT & PLAN NOTE
48 yo female s/p left translab craniotomy for acoustic neuroma resection on 5/8. HB-6 post op on left     - Keep [place] in place until tomorrow morning  - Neurocheck frequency per NSGY  - Pain control, nausea control  - PTOT - discharged from therapy  - Eye care   - Lacrolube nightly   - artificial tears QID during the day  - Remainder of care per Neuro ICU    DISPO: Ok for discharge vs step down from ENT standpoint, defer to NSGY and Neuro ICU. Will arrange Neurotology and Facial plastics.

## 2025-05-10 NOTE — HOSPITAL COURSE
5/10: BRYANO. Dysarthria improving.Tolerating room air post extubation. Denies any vertigo. Did well with PT/OT. SBP goal liberalized to less than 150. Exam stable.

## 2025-05-10 NOTE — SUBJECTIVE & OBJECTIVE
Interval History:  5/10: NAEO. Dysarthria improving.Tolerating room air post extubation. Denies any vertigo. Did well with PT/OT. SBP goal liberalized to less than 150. Exam stable.     Objective:     Vitals:  Temp: 98.6 °F (37 °C)  Pulse: 110  Rhythm: sinus tachycardia  BP: (!) 146/96  MAP (mmHg): 116  Resp: (!) 28  SpO2: 95 %    Temp  Min: 98 °F (36.7 °C)  Max: 98.9 °F (37.2 °C)  Pulse  Min: 75  Max: 130  BP  Min: 101/56  Max: 181/81  MAP (mmHg)  Min: 74  Max: 116  Resp  Min: 12  Max: 29  SpO2  Min: 92 %  Max: 99 %    05/09 0701 - 05/10 0700  In: 2439.1 [P.O.:200; I.V.:156.4]  Out: 700 [Urine:700]   Unmeasured Output  Unmeasured Urine Occurrence: 1        Physical Exam  Vitals and nursing note reviewed.   Constitutional:       General: She is not in acute distress.     Comments: Sedated. Resting comfortably in bed on mechanical ventilation.   HENT:      Ears:      Comments: L ear cover in place     Nose: Nose normal.      Mouth/Throat:      Mouth: Mucous membranes are moist.      Pharynx: Oropharynx is clear.   Eyes:      Pupils: Pupils are equal, round, and reactive to light.   Cardiovascular:      Rate and Rhythm: Normal rate and regular rhythm.      Pulses: Normal pulses.   Pulmonary:      Effort: Pulmonary effort is normal.      Comments: Orally intubated on mechanical ventilation  Abdominal:      General: Abdomen is flat. There is no distension.      Palpations: Abdomen is soft.      Tenderness: There is no abdominal tenderness.   Musculoskeletal:         General: No swelling.      Cervical back: Neck supple.   Skin:     General: Skin is warm and dry.      Capillary Refill: Capillary refill takes less than 2 seconds.   Neurological:   General: well developed, well nourished, no distress.   Head: normocephalic, atraumatic  Mental Status: Awake, Alert, Oriented x 4  Speech: Mild dysarthria, no aphasia  Cranial nerves: Mild L facial drrop, CN II-XII otherwise grossly intact.   Eyes: pupils equal, round, reactive  to light, EOMI.  Sensory: intact to light touch throughout    Motor Strength: Moves all extremities spontaneously with good tone.  Full strength upper and lower extremities. No abnormal movements seen.     Pronator Drift: no drift noted  Finger-to-nose: Intact bilaterally     Medications:  Continuous Scheduledartificial tears, 2 drop, QID  dexAMETHasone injection, 4 mg, Q6H  famotidine (PF), 20 mg, Q12H  mupirocin, , BID  polyethylene glycol, 17 g, Daily  senna-docusate, 1 tablet, BID  white petrolatum-mineral oiL, , QHS    PRNacetaminophen, 650 mg, Q6H PRN  hydrALAZINE, 10 mg, Q4H PRN  labetalol, 10 mg, Q4H PRN  magnesium oxide, 800 mg, PRN  magnesium oxide, 800 mg, PRN  oxyCODONE, 10 mg, Q4H PRN  oxyCODONE, 5 mg, Q4H PRN  potassium bicarbonate, 35 mEq, PRN  potassium bicarbonate, 50 mEq, PRN  potassium bicarbonate, 60 mEq, PRN  potassium, sodium phosphates, 2 packet, PRN  potassium, sodium phosphates, 2 packet, PRN  potassium, sodium phosphates, 2 packet, PRN  promethazine, 25 mg, Q6H PRN  sodium chloride 0.9%, 10 mL, PRN      Today I personally reviewed pertinent medications, laboratory results, notably:    Diet  Diet Soft & Bite Sized (IDDSI Level 6) Thin  Diet Soft & Bite Sized (IDDSI Level 6) Thin

## 2025-05-10 NOTE — PROGRESS NOTES
[facility]  Otorhinolaryngology-Head & Neck Surgery  Progress Note    Subjective:     Post-Op Info:  Procedure(s) (LRB):  CRANIOTOMY, TRANSLABYRINTHINE APPROACH, WITH SURGICAL REMOVAL OF ACOUSTIC NEUROMA (Left)  CRANIOTOMY, TRANSLABYRINTHINE APPROACH, WITH SURGICAL REMOVAL OF ACOUSTIC NEUROMA, Fat graft (Left)   1 Day Post-Op  Hospital Day: 2     Interval History: POD1. Remained intubated overnight. No issues overnight. Patient denies vertigo.     Medications:  Continuous Infusions:   nicardipine  0-15 mg/hr Intravenous Continuous   Stopped at 05/09/25 1801     Scheduled Meds:   dexAMETHasone injection  4 mg Intravenous Q6H    famotidine (PF)  20 mg Intravenous Q12H    mupirocin   Nasal BID    [START ON 5/10/2025] polyethylene glycol  17 g Oral Daily    senna-docusate  1 tablet Oral BID     PRN Meds:  Current Facility-Administered Medications:     acetaminophen, 650 mg, Oral, Q6H PRN    dextrose 50%, 12.5 g, Intravenous, PRN    glucagon (human recombinant), 1 mg, Intramuscular, PRN    hydrALAZINE, 10 mg, Intravenous, Q4H PRN    insulin aspart U-100, 0-5 Units, Subcutaneous, Q6H PRN    labetalol, 10 mg, Intravenous, Q4H PRN    magnesium oxide, 800 mg, Oral, PRN    magnesium oxide, 800 mg, Oral, PRN    oxyCODONE, 10 mg, Oral, Q4H PRN    oxyCODONE, 5 mg, Oral, Q4H PRN    potassium bicarbonate, 35 mEq, Oral, PRN    potassium bicarbonate, 50 mEq, Oral, PRN    potassium bicarbonate, 60 mEq, Oral, PRN    potassium, sodium phosphates, 2 packet, Oral, PRN    potassium, sodium phosphates, 2 packet, Oral, PRN    potassium, sodium phosphates, 2 packet, Oral, PRN    prochlorperazine, 5 mg, Intravenous, Q6H PRN    promethazine (PHENERGAN) 12.5 mg in 0.9% NaCl 50 mL IVPB, 12.5 mg, Intravenous, Q6H PRN    sodium chloride 0.9%, 10 mL, Intravenous, PRN     Review of patient's allergies indicates:   Allergen Reactions    Zofran [ondansetron hcl]      Constipation     Objective:     Vital Signs (24h Range):  Temp:   [98.3 °F (36.8 °C)-100.4 °F (38 °C)] 98.7 °F (37.1 °C)  Pulse:  [] 120  Resp:  [14-37] 18  SpO2:  [92 %-100 %] 95 %  BP: (101-167)/(54-83) 142/68  Arterial Line BP: ()/() 128/91     Date 05/09/25 0700 - 05/10/25 0659   Shift 6882-1234 9352-3158 6405-8030 24 Hour Total   INTAKE   P.O.  200  200   I.V.(mL/kg) 114.5(1.5) 41.9(0.6)  156.4(2.1)   IV Piggyback 1958.1 124.6  2082.7   Shift Total(mL/kg) 2072.6(27.7) 366.5(4.9)  2439.1(32.6)   OUTPUT   Urine(mL/kg/hr)  100(0.2)  100   Shift Total(mL/kg)  100(1.3)  100(1.3)   Weight (kg) 74.8 74.8 74.8 74.8     Lines/Drains/Airways       Drain  Duration             Female External Urinary Catheter w/ Suction 05/09/25 2141 <1 day              Peripheral Intravenous Line  Duration                  Peripheral IV - Single Lumen 05/08/25 0603 18 G Anterior;Right Forearm 1 day         Peripheral IV - Single Lumen 05/08/25 0744 18 G Right Wrist 1 day         Peripheral IV - Single Lumen 05/08/25 0759 18 G Left Hand 1 day                     Physical Exam  Intubated, awake  Follows commands  PERRL, HB 1/6 on left, 5/6 on right  Incision CDI       Significant Labs:  CBC:   Recent Labs   Lab 05/09/25  0238   WBC 17.16*   RBC 3.71*   HGB 11.6*   HCT 34.6*      MCV 93   MCH 31.3*   MCHC 33.5     CMP:   Recent Labs   Lab 05/09/25  0238   *   CALCIUM 8.1*   ALBUMIN 3.0*   PROT 5.8*      K 3.4*   CO2 16*   *   BUN 4*   CREATININE 0.7   ALKPHOS 51   ALT 7*   AST 12   BILITOT 0.3       Significant Diagnostics:  CT: I have reviewed all pertinent results/findings within the past 24 hours and my personal findings are:  expected post op changes  Assessment/Plan:     * Acoustic neuroma  48 yo female s/p left translab craniotomy for acoustic neuroma resection on 5/8     - Admitted ICU postop  - Q1h neurochecks  - Pain control, nausea control  - PTOT OOB  - Eye care   - Lacrolube with eye taping at night   - Refresh eye drops ad kaya during the day  -  Remainder of care per Neuro ICU        Iman Schwartz MD  Otorhinolaryngology-Head & Neck Surgery  [facility]

## 2025-05-10 NOTE — PROGRESS NOTES
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 She had difficulty eating and reported a chemical and salty taste that started in November. Recently, her main complaint was cyclic vomiting up to 3 times a day which was associated with headaches. Preoperatively, she denied any facial numbness but noted that the left eye intermittently irritates her, and she had no obvious facial droop and no swallowing issues. During the surgery, she received 1 PRBC and 5L of fluids. The procedure lasted around 17 hours, so the decision was made to leave her intubated with the plans to wean for extubation in the morning. Postop CT head and MRI pending. Postop, she is admitted to [facility] for close neurologic monitoring.     Hospital Course: 5/10: NAEO. Dysarthria improving.Tolerating room air post extubation. Denies any vertigo. Did well with PT/OT. SBP goal liberalized to less than 150. Exam stable.     Interval History:  5/10: NAEO. Dysarthria improving.Tolerating room air post extubation. Denies any vertigo. Did well with PT/OT. SBP goal liberalized to less than 150. Exam stable.     Objective:     Vitals:  Temp: 98.6 °F (37 °C)  Pulse: 110  Rhythm: sinus tachycardia  BP: (!) 146/96  MAP (mmHg): 116  Resp: (!) 28  SpO2: 95 %    Temp  Min: 98 °F (36.7 °C)  Max: 98.9 °F (37.2 °C)  Pulse  Min: 75  Max: 130  BP  Min: 101/56  Max: 181/81  MAP (mmHg)  Min: 74  Max: 116  Resp  Min: 12  Max: 29  SpO2  Min: 92 %  Max: 99 %    05/09 0701 - 05/10 0700  In: 2439.1 [P.O.:200; I.V.:156.4]  Out: 700 [Urine:700]   Unmeasured Output  Unmeasured Urine Occurrence: 1        Physical  Exam  Vitals and nursing note reviewed.   Constitutional:       General: She is not in acute distress.     Comments: Sedated. Resting comfortably in bed on mechanical ventilation.   HENT:      Ears:      Comments: L ear cover in place     Nose: Nose normal.      Mouth/Throat:      Mouth: Mucous membranes are moist.      Pharynx: Oropharynx is clear.   Eyes:      Pupils: Pupils are equal, round, and reactive to light.   Cardiovascular:      Rate and Rhythm: Normal rate and regular rhythm.      Pulses: Normal pulses.   Pulmonary:      Effort: Pulmonary effort is normal.      Comments: Orally intubated on mechanical ventilation  Abdominal:      General: Abdomen is flat. There is no distension.      Palpations: Abdomen is soft.      Tenderness: There is no abdominal tenderness.   Musculoskeletal:         General: No swelling.      Cervical back: Neck supple.   Skin:     General: Skin is warm and dry.      Capillary Refill: Capillary refill takes less than 2 seconds.   Neurological:   General: well developed, well nourished, no distress.   Head: normocephalic, atraumatic  Mental Status: Awake, Alert, Oriented x 4  Speech: Mild dysarthria, no aphasia  Cranial nerves: Mild L facial drrop, CN II-XII otherwise grossly intact.   Eyes: pupils equal, round, reactive to light, EOMI.  Sensory: intact to light touch throughout    Motor Strength: Moves all extremities spontaneously with good tone.  Full strength upper and lower extremities. No abnormal movements seen.     Pronator Drift: no drift noted  Finger-to-nose: Intact bilaterally     Medications:  Continuous Scheduledartificial tears, 2 drop, QID  dexAMETHasone injection, 4 mg, Q6H  famotidine (PF), 20 mg, Q12H  mupirocin, , BID  polyethylene glycol, 17 g, Daily  senna-docusate, 1 tablet, BID  white petrolatum-mineral oiL, , QHS    PRNacetaminophen, 650 mg, Q6H PRN  hydrALAZINE, 10 mg, Q4H PRN  labetalol, 10 mg, Q4H PRN  magnesium oxide, 800 mg, PRN  magnesium oxide, 800  mg, PRN  oxyCODONE, 10 mg, Q4H PRN  oxyCODONE, 5 mg, Q4H PRN  potassium bicarbonate, 35 mEq, PRN  potassium bicarbonate, 50 mEq, PRN  potassium bicarbonate, 60 mEq, PRN  potassium, sodium phosphates, 2 packet, PRN  potassium, sodium phosphates, 2 packet, PRN  potassium, sodium phosphates, 2 packet, PRN  promethazine, 25 mg, Q6H PRN  sodium chloride 0.9%, 10 mL, PRN      Today I personally reviewed pertinent medications, laboratory results, notably:    Diet  Diet Soft & Bite Sized (IDDSI Level 6) Thin  Diet Soft & Bite Sized (IDDSI Level 6) Thin        Assessment/Plan:     ENT  * Acoustic neuroma  50 y/o F w/ PMH of pSVT (no home medications, resolved many years ago) and L acoustic neuroma (diagnosed 2019) who presents to [facility] s/p L translabyrinthine approach for resection of L acoustic neuroma on 5/8 with NSGY and ENT.    Admitted to [facility]  Q1h neuro checks, I&Os, and vitals   CBC, CMP, mag, phos daily   PT/INR/PTT/LA pending  Postop CT head without contrast and MRI with and without contrast stable  Dex 4 Q6   H2B  Ancef  NSGY following   SBP <140  Prn labetalol, hydralazine   Cardene, wean as tolerated  PRN pain/nausea regimen   NPO  SCDs; hold chemical VTE ppx in acute setting   PT/OT    Pulmonary  On mechanically assisted ventilation  Extubated 5/9, tolerating room air          The patient is being Prophylaxed for:  Venous Thromboembolism with: Mechanical  Stress Ulcer with: H2B  Ventilator Pneumonia with: not applicable    Activity Orders            Diet Soft & Bite Sized (IDDSI Level 6) Thin: Soft & Bite Sized starting at 05/09 1407    Elevate HOB 30 starting at 05/09 0106    Turn patient starting at 05/09 0000          Full Code    Lizzy Edmonds PA-C  Neurocritical Care  [facility] Neuro Critical [facility]

## 2025-05-10 NOTE — SUBJECTIVE & OBJECTIVE
Interval History: Reports doing very well overall. No vertigo. Did well with PTOT    Medications:  Continuous Infusions:   nicardipine  0-15 mg/hr Intravenous Continuous   Stopped at 05/09/25 1801     Scheduled Meds:   artificial tears  2 drop Left Eye QID    dexAMETHasone injection  4 mg Intravenous Q6H    famotidine (PF)  20 mg Intravenous Q12H    mupirocin   Nasal BID    polyethylene glycol  17 g Oral Daily    senna-docusate  1 tablet Oral BID    white petrolatum-mineral oiL   Left Eye QHS     PRN Meds:  Current Facility-Administered Medications:     acetaminophen, 650 mg, Oral, Q6H PRN    dextrose 50%, 12.5 g, Intravenous, PRN    glucagon (human recombinant), 1 mg, Intramuscular, PRN    hydrALAZINE, 10 mg, Intravenous, Q4H PRN    insulin aspart U-100, 0-5 Units, Subcutaneous, Q6H PRN    labetalol, 10 mg, Intravenous, Q4H PRN    magnesium oxide, 800 mg, Oral, PRN    magnesium oxide, 800 mg, Oral, PRN    oxyCODONE, 10 mg, Oral, Q4H PRN    oxyCODONE, 5 mg, Oral, Q4H PRN    potassium bicarbonate, 35 mEq, Oral, PRN    potassium bicarbonate, 50 mEq, Oral, PRN    potassium bicarbonate, 60 mEq, Oral, PRN    potassium, sodium phosphates, 2 packet, Oral, PRN    potassium, sodium phosphates, 2 packet, Oral, PRN    potassium, sodium phosphates, 2 packet, Oral, PRN    prochlorperazine, 5 mg, Intravenous, Q6H PRN    promethazine (PHENERGAN) 12.5 mg in 0.9% NaCl 50 mL IVPB, 12.5 mg, Intravenous, Q6H PRN    sodium chloride 0.9%, 10 mL, Intravenous, PRN     Review of patient's allergies indicates:   Allergen Reactions    Zofran [ondansetron hcl]      Constipation     Objective:     Vital Signs (24h Range):  Temp:  [98 °F (36.7 °C)-98.9 °F (37.2 °C)] 98.2 °F (36.8 °C)  Pulse:  [] 116  Resp:  [12-37] 19  SpO2:  [92 %-100 %] 98 %  BP: (101-181)/(54-82) 111/55     Date 05/10/25 0700 - 05/11/25 0659   Shift 8543-6031 4658-2974 6064-3139 24 Hour Total   INTAKE   P.O. 200   200   Shift Total(mL/kg) 200(2.7)   200(2.7)   OUTPUT    Shift Total(mL/kg)       Weight (kg) 74.8 74.8 74.8 74.8     Lines/Drains/Airways       Drain  Duration             Female External Urinary Catheter w/ Suction 05/09/25 2141 <1 day              Peripheral Intravenous Line  Duration                  Peripheral IV - Single Lumen 05/08/25 0603 18 G Anterior;Right Forearm 2 days         Peripheral IV - Single Lumen 05/08/25 0744 18 G Right Wrist 2 days         Peripheral IV - Single Lumen 05/08/25 0759 18 G Left Hand 2 days         Peripheral IV - Single Lumen 05/10/25 0321 20 G 1 3/4 in Anterior;Left Forearm <1 day                     Physical Exam     NAD  HB-6 on left  Sanders in place over left ear    Significant Labs:  Recent Lab Results  (Last 5 results in the past 24 hours)        05/10/25  0248   05/10/25  0245   05/10/25  0227   05/09/25  2101   05/09/25  1711        Albumin   3.4             ALP   68             ALT   8             Anion Gap   11             AST   18             Baso #   0.05             Basophil %   0.2             BILIRUBIN TOTAL   0.6  Comment: For infants and newborns, interpretation of results should be based   on gestational age, weight and in agreement with clinical   observations.    Premature Infant recommended reference ranges:   0-24 hours:  <8.0 mg/dL   24-48 hours: <12.0 mg/dL   3-5 days:    <15.0 mg/dL   6-29 days:   <15.0 mg/dL             BUN   4             Calcium   8.6             Chloride   111             CO2   21             Creatinine   0.6             eGFR   >60  Comment: Estimated GFR calculated using the CKD-EPI creatinine (2021) equation.             Eos #   0.00             Eos %   0.0             Glucose   144             Gran # (ANC)   19.42             Hematocrit   33.0             Hemoglobin   11.3             Immature Grans (Abs)   0.16  Comment: Mild elevation in immature granulocytes is non specific and can be seen in a variety of conditions including stress response, acute inflammation, trauma and  pregnancy. Correlation with other laboratory and clinical findings is essential.             Immature Granulocytes   0.7             Lactic Acid Level     2.7           Lymph #   1.58             Lymph %   7.2             Magnesium    2.0             MCH   31.3             MCHC   34.2             MCV   91             Mono #   0.62             Mono %   2.8             MPV   10.8             Neut %   89.1             nRBC   0             Phosphorus Level   3.0             Platelet Count   247  Comment: This result was previously suppressed from the chart.             POCT Glucose 143       142   181       Potassium   3.9             PROTEIN TOTAL   6.5             RBC   3.61             RDW   13.2             Sodium   143             WBC   21.83                                    Significant Diagnostics:  I have reviewed and interpreted all pertinent imaging results/findings within the past 24 hours.

## 2025-05-10 NOTE — PLAN OF CARE
"[facility] Care Plan    POC reviewed with Bibiana Go and family at 0300. Patient and Family verbalized understanding. Questions and concerns addressed. No acute events today. Pt progressing toward goals. Will continue to monitor. See below and flowsheets for full assessment and VS info.       - Labatolol x2  -Hydralazine x 1  - Oxy x 1  - Tylenol x1  -Phenergan x1  - New PIV to L FA      Is this a stroke patient? no    Neuro:  Oakley Coma Scale  Best Eye Response: 4-->(E4) spontaneous  Best Motor Response: 6-->(M6) obeys commands  Best Verbal Response: 5-->(V5) oriented  Oakley Coma Scale Score: 15  Pupil PERRLA: yes     24 hr Temp:  [98 °F (36.7 °C)-99.9 °F (37.7 °C)]     CV:   Rhythm: normal sinus rhythm  BP goals:   SBP < 140  MAP > 65    Resp:      Vent Mode: Spont  Set Rate: 0 BPM  Oxygen Concentration (%): 40  Vt Set: 0 mL  PEEP/CPAP: 5 cmH20  Pressure Support: 5 cmH20    Plan: N/A    GI/:     Diet/Nutrition Received: mechanical/dental soft  Last Bowel Movement: 05/07/25  Voiding Characteristics: frequency    Intake/Output Summary (Last 24 hours) at 5/10/2025 0537  Last data filed at 5/9/2025 2301  Gross per 24 hour   Intake 2473.52 ml   Output 525 ml   Net 1948.52 ml     Unmeasured Output  Unmeasured Urine Occurrence: 1    Labs/Accuchecks:  Recent Labs   Lab 05/10/25  0245   WBC 21.83*   RBC 3.61*   HGB 11.3*   HCT 33.0*         Recent Labs   Lab 05/10/25  0245      K 3.9   CO2 21*   *   BUN 4*   CREATININE 0.6   ALKPHOS 68   ALT 8*   AST 18   BILITOT 0.6      Recent Labs   Lab 05/09/25  0238   PROTIME 10.6   INR 1.0   APTT 23.4    No results for input(s): "CPK", "CPKMB", "TROPONINI", "MB" in the last 168 hours.    Electrolytes: N/A - electrolytes WDL  Accuchecks: Q6H    Gtts:   nicardipine  0-15 mg/hr Intravenous Continuous   Stopped at 05/09/25 1801       LDA/Wounds:    Joshua Risk Assessment  Sensory Perception: 3-->slightly limited  Moisture: 4-->rarely moist  Activity: 3-->walks " occasionally  Mobility: 3-->slightly limited  Nutrition: 2-->probably inadequate  Friction and Shear: 2-->potential problem  Joshua Score: 17  Is your joshua score 12 or less? no          Restraints:   Restraint Order  Length of Order: Order good for next 24 hours or when removed.  Date that the current order will : 05/10/25  Time that the current order will : 0458  Order Upon Application: Yes    [facility]

## 2025-05-11 ENCOUNTER — PATIENT MESSAGE (OUTPATIENT)
Dept: NEUROSURGERY | Facility: CLINIC | Age: 50
End: 2025-05-11
Payer: COMMERCIAL

## 2025-05-11 LAB
ABO + RH BLD: NORMAL
ABO + RH BLD: NORMAL
BLD PROD TYP BPU: NORMAL
BLD PROD TYP BPU: NORMAL
BLOOD UNIT EXPIRATION DATE: NORMAL
BLOOD UNIT EXPIRATION DATE: NORMAL
BLOOD UNIT TYPE CODE: 5100
BLOOD UNIT TYPE CODE: 5100
CROSSMATCH INTERPRETATION: NORMAL
CROSSMATCH INTERPRETATION: NORMAL
DISPENSE STATUS: NORMAL
DISPENSE STATUS: NORMAL
OHS QRS DURATION: 90 MS
OHS QTC CALCULATION: 441 MS
UNIT NUMBER: NORMAL
UNIT NUMBER: NORMAL

## 2025-05-11 NOTE — PROGRESS NOTES
"[facility] - Neuro Critical Care  Neurosurgery  Progress Note    Subjective:     History of Present Illness: Pt presents for elective translab craniotomy for left acoustic neuroma resection    Post-Op Info:  Procedure(s) (LRB):  CRANIOTOMY, TRANSLABYRINTHINE APPROACH, WITH SURGICAL REMOVAL OF ACOUSTIC NEUROMA (Left)  CRANIOTOMY, TRANSLABYRINTHINE APPROACH, WITH SURGICAL REMOVAL OF ACOUSTIC NEUROMA, Fat graft (Left)   3 Days Post-Op   Interval History: 5/10: MRI with good resection. Pt ambulating well, no PT/OT needs. HB5 facial palsy this AM. Steroids to off over 1 week. Eye drops at least 4 times daily, ointment and tape prior to bed. Will set up with ophtho outpt for consideration of gold weight.     Medications:  Continuous Infusions:  Scheduled Meds:  PRN Meds:     Review of Systems  Objective:     Weight: 74.8 kg (164 lb 14.5 oz)  Body mass index is 29.21 kg/m².  Vital Signs (Most Recent):  Temp: 98.6 °F (37 °C) (05/10/25 1501)  Pulse: (!) 119 (05/10/25 1701)  Resp: (!) 23 (05/10/25 1701)  BP: 115/78 (05/10/25 1701)  SpO2: (!) 94 % (05/10/25 1701) Vital Signs (24h Range):  Temp:  [98.2 °F (36.8 °C)-98.8 °F (37.1 °C)] 98.6 °F (37 °C)  Pulse:  [] 119  Resp:  [14-29] 23  SpO2:  [92 %-98 %] 94 %  BP: (106-164)/() 115/78     Date 05/10/25 0700 - 05/11/25 0659(Discharged)   Shift 0081-9323 7553-9521 3980-3553 24 Hour Total   INTAKE   P.O. 200 650  850   Shift Total(mL/kg) 200(2.7) 650(8.7)  850(11.4)   OUTPUT   Shift Total(mL/kg)       Weight (kg) 74.8 74.8 74.8 74.8                               Physical Exam         Neurosurgery Physical Exam    [id number]  PERRL, EOMI  L HB5 facial palsy  SILT  FC x4 full strength  No drift        Significant Labs:  Recent Labs   Lab 05/10/25  0245   *      K 3.9   *   CO2 21*   BUN 4*   CREATININE 0.6   CALCIUM 8.6*   MG 2.0     Recent Labs   Lab 05/10/25  0245   WBC 21.83*   HGB 11.3*   HCT 33.0*        No results for input(s): "LABPT", "INR", " ""APTT" in the last 48 hours.  Microbiology Results (last 7 days)       ** No results found for the last 168 hours. **          All pertinent labs from the last 24 hours have been reviewed.    Significant Diagnostics:  CT: No results found in the last 24 hours.  MRI: No results found in the last 24 hours.  Assessment/Plan:     * Acoustic neuroma  Pt is s/p left translab craniotomy for acoustic neuroma resection on 5/8    MRI w/wo with good resection, no significant stroke    Plan:  Admitted ICU postop, okay for d/c home  SBP<160  Dex 4q6 - taper to off over 1 week  Pain control, nausea control  Monitor facial function - will refer to ophtho outpt for gold weight consideration  Recommend tear drops QID, ointment and taping eye shut prior to sleeping  PT/OT - no needs     Discussed with Dr. Flex Hodge MD  Neurosurgery  [facility] - Neuro Critical Care  "

## 2025-05-11 NOTE — ASSESSMENT & PLAN NOTE
Pt is s/p left translab craniotomy for acoustic neuroma resection on 5/8    MRI w/wo with good resection, no significant stroke    Plan:  Admitted ICU postop, okay for d/c home  SBP<160  Dex 4q6 - taper to off over 1 week  Pain control, nausea control  Monitor facial function - will refer to ophtho outpt for gold weight consideration  Recommend tear drops QID, ointment and taping eye shut prior to sleeping  PT/OT - no needs     Discussed with Dr. Mccord

## 2025-05-11 NOTE — SUBJECTIVE & OBJECTIVE
"Interval History: 5/10: MRI with good resection. Pt ambulating well, no PT/OT needs. HB5 facial palsy this AM. Steroids to off over 1 week. Eye drops at least 4 times daily, ointment and tape prior to bed. Will set up with ophtho outpt for consideration of gold weight.     Medications:  Continuous Infusions:  Scheduled Meds:  PRN Meds:     Review of Systems  Objective:     Weight: 74.8 kg (164 lb 14.5 oz)  Body mass index is 29.21 kg/m².  Vital Signs (Most Recent):  Temp: 98.6 °F (37 °C) (05/10/25 1501)  Pulse: (!) 119 (05/10/25 1701)  Resp: (!) 23 (05/10/25 1701)  BP: 115/78 (05/10/25 1701)  SpO2: (!) 94 % (05/10/25 1701) Vital Signs (24h Range):  Temp:  [98.2 °F (36.8 °C)-98.8 °F (37.1 °C)] 98.6 °F (37 °C)  Pulse:  [] 119  Resp:  [14-29] 23  SpO2:  [92 %-98 %] 94 %  BP: (106-164)/() 115/78     Date 05/10/25 0700 - 05/11/25 0659(Discharged)   Shift 3635-8083 2345-1776 7336-6016 24 Hour Total   INTAKE   P.O. 200 650  850   Shift Total(mL/kg) 200(2.7) 650(8.7)  850(11.4)   OUTPUT   Shift Total(mL/kg)       Weight (kg) 74.8 74.8 74.8 74.8                               Physical Exam         Neurosurgery Physical Exam    E3V5M6  PERRL, EOMI  L HB5 facial palsy  SILT  FC x4 full strength  No drift        Significant Labs:  Recent Labs   Lab 05/10/25  0245   *      K 3.9   *   CO2 21*   BUN 4*   CREATININE 0.6   CALCIUM 8.6*   MG 2.0     Recent Labs   Lab 05/10/25  0245   WBC 21.83*   HGB 11.3*   HCT 33.0*        No results for input(s): "LABPT", "INR", "APTT" in the last 48 hours.  Microbiology Results (last 7 days)       ** No results found for the last 168 hours. **          All pertinent labs from the last 24 hours have been reviewed.    Significant Diagnostics:  CT: No results found in the last 24 hours.  MRI: No results found in the last 24 hours.  "

## 2025-05-12 ENCOUNTER — TELEPHONE (OUTPATIENT)
Dept: OTOLARYNGOLOGY | Facility: CLINIC | Age: 50
End: 2025-05-12
Payer: COMMERCIAL

## 2025-05-12 NOTE — PLAN OF CARE
[facility] Neuro Critical Care  Discharge Final Note    Primary Care Provider: Nirmala Ji MD    Expected Discharge Date: 5/10/2025    Final Discharge Note (most recent)       Final Note - 05/12/25 0833          Final Note    Assessment Type Final Discharge Note (P)      Anticipated Discharge Disposition Home or Self Care (P)         Post-Acute Status    Coverage [employer] (P)      Discharge Delays None known at this time (P)                      Important Message from Medicare         Pt discharged home with family, follow up appt noted on AVS for ENT. No further info given.  Discharge Plan A and Plan B have been determined by review of patient's clinical status, future medical and therapeutic needs, and coverage/benefits for post-acute care in coordination with multidisciplinary team members.   Future Appointments   Date Time Provider Department Center   5/20/2025  1:00 PM Dean Varela MD [facility] ENT [facility], MSN   [facility]  [phone]

## 2025-05-13 ENCOUNTER — PATIENT MESSAGE (OUTPATIENT)
Dept: OTOLARYNGOLOGY | Facility: CLINIC | Age: 50
End: 2025-05-13
Payer: COMMERCIAL

## 2025-05-13 LAB
DHEA SERPL-MCNC: NORMAL
ESTROGEN SERPL-MCNC: NORMAL PG/ML
INSULIN SERPL-ACNC: NORMAL U[IU]/ML
LAB AP CLINICAL INFORMATION: NORMAL
LAB AP GROSS DESCRIPTION: NORMAL
LAB AP PERFORMING LOCATION(S): NORMAL
LAB AP REPORT FOOTNOTES: NORMAL
T3RU NFR SERPL: NORMAL %

## 2025-05-15 ENCOUNTER — NURSE TRIAGE (OUTPATIENT)
Dept: ADMINISTRATIVE | Facility: CLINIC | Age: 50
End: 2025-05-15
Payer: COMMERCIAL

## 2025-05-16 NOTE — TELEPHONE ENCOUNTER
Pt's  calling and he is on the way to the ED with wife in the ambulance as she was doing a cat bath and fainted and he was unsure   If she hit her head but had just had surgery recently and I will route message to provider he was just calling to let them know.             Reason for Disposition   [1] Caller requesting NON-URGENT health information AND [2] PCP's office is the best resource    Protocols used: Information Only Call - No Triage-A-

## 2025-05-20 ENCOUNTER — OFFICE VISIT (OUTPATIENT)
Dept: NEUROSURGERY | Facility: CLINIC | Age: 50
End: 2025-05-20
Payer: COMMERCIAL

## 2025-05-20 ENCOUNTER — OFFICE VISIT (OUTPATIENT)
Dept: OTOLARYNGOLOGY | Facility: CLINIC | Age: 50
End: 2025-05-20
Payer: COMMERCIAL

## 2025-05-20 DIAGNOSIS — D33.3 UNILATERAL VESTIBULAR SCHWANNOMA: Primary | ICD-10-CM

## 2025-05-20 DIAGNOSIS — Z09 POSTOPERATIVE EXAMINATION: ICD-10-CM

## 2025-05-20 DIAGNOSIS — D33.3 ACOUSTIC NEUROMA: Primary | ICD-10-CM

## 2025-05-20 DIAGNOSIS — Z98.890 S/P CRANIOTOMY: ICD-10-CM

## 2025-05-20 PROCEDURE — 99999 PR PBB SHADOW E&M-EST. PATIENT-LVL II: CPT | Mod: PBBFAC,,, | Performed by: NEUROLOGICAL SURGERY

## 2025-05-20 PROCEDURE — 99024 POSTOP FOLLOW-UP VISIT: CPT | Mod: S$GLB,,, | Performed by: OTOLARYNGOLOGY

## 2025-05-20 PROCEDURE — 99024 POSTOP FOLLOW-UP VISIT: CPT | Mod: S$GLB,,, | Performed by: NEUROLOGICAL SURGERY

## 2025-05-20 PROCEDURE — 1159F MED LIST DOCD IN RCRD: CPT | Mod: CPTII,S$GLB,, | Performed by: NEUROLOGICAL SURGERY

## 2025-05-20 PROCEDURE — 99999 PR PBB SHADOW E&M-EST. PATIENT-LVL III: CPT | Mod: PBBFAC,,, | Performed by: OTOLARYNGOLOGY

## 2025-05-20 PROCEDURE — 1160F RVW MEDS BY RX/DR IN RCRD: CPT | Mod: CPTII,S$GLB,, | Performed by: NEUROLOGICAL SURGERY

## 2025-05-20 PROCEDURE — 3044F HG A1C LEVEL LT 7.0%: CPT | Mod: CPTII,S$GLB,, | Performed by: NEUROLOGICAL SURGERY

## 2025-05-20 PROCEDURE — 3044F HG A1C LEVEL LT 7.0%: CPT | Mod: CPTII,S$GLB,, | Performed by: OTOLARYNGOLOGY

## 2025-05-20 NOTE — PROGRESS NOTES
CHIEF COMPLAINT:  Left vestibular schwannoma s/p translab (2wks)    INTERVAL HISTORY (5/20/25):  2wks s/p left translab for resection of giant vestibular schwannoma on 5/8/25 with Dr Varela.  With exception of known left facial weakness, she is doing well - her persistent daily vomiting has stopped and she reports less imbalance.  Her wound has healed well.  She reports intermittent nasal drainage and salty taste in her throat (present preop).  Overall, she is satisfied with outcome and doing well.      She is taking good care of her left eye with ointment/drops and taping at night.  She gets some light sensitivity when around bright lights.  Left facial sensation improving.    HPI:  Bibiana Go is a 50 y.o.  female with below PMH, who is referred for evaluation of large left vestibular schwannoma.  Tumor was discovered in 2019 after developing hearing loss.  It was small at the time and she chose to observe however the tumor has grown.  She had an episode of vertigo in February but was told it was originating from right side and it responded to Epley maneuver.    Her main complaint is cyclic vomiting up to 3 times a day.  She has difficulty eating and reports a chemical and salty taste that started in November.  She also reports headaches.  Sunday she is unable to function and carry out her daily activities.    She denies any facial numbness but notes that the left eye intermittently irritates her.  She has no obvious facial droop and no swallowing issues.    She is scheduled for a left-sided translabyrinthine approach for resection with Dr. Varela on May 8th.    Review of patient's allergies indicates:   Allergen Reactions    Zofran [ondansetron hcl]      Constipation       Past Medical History:   Diagnosis Date    Acoustic neuroma     Asthma     as a child    History of PSVT (paroxysmal supraventricular tachycardia)     no longer needs medication     Past Surgical History:   Procedure Laterality Date      SECTION       SECTION WITH TUBAL LIGATION      CRANIOTOMY, TRANSLABYRINTHINE APPROACH, WITH SURGICAL REMOVAL OF ACOUSTIC NEUROMA Left 2025    Procedure: CRANIOTOMY, TRANSLABYRINTHINE APPROACH, WITH SURGICAL REMOVAL OF ACOUSTIC NEUROMA;  Surgeon: Farzad Mccord DO;  Location: [facility] OR [address] [place] [address];  Service: Neurosurgery;  Laterality: Left;    CRANIOTOMY, TRANSLABYRINTHINE APPROACH, WITH SURGICAL REMOVAL OF ACOUSTIC NEUROMA Left 2025    Procedure: CRANIOTOMY, TRANSLABYRINTHINE APPROACH, WITH SURGICAL REMOVAL OF ACOUSTIC NEUROMA, Fat graft;  Surgeon: Dean Varela MD;  [facility];  Service: ENT;  Laterality: Left;  Fat graft    CYSTOSCOPY N/A 2019    Procedure: CYSTOSCOPY;  Surgeon: Francisco Javier Bales MD;  Location: [facility];  Service: OB/GYN;  Laterality: N/A;    HYSTERECTOMY  2019    OOPHORECTOMY  2019    one removed w/Hysterectomy    ROBOT-ASSISTED LAPAROSCOPIC HYSTERECTOMY N/A 2019    Procedure: ROBOTIC HYSTERECTOMY;  Surgeon: Francisco Javier Bales MD;  Location: [facility] OR;  Service: OB/GYN;  Laterality: N/A;    ROBOT-ASSISTED LAPAROSCOPIC OOPHORECTOMY Right 2019    Procedure: ROBOTIC OOPHORECTOMY;  Surgeon: Francisco Javier Bales MD;  Location: [facility] OR;  Service: OB/GYN;  Laterality: Right;    ROBOT-ASSISTED SALPINGECTOMY Bilateral 2019    Procedure: ROBOTIC SALPINGECTOMY;  Surgeon: Francisco Javier Bales MD;  Location: [facility] OR;  Service: OB/GYN;  Laterality: Bilateral;    TUBAL LIGATION       Family History   Problem Relation Name Age of Onset    Hyperlipidemia Mother      Kidney disease Father Dad     Cancer Father Dad     Diabetes Father Dad     Heart disease Father Dad     Breast cancer Maternal Aunt  45    Breast cancer Maternal Aunt Aunt Sheeba     Breast cancer Paternal Aunt  42    Breast cancer Paternal Aunt Aunt Rosario      Social History[1]     Review of Systems   Constitutional: Negative.    HENT:  Positive for hearing loss. Negative for congestion,  ear discharge, ear pain, nosebleeds, sinus pain and tinnitus.    Eyes: Negative.    Respiratory: Negative.     Cardiovascular:  Negative for chest pain, palpitations, claudication and leg swelling.   Gastrointestinal:  Negative for abdominal pain, blood in stool, constipation, diarrhea, melena and vomiting.   Genitourinary:  Negative for flank pain, frequency and urgency.   Musculoskeletal:  Negative for falls.   Skin: Negative.    Neurological:  Positive for weakness (Left face). Negative for dizziness, tingling, tremors, sensory change, speech change, focal weakness, seizures, loss of consciousness and headaches.   Endo/Heme/Allergies:  Does not bruise/bleed easily.   Psychiatric/Behavioral: Negative.         OBJECTIVE:   No obvious focal deficits  No obvious facial droop      Diagnostic Results:  All imaging was independently reviewed by me.    Postop MRI:  Small residual along brainstem    IAC brain, dated 3/26/25:  1. 3.9 x 3.1 cm left vestibular schwannoma  2. Mass effect on lateral medulla and cerebellum with distortion of 4th ventricle  3. No obvious hydrocephalus    ASSESSMENT/PLAN:     Problem List Items Addressed This Visit          Neuro    S/P craniotomy       ENT    Acoustic neuroma - Primary     2wks s/p left translab for resection of giant vestibular schwannoma.  NGTR achieved with small residual adherent to brainstem.  Had postop facial weakness (HB6) and still no appreciable movement.  Sensation is returning and eye looks healthy.  We will refer to Dr Hodge for alana sheridan.  She is very pleased that her daily vomiting has subsided.  She will need to maintain aggressive eye care.    - Vestibular PT  - RTC in 1m   - Referral to Dr Hodge for alana sheridan  - Cont eye care    The patient understands and agrees with the plan of care. All questions were answered.        .                 [1]   Social History  Tobacco Use    Smoking status: Never    Smokeless tobacco: Never   Substance Use Topics     Alcohol use: Yes     Alcohol/week: 1.0 standard drink of alcohol     Types: 1 Drinks containing 0.5 oz of alcohol per week     Comment: One drink about once a month    Drug use: Never

## 2025-05-23 ENCOUNTER — OFFICE VISIT (OUTPATIENT)
Dept: OTOLARYNGOLOGY | Facility: CLINIC | Age: 50
End: 2025-05-23
Payer: COMMERCIAL

## 2025-05-23 VITALS
SYSTOLIC BLOOD PRESSURE: 169 MMHG | HEART RATE: 125 BPM | HEIGHT: 63 IN | BODY MASS INDEX: 29.57 KG/M2 | DIASTOLIC BLOOD PRESSURE: 104 MMHG | WEIGHT: 166.88 LBS

## 2025-05-23 DIAGNOSIS — G51.0 FACIAL PARALYSIS: Primary | ICD-10-CM

## 2025-05-23 PROCEDURE — 99999 PR PBB SHADOW E&M-EST. PATIENT-LVL IV: CPT | Mod: PBBFAC,,, | Performed by: STUDENT IN AN ORGANIZED HEALTH CARE EDUCATION/TRAINING PROGRAM

## 2025-05-23 NOTE — PROGRESS NOTES
OTOLARYNGOLOGY- FACIAL PLASTIC & RECONSTRUCTIVE SURGERY      Bibiana Go  [id number]      HISTORY OF PRESENT ILLNESS: Bibiana Go  is a 50 y.o. female who was referred to me by Dr. Varela for evaluation of facial nerve paralysis s/p left translab approach acoustic neuroma resection with abdominal fat graft 25.      Preoperatively, she denied any facial numbness but noted that the left eye intermittently irritates her, and she had no obvious facial droop and no swallowing issues.     Patient's postoperative course was unremarkable. Patient discharged 5/10. Sutures removed from translab incision earlier this week by Dr. Varela.    Patient reports she is not able to use a straw but is able to eat most foods without difficulty. She tapes her eye at night but denies any ocular symptoms or visual changes. Patient's main complaint at the moment are intermittent sharp neuralgias. She reports that the the pain feels like a rug burn on her forehead most recently but the pain appears at different locations on her face intermittently.       Occupation/Family:  Lives in [place] with family  Works from home       Past Medical History  She has a past medical history of Acoustic neuroma, Asthma, and History of PSVT (paroxysmal supraventricular tachycardia).    Past Surgical History  She has a past surgical history that includes  section;  section with tubal ligation; Robot-assisted laparoscopic hysterectomy (N/A, 2019); Cystoscopy (N/A, 2019); Robot-assisted salpingectomy (Bilateral, 2019); Robot-assisted laparoscopic oophorectomy (Right, 2019); Hysterectomy (2019); Oophorectomy (2019); Tubal ligation (); craniotomy, translabyrinthine approach, with surgical removal of acoustic neuroma (Left, 2025); and craniotomy, translabyrinthine approach, with surgical removal of acoustic neuroma (Left, 2025).    Family History  Her family history includes Breast cancer in  her maternal aunt and paternal aunt; Breast cancer (age of onset: 42) in her paternal aunt; Breast cancer (age of onset: 45) in her maternal aunt; Cancer in her father; Diabetes in her father; Heart disease in her father; Hyperlipidemia in her mother; Kidney disease in her father.    Social History  She reports that she has never smoked. She has never used smokeless tobacco. She reports current alcohol use of about 1.0 standard drink of alcohol per week. She reports that she does not use drugs.    Allergies  She is allergic to zofran [ondansetron hcl].    Medications  She has a current medication list which includes the following prescription(s): artificial tears, famotidine, promethazine, and white petrolatum-mineral oil.      Review of Systems     Constitutional: Negative for appetite change, chills, fatigue, fever and unexpected weight loss.      HENT: Positive for facial swelling and hearing loss.      Eyes:  Negative for change in eyesight, eye drainage, eye itching and photophobia.     Respiratory:  Negative for cough, shortness of breath, sleep apnea, snoring and wheezing.      Cardiovascular:  Negative for chest pain, foot swelling, irregular heartbeat and swollen veins.     Gastrointestinal:  Negative for abdominal pain, acid reflux, constipation, diarrhea, heartburn and vomiting.     Genitourinary: Negative for difficulty urinating, sexual problems and frequent urination.     Musc: Negative for aching joints, aching muscles, back pain and neck pain.     Skin: Negative for rash.     Allergy: Negative for food allergies and seasonal allergies.     Endocrine: Negative for cold intolerance and heat intolerance.      Neurological: Negative for dizziness, headaches, light-headedness, seizures and tremors.      Hematologic: Negative for bruises/bleeds easily and swollen glands.      Psychiatric: Negative for decreased concentration, depression, nervous/anxious and sleep disturbance.              PHYSICAL  "EXAM:  BP (!) 169/104 (BP Location: Right arm, Patient Position: Sitting)   Pulse (!) 125   Ht 5' 3" (1.6 m)   Wt 75.7 kg (166 lb 14.2 oz)   LMP 06/18/2019 (Exact Date)   BMI 29.56 kg/m²     General: Alert and oriented in no acute distress  Head and Face: Normocephalic,   Nose: Anterior rhinoscopy reveals overall normal appearing turbinates, septum midline, no obvious lesions or masses  Neurological:  flaccid paralysis on the left side without evidence of movement.  There is poor Garcia's phenomenon with complete scleral show.  Diminished XII movement on left. Diminished masseteric activation. Intact facial sensation on left with some paresthesias.   Skin: Skin texture/appearance is appropriate for age  Eyes:   EOMI, sclera clear  Ears:   Left translab incision healing appropriately  Pinna are normal in shape and position  Oral cavity and Oropharynx: Mucous membranes moist without lesions present.  Tongue protrudes midline and palate elevates midline.  Neck: Supple without LAD.    Lungs:breathing comfortably  Psychiatric:  Mood and affect are normal    Imaging:      ASSESSMENT:   1. Facial paralysis            PLAN:     I discussed thoroughly the pathophysiology of the patient's facial paralysis.  I stated in the short term protecting her eye in minimizing the risk of exposure keratopathy is more time sensitive, especially given a poor Bell's phenomenon on exam.  I have recommended she establish care with an ophthalmologist to get a baseline corneal exam on the [place].    I have recommended platinum weight and she will consider this option and will get back in touch should she would like to proceed.  I additionally will plan to follow up with the patient at the three-month tracey to monitor her facial movement.    I did discuss that at the six-month tracey if she does not have any appreciable movement then we would want to consider nerve transfer surgery to assist with facial reanimation specifically 5 7 nerve " transfer.       I had an elaborate discussion with the patient regarding their condition and the further workup and management options. They are in understanding of the above stated plan.    Voice recognition software was used in the creation of this note/communication and any sound-alike errors which may have occurred from its use should be taken in context when interpreting. If such errors prevent a clear understanding of the note/communication, please contact the office for clarification.       Galdino Hodge MD  Facial Plastic and Reconstructive Surgeon  [facility]

## 2025-05-23 NOTE — LETTER
May 23, 2025        Dean Varela MD  [address] [facility] [address]             [facility]  [address] [facility] [address]  Phone: [phone]   Patient: Bibiana Go   MR Number: [id number]   YOB: 1975   Date of Visit: 5/23/2025       Dear Dr. Varela:    Thank you for referring Bibiana Go to me for evaluation. Below are the relevant portions of my assessment and plan of care.            If you have questions, please do not hesitate to call me. I look forward to following Bibiana along with you.    Sincerely,      Galdino Hodge MD           CC  No Recipients

## 2025-05-24 NOTE — PROGRESS NOTES
Subjective     Patient ID: Bibiana Go is a 50 y.o. female.    Chief Complaint: Post-op Evaluation    HPI    Bibiana Go is a 50 y.o. female s/p AS TLC for giant VS.  Doing well except has 6/6 left sided facial paralysis present since post op. No improvement noted.  Doing routine eye care. Denies persistent rhinorrhea.headaches and pre op nausea have resolved.      Review of Systems   Constitutional: Negative.    HENT:  Positive for facial swelling and hearing loss.    Eyes: Negative.    Respiratory: Negative.     Cardiovascular: Negative.    Gastrointestinal: Negative.    Endocrine: Negative.    Genitourinary: Negative.    Musculoskeletal: Negative.    Integumentary:  Negative.   Allergic/Immunologic: Negative.    Neurological: Negative.    Hematological: Negative.    Psychiatric/Behavioral: Negative.            Objective     Physical Exam  Vitals and nursing note reviewed.   Constitutional:       Appearance: Normal appearance.   HENT:      Head: Normocephalic and atraumatic.      Right Ear: Tympanic membrane, ear canal and external ear normal. There is no impacted cerumen.      Left Ear: Tympanic membrane, ear canal and external ear normal. There is no impacted cerumen.   Neurological:      Mental Status: She is alert.      Cranial Nerves: Facial asymmetry (left sided 6/6 palsy) present.       Post op MRI shows small residual       Assessment and Plan     1. Unilateral vestibular schwannoma  -     Ambulatory Referral/Consult to Physical Therapy; Future; Expected date: 05/27/2025    2. Postoperative examination        In summary this is a pleasant 50 y.o. with large brainstem compressing VS s/p TLC with near total resection and facial paralysis    Referral for vestibular rehab  F/u with Dr. Hodge for FNP  F/u in 1 mo          No follow-ups on file.

## 2025-05-26 ENCOUNTER — TELEPHONE (OUTPATIENT)
Dept: OPHTHALMOLOGY | Facility: CLINIC | Age: 50
End: 2025-05-26
Payer: COMMERCIAL

## 2025-05-26 ENCOUNTER — CLINICAL SUPPORT (OUTPATIENT)
Dept: REHABILITATION | Facility: HOSPITAL | Age: 50
End: 2025-05-26
Attending: OTOLARYNGOLOGY
Payer: COMMERCIAL

## 2025-05-26 DIAGNOSIS — D33.3 UNILATERAL VESTIBULAR SCHWANNOMA: ICD-10-CM

## 2025-05-26 DIAGNOSIS — M62.81 MUSCLE WEAKNESS: ICD-10-CM

## 2025-05-26 DIAGNOSIS — Z98.890 S/P CRANIOTOMY: Primary | ICD-10-CM

## 2025-05-26 PROCEDURE — 97530 THERAPEUTIC ACTIVITIES: CPT | Mod: PO

## 2025-05-26 PROCEDURE — 97162 PT EVAL MOD COMPLEX 30 MIN: CPT | Mod: PO

## 2025-05-26 NOTE — PROGRESS NOTES
Outpatient Rehab    Physical Therapy Evaluation    Patient Name: Bibaina Go  MRN: [id number]  YOB: 1975  Encounter Date: 5/26/2025    Therapy Diagnosis:   Encounter Diagnoses   Name Primary?    Unilateral vestibular schwannoma     S/P craniotomy Yes    Muscle weakness      Physician: Dean Varela MD    Physician Orders: Eval and Treat  Medical Diagnosis: Unilateral vestibular schwannoma    Visit # / Visits Authorized:  1 / 1  Insurance Authorization Period: 5/20/2025 to 5/20/2026  Date of Evaluation: 5/26/2025  Plan of Care Certification: 5/26/2025 to 7/21/2025     Time In: 0904   Time Out: 0950  Total Time (in minutes): 46   Total Billable Time (in minutes): 46    Intake Outcome Measure for FOTO Survey    Therapist reviewed FOTO scores for Bibiana Go on 5/26/2025.   FOTO report - see Media section or FOTO account episode details.     Intake Score: 55%    Precautions:       Subjective   History of Present Illness  Bibiana is a 50 y.o. female      The patient reports a medical diagnosis of D33.3 (ICD-10-CM) - Unilateral vestibular schwannoma.    Diagnostic tests related to this condition: MRI studies.   MRI Studies Details: 1.  Immediate postop changes of translabyrinthine resection of left CP angle mass.  Postoperative edema in the left brachium pontis along with small focus of diffusion restriction in the left inferior cerebellum.     2.  Minimal non-masslike enhancement in the left CP angle.  This will serve as the baseline examination.    History of Present Condition/Illness: Patient reports that she had a vestibular schwannoma removal on May 8th, 2025. States that her surgery lasted for 17 hours. States that she was in the ICU for 2 days following her surgery. States that she has not had any HA, vertigo, or imbalance since her surgery. Has had left sided facial paralysis since waking from her surgery. Currently unable to see out of her left eye and unable to hear out of her left ear.  Denies any difficulty with transfers or gait outside of general fatigue. Denies any weakness/numbness into either UE at this time. States that she has a minor amount of cervical stiffness.    Pain     Patient reports a current pain level of 2/10.     Location: Cervical pain  Pain Qualities: Dull, Aching         Review of Systems  Patient reports: Nausea, Cancer History, and Neck Pain  Patient denies: Dizziness, Headache, Cardiac History, Diabetes, Trauma History, Light-Headedness, Imbalance, and Vertigo        Treatment History  Treatments  Previously Received Treatments: No    Living Arrangements  Home Setup  Number of Levels in Home: One level        Employment  Patient reports: Does the patient's condition impact their ability to work?  Employment Status: Employed full-time   Works in accounting in remote job      Past Medical History/Physical Systems Review:   Bibiana Go  has a past medical history of Acoustic neuroma, Asthma, and History of PSVT (paroxysmal supraventricular tachycardia).    Bibiana Go  has a past surgical history that includes  section;  section with tubal ligation; Robot-assisted laparoscopic hysterectomy (N/A, 2019); Cystoscopy (N/A, 2019); Robot-assisted salpingectomy (Bilateral, 2019); Robot-assisted laparoscopic oophorectomy (Right, 2019); Hysterectomy (2019); Oophorectomy (2019); Tubal ligation (); craniotomy, translabyrinthine approach, with surgical removal of acoustic neuroma (Left, 2025); and craniotomy, translabyrinthine approach, with surgical removal of acoustic neuroma (Left, 2025).    Bibiana has a current medication list which includes the following prescription(s): artificial tears, famotidine, promethazine, and white petrolatum-mineral oil.    Review of patient's allergies indicates:   Allergen Reactions    Zofran [ondansetron hcl]      Constipation        Objective   Ocular Structure and Alignment  Pupillary  Symmetry: Symmetric  Head Position: Neutral  Left eye sealed shut with tape    Ocular Movement  Right Eye Ocular Range of Motion: Intact  Pursuits: Jerkiness, Saccadic intrusions  Horizontal Saccades: Intact  Vertical Saccades: Intact           Subcranial Range of Motion   Active Restricted? Passive Restricted? Pain   Flexion         Protraction         Retraction           Cervical Range of Motion   Active (deg) Passive (deg) Pain   Flexion 35       Extension 30       Right Lateral Flexion 20       Right Rotation 55       Left Lateral Flexion 35       Left Rotation 60                   Shoulder Strength - Planes of Motion   Right Strength Right Pain Left Strength Left  Pain   Flexion           Extension           ABduction 4+   4+     ADduction           Horizontal ABduction           Horizontal ADduction           Internal Rotation 0°           Internal Rotation 90°           External Rotation 0°           External Rotation 90°               Elbow Strength   Right Strength Right Pain Left Strength Left  Pain   Flexion (C6) 4+   4+     Extension (C7) 4+   4+                    Vestibulo-Ocular Reflex (VOR) Tests  Positive Head Thrust Impulse Test: Right and Left            Nystagmus and Associated Symptom Provocative Tests  Positive: End-Point Nystagmus  Negative: Gaze-Evoked Nystagmus  End-Point Nystagmus Pattern: Right beat    Vestibular Positional and Balance Testing       Balance Tests  Positive: Right Fukuda Stepping Test  Negative: Left Fukuda Stepping Test  Fukuda Stepping Test Details: Rotation to R side  Modified Clinical Test of Sensory Interaction and Balance (CTSIB-M): All conditions 30 seconds without fall               Ambulation Details    FGA = 22/30 with largest deficits being walking with eyes closed, tandem stance, and with head turns    Gait Analysis  Base of Support: Normal            Gait Analysis Details  Veering from straight line present, but unsure of related to environmental demands or  not.         Treatment:  Balance/Neuromuscular Re-Education  NMR 1: NV: HEP, standing balance with head turns on foam pad, standing marching in place, tandem stance balance, pball floor to ceiling, FGA tasks  Therapeutic Activity  TA 1: Patient was educated on exam findings, expectations following her surgery, possible need for referral to SLP services, and purpose of physical therapy  TA 2: Pt was given an HEP consisting of: VOR x1 horizontal and vertical, smooth pursuits horizontal and vertical, and standing hip abduction    Time Entry(in minutes):  PT Evaluation (Moderate) Time Entry: 33  Therapeutic Activity Time Entry: 13    Assessment & Plan   Assessment  Bibiana presents with a condition of Moderate complexity.   Presentation of Symptoms: Evolving  Will Comorbidities Impact Care: Yes  Hx of cancer and time spent in ICU    Functional Limitations: Activity tolerance, Auditory processing, Decreased ambulation distance/endurance, Getting off the floor, Maintaining balance, Range of motion, Sensory processing  Impairments: Impaired balance, Lack of appropriate home exercise program, Impaired physical strength    Patient Goal for Therapy (PT): To improve strength and return to normalcy  Prognosis: Good  Assessment Details: Patient presents today to outpatient physical therapy services for initial evaluation of their balance and gait following vestibular schwannoma removal on 5/8/2025. They present today with chief complaint of facial weakness at this time. Her surgical scar posterior to her left ear is healing well and without any s/s of infection at this time. She does present with facial drooping along her left side. She presents today with saccadic intrusions during her smooth pursuit movements, as well as a deficits in her VOR. No static standing balance deficits observed, but she did present with positive Fukuda stepping test and is on the cutoff for being at an increased risk for falls per her FGA. Only right  eye was assessed today 2/2 left sided facial weakness. She would benefit from physical therapy services to facilitate improvements in her smooth pursuit ocular movements, as well as in her dynamic and walking balance.      Plan  From a physical therapy perspective, the patient would benefit from: Skilled Rehab Services    Planned therapy interventions include: Therapeutic exercise, Therapeutic activities, Neuromuscular re-education, and Gait training.            Visit Frequency: 1 times Per Week for 8 Weeks.       This plan was discussed with Patient.   Discussion participants: Agreed Upon Plan of Care             The patient's spiritual, cultural, and educational needs were considered, and the patient is agreeable to the plan of care and goals.     Education  Education was done with Patient. The patient's learning style includes Listening. The patient Verbalizes understanding.         Patient was educated on exam findings, expectations following her surgery, possible need for referral to SLP services, and purpose of physical therapy       Goals:   Active       A) STGs       HEP       Start:  05/26/25    Expected End:  06/23/25       Pt will be independent and compliant with her HEP to impact their progress towards their goals           Smooth Pursuits       Start:  05/26/25    Expected End:  06/23/25       Patient will demonstrate smooth, accurate smooth pursuit eye movements to impact her ability to scan her environment            B) LTGs       FOTO       Start:  05/26/25    Expected End:  07/21/25       Patient will improve their FOTO score to >/= 62 to demonstrate clinically significant improvements in function and ADL performance          VOR       Start:  05/26/25    Expected End:  07/21/25       Patient will present with a negative head impulse test to demonstrate improvements in her VOR         Fukuda Stepping Test       Start:  05/26/25    Expected End:  07/21/25       Patient will present with a negative  Fukuda Stepping Test to demonstrate improved peripheral vestibular functioning         FGA       Start:  05/26/25    Expected End:  07/21/25       Patient will improve her FGA by </= 4 points to 26/30 to demonstrate improvements in her dynamic balance.             Luc Amato, PT

## 2025-05-26 NOTE — TELEPHONE ENCOUNTER
----- Message from Robin Douglas sent at 5/23/2025  4:05 PM CDT -----  Pt may need sooner than next availableCornea. facial paraylsis

## 2025-06-05 ENCOUNTER — CLINICAL SUPPORT (OUTPATIENT)
Dept: REHABILITATION | Facility: HOSPITAL | Age: 50
End: 2025-06-05
Payer: COMMERCIAL

## 2025-06-05 DIAGNOSIS — M62.81 MUSCLE WEAKNESS: ICD-10-CM

## 2025-06-05 DIAGNOSIS — D33.3 UNILATERAL VESTIBULAR SCHWANNOMA: ICD-10-CM

## 2025-06-05 DIAGNOSIS — Z98.890 S/P CRANIOTOMY: Primary | ICD-10-CM

## 2025-06-05 PROCEDURE — 97112 NEUROMUSCULAR REEDUCATION: CPT | Mod: PO

## 2025-06-12 ENCOUNTER — CLINICAL SUPPORT (OUTPATIENT)
Dept: REHABILITATION | Facility: HOSPITAL | Age: 50
End: 2025-06-12
Payer: COMMERCIAL

## 2025-06-12 DIAGNOSIS — D33.3 UNILATERAL VESTIBULAR SCHWANNOMA: ICD-10-CM

## 2025-06-12 DIAGNOSIS — Z98.890 S/P CRANIOTOMY: Primary | ICD-10-CM

## 2025-06-12 DIAGNOSIS — M62.81 MUSCLE WEAKNESS: ICD-10-CM

## 2025-06-12 PROCEDURE — 97112 NEUROMUSCULAR REEDUCATION: CPT | Mod: PO

## 2025-06-12 NOTE — PROGRESS NOTES
"  Outpatient Rehab    Physical Therapy Visit    Patient Name: Bibiana Go  MRN: [id number]  YOB: 1975  Encounter Date: 6/12/2025    Therapy Diagnosis:   Encounter Diagnoses   Name Primary?    S/P craniotomy Yes    Unilateral vestibular schwannoma     Muscle weakness      Physician: Dean Varela MD    Physician Orders: Eval and Treat  Medical Diagnosis: Unilateral vestibular schwannoma  Surgical Diagnosis: Not applicable for this Episode   Surgical Date: Not applicable for this Episode    Visit # / Visits Authorized:  2 / 20  Insurance Authorization Period: 5/26/2025 to 12/31/2025  Date of Evaluation: 5/26/2025  Plan of Care Certification: 5/26/2025 to 7/21/2025      PT/PTA:     Number of PTA visits since last PT visit:   Time In: 1100   Time Out: 1138  Total Time (in minutes): 38   Total Billable Time (in minutes): 38    FOTO:  Intake Score:  %  Survey Score 2:  %  Survey Score 3:  %    Precautions:       Subjective   Pt reports that she's doing okay today. States that her L eye has been the worst. States that her balance has been doing better..  Pain reported as 0/10.      Objective            Treatment:  Balance/Neuromuscular Re-Education  NMR 2: VOR x1 horizontal and vertical 2x 1:00  NMR 3: Standing balance NBOS with head turns on airex 2x1:00 each direction  NMR 4: Standing marching in place 3x :45  NMR 5: Tandem stance balance 3x30" ilana  NMR 6: Pball floor to ceiling 3x10  NMR 7: FGA tasks 2 laps each  NMR 8: Ambulatory smooth pursuits 2 laps each  NMR 9: Standing hip abduction 2x15 ilana    Time Entry(in minutes):  Neuromuscular Re-Education Time Entry: 38    Assessment & Plan   Assessment: Pt with good tolerance to all exercises performed this visit. Horizontal movements or cervical rotation does not seem to be provocative, however vertical head movements or cervical flex/ext seems to provoke mild postural instability in static standing and dynamic standing tasks. Slight dizziness became " present half-way during pt's treatment session which required a small rest break to resolve. Will continue to progress these tasks to faciltiate improvements in balance during daily tasks and walking outside of physical therapy.  Evaluation/Treatment Tolerance: Patient tolerated treatment well    The patient will continue to benefit from skilled outpatient physical therapy in order to address the deficits listed in the problem list on the initial evaluation, provide patient and family education, and maximize the patients level of independence in the home and community environments.     The patient's spiritual, cultural, and educational needs were considered, and the patient is agreeable to the plan of care and goals.           Plan: Continue 1x/wk per initial POC.    Goals:   Active       A) STGs       HEP       Start:  05/26/25    Expected End:  06/23/25       Pt will be independent and compliant with her HEP to impact their progress towards their goals           Smooth Pursuits       Start:  05/26/25    Expected End:  06/23/25       Patient will demonstrate smooth, accurate smooth pursuit eye movements to impact her ability to scan her environment            B) LTGs       FOTO       Start:  05/26/25    Expected End:  07/21/25       Patient will improve their FOTO score to >/= 62 to demonstrate clinically significant improvements in function and ADL performance          VOR       Start:  05/26/25    Expected End:  07/21/25       Patient will present with a negative head impulse test to demonstrate improvements in her VOR         Fukuda Stepping Test       Start:  05/26/25    Expected End:  07/21/25       Patient will present with a negative Fukuda Stepping Test to demonstrate improved peripheral vestibular functioning         FGA       Start:  05/26/25    Expected End:  07/21/25       Patient will improve her FGA by </= 4 points to 26/30 to demonstrate improvements in her dynamic balance.             Luc  Lm, PT

## 2025-06-16 ENCOUNTER — PATIENT MESSAGE (OUTPATIENT)
Dept: OTOLARYNGOLOGY | Facility: CLINIC | Age: 50
End: 2025-06-16
Payer: COMMERCIAL

## 2025-06-16 DIAGNOSIS — D33.3 ACOUSTIC NEUROMA: Primary | ICD-10-CM

## 2025-06-19 ENCOUNTER — CLINICAL SUPPORT (OUTPATIENT)
Dept: REHABILITATION | Facility: HOSPITAL | Age: 50
End: 2025-06-19
Payer: COMMERCIAL

## 2025-06-19 DIAGNOSIS — M62.81 MUSCLE WEAKNESS: ICD-10-CM

## 2025-06-19 DIAGNOSIS — Z98.890 S/P CRANIOTOMY: ICD-10-CM

## 2025-06-19 DIAGNOSIS — D33.3 UNILATERAL VESTIBULAR SCHWANNOMA: Primary | ICD-10-CM

## 2025-06-19 PROCEDURE — 97112 NEUROMUSCULAR REEDUCATION: CPT | Mod: PO

## 2025-06-19 NOTE — PROGRESS NOTES
"  Outpatient Rehab    Physical Therapy Visit    Patient Name: Bibiana Go  MRN: [id number]  YOB: 1975  Encounter Date: 6/19/2025    Therapy Diagnosis:   Encounter Diagnoses   Name Primary?    Unilateral vestibular schwannoma Yes    S/P craniotomy     Muscle weakness      Physician: Dean Varela MD    Physician Orders: Eval and Treat  Medical Diagnosis: Unilateral vestibular schwannoma  Surgical Diagnosis: Not applicable for this Episode   Surgical Date: Not applicable for this Episode  Days Since Last Surgery: Not applicable for this Episode    Visit # / Visits Authorized:  3 / 20  Insurance Authorization Period: 5/26/2025 to 12/31/2025  Date of Evaluation: 5/26/2025  Plan of Care Certification: 5/26/2025 to 7/21/2025      PT/PTA:     Number of PTA visits since last PT visit:   Time In: 1104   Time Out: 1144  Total Time (in minutes): 40   Total Billable Time (in minutes): 40    FOTO:  Intake Score:  %  Survey Score 2:  %  Survey Score 3:  %    Precautions:       Subjective   Pt reports that she's doing good today. States that her balance feels about the same..         Objective            Treatment:  Balance/Neuromuscular Re-Education  NMR 2: VOR x1 horizontal and vertical 2x 1:00 with metronome at 100bpm  NMR 3: Standing balance NBOS with head turns on airex 2x1:00 each direction  NMR 4: Standing marching in place 3x :45 on airex pad  NMR 5: Tandem stance balance 3x30" ilana  NMR 6: Pball floor to ceiling 3x10  NMR 7: FGA tasks 2 laps each  NMR 8: Ambulatory smooth pursuits 2 laps each  NMR 9: Standing hip abduction 2x15 ilana  NMR 10: Stading rotation with ball tosses 25x each way    Time Entry(in minutes):  Neuromuscular Re-Education Time Entry: 40    Assessment & Plan   Assessment: Pt with good tolerance to all exercises performed this visit. Horizontal movements did seem to provoke slight postural instability during dynamic balance tasks this visit. Added in ball toss with horizontal head turns " with good tolerance. No dizziness or vertigo provoked during patient's treatment plan today. Will continue to progress these tasks to faciltiate improvements in balance during daily tasks and walking outside of physical therapy.  Evaluation/Treatment Tolerance: Patient tolerated treatment well    The patient will continue to benefit from skilled outpatient physical therapy in order to address the deficits listed in the problem list on the initial evaluation, provide patient and family education, and maximize the patients level of independence in the home and community environments.     The patient's spiritual, cultural, and educational needs were considered, and the patient is agreeable to the plan of care and goals.           Plan: Continue 1x/wk per initial POC.    Goals:   Active       A) STGs       HEP       Start:  05/26/25    Expected End:  06/23/25       Pt will be independent and compliant with her HEP to impact their progress towards their goals           Smooth Pursuits       Start:  05/26/25    Expected End:  06/23/25       Patient will demonstrate smooth, accurate smooth pursuit eye movements to impact her ability to scan her environment            B) LTGs       FOTO       Start:  05/26/25    Expected End:  07/21/25       Patient will improve their FOTO score to >/= 62 to demonstrate clinically significant improvements in function and ADL performance          VOR       Start:  05/26/25    Expected End:  07/21/25       Patient will present with a negative head impulse test to demonstrate improvements in her VOR         Fukuda Stepping Test       Start:  05/26/25    Expected End:  07/21/25       Patient will present with a negative Fukuda Stepping Test to demonstrate improved peripheral vestibular functioning         FGA       Start:  05/26/25    Expected End:  07/21/25       Patient will improve her FGA by </= 4 points to 26/30 to demonstrate improvements in her dynamic balance.             Luc  Lm, PT

## 2025-06-20 ENCOUNTER — OFFICE VISIT (OUTPATIENT)
Dept: OTOLARYNGOLOGY | Facility: CLINIC | Age: 50
End: 2025-06-20
Payer: COMMERCIAL

## 2025-06-20 DIAGNOSIS — G51.0 FACIAL PARALYSIS: ICD-10-CM

## 2025-06-20 DIAGNOSIS — D33.3 ACOUSTIC NEUROMA: Primary | ICD-10-CM

## 2025-06-20 DIAGNOSIS — G96.01 CSF LEAK FROM EAR: ICD-10-CM

## 2025-06-20 DIAGNOSIS — D33.3 UNILATERAL VESTIBULAR SCHWANNOMA: ICD-10-CM

## 2025-06-20 PROCEDURE — 3044F HG A1C LEVEL LT 7.0%: CPT | Mod: CPTII,S$GLB,, | Performed by: OTOLARYNGOLOGY

## 2025-06-20 PROCEDURE — 99999 PR PBB SHADOW E&M-EST. PATIENT-LVL II: CPT | Mod: PBBFAC,,, | Performed by: OTOLARYNGOLOGY

## 2025-06-20 PROCEDURE — 1159F MED LIST DOCD IN RCRD: CPT | Mod: CPTII,S$GLB,, | Performed by: OTOLARYNGOLOGY

## 2025-06-20 PROCEDURE — 99024 POSTOP FOLLOW-UP VISIT: CPT | Mod: S$GLB,,, | Performed by: OTOLARYNGOLOGY

## 2025-06-20 NOTE — PROGRESS NOTES
Subjective     Patient ID: Bibiana Go is a 50 y.o. female.    Chief Complaint: Follow-up    Follow-up        Bibiana Go is a 50 y.o. 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 No improvement noted.  Doing routine eye care. Denies persistent rhinorrhea.headaches. She has seen Dr Hodge who discussed platinum weight which she is deferring at this time. Has been dealing with pneumonia post-op.     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 Discussed risk of infection related to CSF leak and need for intervention sooner rather than laer.     She elects to defer direct admission today and will present Monday for CSF leak repair.          No follow-ups on file.

## 2025-06-23 ENCOUNTER — HOSPITAL ENCOUNTER (OUTPATIENT)
Facility: HOSPITAL | Age: 50
LOS: 1 days | Discharge: HOME | End: 2025-06-24
Attending: OTOLARYNGOLOGY | Admitting: OTOLARYNGOLOGY
Payer: COMMERCIAL

## 2025-06-23 ENCOUNTER — ANESTHESIA (OUTPATIENT)
Dept: SURGERY | Facility: HOSPITAL | Age: 50
End: 2025-06-23
Payer: COMMERCIAL

## 2025-06-23 ENCOUNTER — ANESTHESIA EVENT (OUTPATIENT)
Dept: SURGERY | Facility: HOSPITAL | Age: 50
End: 2025-06-23
Payer: COMMERCIAL

## 2025-06-23 ENCOUNTER — PATIENT MESSAGE (OUTPATIENT)
Dept: OTOLARYNGOLOGY | Facility: CLINIC | Age: 50
End: 2025-06-23
Payer: COMMERCIAL

## 2025-06-23 DIAGNOSIS — D33.3 VESTIBULAR SCHWANNOMA: ICD-10-CM

## 2025-06-23 DIAGNOSIS — G96.00 CSF LEAK: Primary | ICD-10-CM

## 2025-06-23 PROCEDURE — 25000003 PHARM REV CODE 250: Performed by: NURSE ANESTHETIST, CERTIFIED REGISTERED

## 2025-06-23 PROCEDURE — 25000003 PHARM REV CODE 250

## 2025-06-23 PROCEDURE — C1713 ANCHOR/SCREW BN/BN,TIS/BN: HCPCS | Performed by: OTOLARYNGOLOGY

## 2025-06-23 PROCEDURE — 71000016 HC POSTOP RECOV ADDL HR: Performed by: OTOLARYNGOLOGY

## 2025-06-23 PROCEDURE — 94640 AIRWAY INHALATION TREATMENT: CPT

## 2025-06-23 PROCEDURE — 71000015 HC POSTOP RECOV 1ST HR: Performed by: OTOLARYNGOLOGY

## 2025-06-23 PROCEDURE — 37000008 HC ANESTHESIA 1ST 15 MINUTES: Performed by: OTOLARYNGOLOGY

## 2025-06-23 PROCEDURE — 63600175 PHARM REV CODE 636 W HCPCS: Performed by: STUDENT IN AN ORGANIZED HEALTH CARE EDUCATION/TRAINING PROGRAM

## 2025-06-23 PROCEDURE — 94761 N-INVAS EAR/PLS OXIMETRY MLT: CPT

## 2025-06-23 PROCEDURE — 27000221 HC OXYGEN, UP TO 24 HOURS

## 2025-06-23 PROCEDURE — 27201423 OPTIME MED/SURG SUP & DEVICES STERILE SUPPLY: Performed by: OTOLARYNGOLOGY

## 2025-06-23 PROCEDURE — 25000242 PHARM REV CODE 250 ALT 637 W/ HCPCS

## 2025-06-23 PROCEDURE — 25000003 PHARM REV CODE 250: Performed by: ANESTHESIOLOGY

## 2025-06-23 PROCEDURE — 63600175 PHARM REV CODE 636 W HCPCS: Performed by: OTOLARYNGOLOGY

## 2025-06-23 PROCEDURE — 37000009 HC ANESTHESIA EA ADD 15 MINS: Performed by: OTOLARYNGOLOGY

## 2025-06-23 PROCEDURE — 63600175 PHARM REV CODE 636 W HCPCS: Performed by: NURSE ANESTHETIST, CERTIFIED REGISTERED

## 2025-06-23 PROCEDURE — 25000003 PHARM REV CODE 250: Performed by: OTOLARYNGOLOGY

## 2025-06-23 PROCEDURE — 71000033 HC RECOVERY, INTIAL HOUR: Performed by: OTOLARYNGOLOGY

## 2025-06-23 PROCEDURE — 36000708 HC OR TIME LEV III 1ST 15 MIN: Performed by: OTOLARYNGOLOGY

## 2025-06-23 PROCEDURE — 25000003 PHARM REV CODE 250: Performed by: STUDENT IN AN ORGANIZED HEALTH CARE EDUCATION/TRAINING PROGRAM

## 2025-06-23 PROCEDURE — 36000709 HC OR TIME LEV III EA ADD 15 MIN: Performed by: OTOLARYNGOLOGY

## 2025-06-23 PROCEDURE — 94799 UNLISTED PULMONARY SVC/PX: CPT

## 2025-06-23 DEVICE — CEMENT HYDROSET INJ 10CC: Type: IMPLANTABLE DEVICE | Site: EAR | Status: FUNCTIONAL

## 2025-06-23 RX ORDER — SCOPOLAMINE 1 MG/3D
PATCH, EXTENDED RELEASE TRANSDERMAL
Status: DISPENSED
Start: 2025-06-23 | End: 2025-06-24

## 2025-06-23 RX ORDER — PROPOFOL 10 MG/ML
VIAL (ML) INTRAVENOUS
Status: DISCONTINUED | OUTPATIENT
Start: 2025-06-23 | End: 2025-06-23

## 2025-06-23 RX ORDER — DEXMEDETOMIDINE HYDROCHLORIDE 100 UG/ML
INJECTION, SOLUTION INTRAVENOUS
Status: DISCONTINUED | OUTPATIENT
Start: 2025-06-23 | End: 2025-06-23

## 2025-06-23 RX ORDER — PROCHLORPERAZINE EDISYLATE 5 MG/ML
5 INJECTION INTRAMUSCULAR; INTRAVENOUS EVERY 6 HOURS PRN
Status: DISCONTINUED | OUTPATIENT
Start: 2025-06-23 | End: 2025-06-24 | Stop reason: HOSPADM

## 2025-06-23 RX ORDER — PROCHLORPERAZINE EDISYLATE 5 MG/ML
5 INJECTION INTRAMUSCULAR; INTRAVENOUS EVERY 30 MIN PRN
Status: DISCONTINUED | OUTPATIENT
Start: 2025-06-23 | End: 2025-06-23 | Stop reason: HOSPADM

## 2025-06-23 RX ORDER — FENTANYL CITRATE 50 UG/ML
25 INJECTION, SOLUTION INTRAMUSCULAR; INTRAVENOUS EVERY 5 MIN PRN
Status: DISCONTINUED | OUTPATIENT
Start: 2025-06-23 | End: 2025-06-23 | Stop reason: HOSPADM

## 2025-06-23 RX ORDER — PHENYLEPHRINE HYDROCHLORIDE 10 MG/ML
INJECTION INTRAVENOUS
Status: DISCONTINUED | OUTPATIENT
Start: 2025-06-23 | End: 2025-06-23

## 2025-06-23 RX ORDER — LIDOCAINE HYDROCHLORIDE AND EPINEPHRINE 10; 10 UG/ML; MG/ML
INJECTION, SOLUTION INFILTRATION; PERINEURAL
Status: DISCONTINUED | OUTPATIENT
Start: 2025-06-23 | End: 2025-06-23 | Stop reason: HOSPADM

## 2025-06-23 RX ORDER — SODIUM CHLORIDE 9 MG/ML
INJECTION, SOLUTION INTRAVENOUS CONTINUOUS
Status: DISCONTINUED | OUTPATIENT
Start: 2025-06-23 | End: 2025-06-24 | Stop reason: HOSPADM

## 2025-06-23 RX ORDER — DEXAMETHASONE SODIUM PHOSPHATE 4 MG/ML
INJECTION, SOLUTION INTRA-ARTICULAR; INTRALESIONAL; INTRAMUSCULAR; INTRAVENOUS; SOFT TISSUE
Status: DISCONTINUED | OUTPATIENT
Start: 2025-06-23 | End: 2025-06-23

## 2025-06-23 RX ORDER — SODIUM CHLORIDE 0.9 % (FLUSH) 0.9 %
3 SYRINGE (ML) INJECTION
Status: DISCONTINUED | OUTPATIENT
Start: 2025-06-23 | End: 2025-06-23 | Stop reason: HOSPADM

## 2025-06-23 RX ORDER — GLUCAGON 1 MG
1 KIT INJECTION
Status: DISCONTINUED | OUTPATIENT
Start: 2025-06-23 | End: 2025-06-23 | Stop reason: HOSPADM

## 2025-06-23 RX ORDER — FENTANYL CITRATE 50 UG/ML
INJECTION, SOLUTION INTRAMUSCULAR; INTRAVENOUS
Status: DISCONTINUED | OUTPATIENT
Start: 2025-06-23 | End: 2025-06-23

## 2025-06-23 RX ORDER — SODIUM CHLORIDE 0.9 % (FLUSH) 0.9 %
2 SYRINGE (ML) INJECTION EVERY 6 HOURS PRN
Status: DISCONTINUED | OUTPATIENT
Start: 2025-06-23 | End: 2025-06-24 | Stop reason: HOSPADM

## 2025-06-23 RX ORDER — BACITRACIN ZINC 500 UNIT/G
OINTMENT (GRAM) TOPICAL
Status: DISCONTINUED | OUTPATIENT
Start: 2025-06-23 | End: 2025-06-23 | Stop reason: HOSPADM

## 2025-06-23 RX ORDER — ACETAMINOPHEN 10 MG/ML
INJECTION, SOLUTION INTRAVENOUS
Status: DISCONTINUED | OUTPATIENT
Start: 2025-06-23 | End: 2025-06-23

## 2025-06-23 RX ORDER — LIDOCAINE HYDROCHLORIDE 20 MG/ML
INJECTION INTRAVENOUS
Status: DISCONTINUED | OUTPATIENT
Start: 2025-06-23 | End: 2025-06-23

## 2025-06-23 RX ORDER — IPRATROPIUM BROMIDE AND ALBUTEROL SULFATE 2.5; .5 MG/3ML; MG/3ML
3 SOLUTION RESPIRATORY (INHALATION) ONCE
Status: COMPLETED | OUTPATIENT
Start: 2025-06-23 | End: 2025-06-23

## 2025-06-23 RX ORDER — CEFAZOLIN 2 G/1
INJECTION, POWDER, FOR SOLUTION INTRAMUSCULAR; INTRAVENOUS
Status: DISCONTINUED | OUTPATIENT
Start: 2025-06-23 | End: 2025-06-23

## 2025-06-23 RX ORDER — SCOPOLAMINE 1 MG/3D
1 PATCH, EXTENDED RELEASE TRANSDERMAL
Status: DISCONTINUED | OUTPATIENT
Start: 2025-06-23 | End: 2025-06-24 | Stop reason: HOSPADM

## 2025-06-23 RX ORDER — MIDAZOLAM HYDROCHLORIDE 1 MG/ML
INJECTION INTRAMUSCULAR; INTRAVENOUS
Status: DISCONTINUED | OUTPATIENT
Start: 2025-06-23 | End: 2025-06-23

## 2025-06-23 RX ORDER — HALOPERIDOL LACTATE 5 MG/ML
0.5 INJECTION, SOLUTION INTRAMUSCULAR EVERY 10 MIN PRN
Status: DISCONTINUED | OUTPATIENT
Start: 2025-06-23 | End: 2025-06-23 | Stop reason: HOSPADM

## 2025-06-23 RX ORDER — ROCURONIUM BROMIDE 10 MG/ML
INJECTION, SOLUTION INTRAVENOUS
Status: DISCONTINUED | OUTPATIENT
Start: 2025-06-23 | End: 2025-06-23

## 2025-06-23 RX ORDER — AMOXICILLIN 250 MG
1 CAPSULE ORAL 2 TIMES DAILY
Status: DISCONTINUED | OUTPATIENT
Start: 2025-06-23 | End: 2025-06-24 | Stop reason: HOSPADM

## 2025-06-23 RX ORDER — OFLOXACIN 3 MG/ML
SOLUTION/ DROPS OPHTHALMIC
Status: DISCONTINUED | OUTPATIENT
Start: 2025-06-23 | End: 2025-06-23 | Stop reason: HOSPADM

## 2025-06-23 RX ORDER — HYDROCODONE BITARTRATE AND ACETAMINOPHEN 5; 325 MG/1; MG/1
1 TABLET ORAL EVERY 4 HOURS PRN
Status: DISCONTINUED | OUTPATIENT
Start: 2025-06-23 | End: 2025-06-24 | Stop reason: HOSPADM

## 2025-06-23 RX ADMIN — SCOPOLAMINE 1 PATCH: 1.5 PATCH, EXTENDED RELEASE TRANSDERMAL at 06:06

## 2025-06-23 RX ADMIN — SUGAMMADEX 200 MG: 100 INJECTION, SOLUTION INTRAVENOUS at 06:06

## 2025-06-23 RX ADMIN — FENTANYL CITRATE 50 MCG: 50 INJECTION, SOLUTION INTRAMUSCULAR; INTRAVENOUS at 06:06

## 2025-06-23 RX ADMIN — ROCURONIUM BROMIDE 20 MG: 10 INJECTION, SOLUTION INTRAVENOUS at 04:06

## 2025-06-23 RX ADMIN — DEXMEDETOMIDINE 8 MCG: 100 INJECTION, SOLUTION, CONCENTRATE INTRAVENOUS at 06:06

## 2025-06-23 RX ADMIN — IPRATROPIUM BROMIDE AND ALBUTEROL SULFATE 3 ML: 2.5; .5 SOLUTION RESPIRATORY (INHALATION) at 07:06

## 2025-06-23 RX ADMIN — SODIUM CHLORIDE: 9 INJECTION, SOLUTION INTRAVENOUS at 03:06

## 2025-06-23 RX ADMIN — SENNOSIDES AND DOCUSATE SODIUM 1 TABLET: 50; 8.6 TABLET ORAL at 09:06

## 2025-06-23 RX ADMIN — ROCURONIUM BROMIDE 50 MG: 10 INJECTION, SOLUTION INTRAVENOUS at 03:06

## 2025-06-23 RX ADMIN — DEXAMETHASONE SODIUM PHOSPHATE 8 MG: 4 INJECTION, SOLUTION INTRAMUSCULAR; INTRAVENOUS at 03:06

## 2025-06-23 RX ADMIN — CEFAZOLIN 2 G: 2 INJECTION, POWDER, FOR SOLUTION INTRAMUSCULAR; INTRAVENOUS at 03:06

## 2025-06-23 RX ADMIN — LIDOCAINE HYDROCHLORIDE 50 MG: 20 INJECTION INTRAVENOUS at 03:06

## 2025-06-23 RX ADMIN — SODIUM CHLORIDE: 9 INJECTION, SOLUTION INTRAVENOUS at 09:06

## 2025-06-23 RX ADMIN — FENTANYL CITRATE 50 MCG: 50 INJECTION, SOLUTION INTRAMUSCULAR; INTRAVENOUS at 05:06

## 2025-06-23 RX ADMIN — PROCHLORPERAZINE EDISYLATE 5 MG: 5 INJECTION INTRAMUSCULAR; INTRAVENOUS at 09:06

## 2025-06-23 RX ADMIN — ACETAMINOPHEN 1000 MG: 10 INJECTION INTRAVENOUS at 05:06

## 2025-06-23 RX ADMIN — PROPOFOL 150 MG: 10 INJECTION, EMULSION INTRAVENOUS at 03:06

## 2025-06-23 RX ADMIN — PHENYLEPHRINE HYDROCHLORIDE 100 MCG: 10 INJECTION INTRAVENOUS at 04:06

## 2025-06-23 RX ADMIN — HYDROCODONE BITARTRATE AND ACETAMINOPHEN 1 TABLET: 5; 325 TABLET ORAL at 09:06

## 2025-06-23 RX ADMIN — FENTANYL CITRATE 50 MCG: 50 INJECTION, SOLUTION INTRAMUSCULAR; INTRAVENOUS at 04:06

## 2025-06-23 RX ADMIN — MIDAZOLAM HYDROCHLORIDE 2 MG: 2 INJECTION, SOLUTION INTRAMUSCULAR; INTRAVENOUS at 03:06

## 2025-06-23 RX ADMIN — FENTANYL CITRATE 50 MCG: 50 INJECTION, SOLUTION INTRAMUSCULAR; INTRAVENOUS at 03:06

## 2025-06-23 NOTE — BRIEF OP NOTE
[facility] - Surgery ([facility])  Brief Operative Note    SUMMARY     Surgery Date: 6/23/2025     Surgeons and Role:     * Dean Varela MD - Primary     * Behzad Kim MD - Resident - Assisting     * Iman Schwartz MD - Resident - Assisting        Pre-op Diagnosis:  Acoustic neuroma [D33.3]  Unilateral vestibular schwannoma [D33.3]    Post-op Diagnosis:  Post-Op Diagnosis Codes:     * Acoustic neuroma [D33.3]     * Unilateral vestibular schwannoma [D33.3]    Procedure(s) (LRB):  Blind sac closure of EAC with repair of CSF leak (Left)    Anesthesia: General    Implants:  Implant Name Type Inv. Item Serial No.  Lot No. LRB No. Used Action   CEMENT HYDROSET INJ 10CC - [id number]  CEMENT HYDROSET INJ 10CC  [employer] [id number] Left 1 Implanted       Operative Findings: L EAC closure for CSF leak     Estimated Blood Loss: * No values recorded between 6/23/2025  3:30 PM and 6/23/2025  6:16 PM *    Estimated Blood Loss has not been documented. EBL = 50.           Specimens:   Specimen (24h ago, onward)      None          * No specimens in log *    [id number]

## 2025-06-23 NOTE — ANESTHESIA PROCEDURE NOTES
Intubation    Date/Time: 6/23/2025 3:45 PM    Performed by: Penelope Negrete CRNA  Authorized by: Ilda Cassidy MD    Intubation:     Induction:  Intravenous    Intubated:  Postinduction    Mask Ventilation:  Easy with oral airway    Attempts:  1    Attempted By:  CRNA    Method of Intubation:  Video laryngoscopy    Blade:  Huffman 3    Laryngeal View Grade: Grade I - full view of cords      Difficult Airway Encountered?: No      Complications:  None    Airway Device:  Oral endotracheal tube    Airway Device Size:  7.5    Style/Cuff Inflation:  Cuffed (inflated to minimal occlusive pressure)    Tube secured:  21    Secured at:  The lips    Placement Verified By:  Capnometry    Complicating Factors:  None    Findings Post-Intubation:  BS equal bilateral and atraumatic/condition of teeth unchanged

## 2025-06-23 NOTE — TRANSFER OF CARE
Anesthesia Transfer of Care Note    Patient: Bibiana Go    Procedure(s) Performed: Procedure(s) (LRB):  Blind sac closure of EAC with repair of CSF leak (Left)    Patient location: PACU    Anesthesia Type: general    Transport from OR: Transported from OR on 6-10 L/min O2 by face mask with adequate spontaneous ventilation    Post pain: adequate analgesia    Post assessment: no apparent anesthetic complications    Post vital signs: stable    Level of consciousness: awake and sedated    Nausea/Vomiting: no nausea/vomiting    Complications: none    Transfer of care protocol was followed      Last vitals: Visit Vitals  BP (!) 174/103   Pulse (!) 120   Temp 37.3 °C (99.1 °F) (Temporal)   Resp 16   LMP 06/18/2019 (Exact Date)   SpO2 95%   Breastfeeding No

## 2025-06-23 NOTE — ANESTHESIA PREPROCEDURE EVALUATION
06/23/2025  Bibiana Go is a 50 y.o., female.      Pre-op Assessment    I have reviewed the Patient Summary Reports.     I have reviewed the Nursing Notes. I have reviewed the NPO Status.   I have reviewed the Medications.     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           Pulmonary:  Pneumonia  Asthma                        Physical Exam  General: Cooperative  Left sided facial droop due to facial nerve palsy  Airway:  Mallampati: II   Mouth Opening: Normal  TM Distance: Normal  Tongue: Normal  Neck ROM: Normal ROM    Dental:  Intact    Anesthesia Plan  Type of Anesthesia, risks & benefits discussed:    Anesthesia Type: Gen ETT  Intra-op Monitoring Plan: Standard ASA Monitors  Post Op Pain Control Plan: multimodal analgesia  Induction:  IV  Airway Plan: Direct, Post-Induction  Informed Consent: Informed consent signed with the Patient and all parties understand the risks and agree with anesthesia plan.  All questions answered.   ASA Score: 2  Day of Surgery Review of History & Physical: H&P Update referred to the surgeon/provider.  Anesthesia Plan Notes: ZOFRAN ALLERGY.  After induction, will lubricate and tape eyes.  After emergence, will keep left eye taped for protection(patient has left eye taped closed preoperatively)    Ready For Surgery From Anesthesia Perspective.     .

## 2025-06-24 VITALS
HEART RATE: 93 BPM | OXYGEN SATURATION: 92 % | SYSTOLIC BLOOD PRESSURE: 156 MMHG | RESPIRATION RATE: 18 BRPM | TEMPERATURE: 98 F | DIASTOLIC BLOOD PRESSURE: 96 MMHG

## 2025-06-24 PROCEDURE — 63600175 PHARM REV CODE 636 W HCPCS: Performed by: STUDENT IN AN ORGANIZED HEALTH CARE EDUCATION/TRAINING PROGRAM

## 2025-06-24 PROCEDURE — 25000003 PHARM REV CODE 250: Performed by: STUDENT IN AN ORGANIZED HEALTH CARE EDUCATION/TRAINING PROGRAM

## 2025-06-24 RX ORDER — PROCHLORPERAZINE EDISYLATE 5 MG/ML
5 INJECTION INTRAMUSCULAR; INTRAVENOUS EVERY 6 HOURS PRN
Qty: 10 ML | Refills: 0 | Status: SHIPPED | OUTPATIENT
Start: 2025-06-24 | End: 2025-07-04

## 2025-06-24 RX ORDER — HYDROCODONE BITARTRATE AND ACETAMINOPHEN 5; 325 MG/1; MG/1
1 TABLET ORAL EVERY 6 HOURS PRN
Qty: 12 TABLET | Refills: 0 | Status: SHIPPED | OUTPATIENT
Start: 2025-06-24

## 2025-06-24 RX ADMIN — HYDROCODONE BITARTRATE AND ACETAMINOPHEN 1 TABLET: 5; 325 TABLET ORAL at 05:06

## 2025-06-24 RX ADMIN — PROCHLORPERAZINE EDISYLATE 5 MG: 5 INJECTION INTRAMUSCULAR; INTRAVENOUS at 05:06

## 2025-06-24 NOTE — DISCHARGE SUMMARY
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 Patient admitted for post-op monitoring. No acute perioperative events. Pain and nausea controlled on oral medication. Tolerating diet. Overall doing well, no reported subjective signs of CSF leak, patient stable for discharge    Physical Exam:  No acute distress  Breathing comfortably on room air  Left elisabet dressing clean, intact, no drainage  HB VI left, HB I Right    Consults: None    Significant Diagnostic Studies: N/A    Pending Diagnostic Studies:       None          Final Active Diagnoses:    Diagnosis Date Noted POA    PRINCIPAL PROBLEM:  CSF leak [G96.00] 06/23/2025 Yes    Acoustic neuroma [D33.3] 07/05/2024 Yes      Problems Resolved During this Admission:      Discharged Condition: good    Disposition: Home or Self Care    Follow Up:    Patient Instructions:   No discharge procedures on file.  Medications:  Reconciled Home Medications:      Medication List        START taking these medications      HYDROcodone-acetaminophen 5-325 mg per tablet  Commonly known as: NORCO  Take 1 tablet by mouth every 6 (six) hours as needed for Pain.     prochlorperazine 10 mg/2 mL (5 mg/mL) Soln injection  Commonly known as: COMPAZINE  Inject 1 mL (5 mg total) into the vein every 6 (six) hours as needed.            CONTINUE taking these medications      artificial tears 0.5 % ophthalmic  solution  Commonly known as: ISOPTO TEARS  Place 2 drops into the left eye 4 (four) times daily.     benzonatate 100 MG capsule  Commonly known as: TESSALON  Take 1 capsule (100 mg total) by mouth 3 (three) times daily as needed for Cough.     white petrolatum-mineral oiL 94-3 % Oint  Place into the left eye every evening.              Behzad Kim MD  Otorhinolaryngology-Head & Neck Surgery  [facility]

## 2025-06-24 NOTE — ANESTHESIA POSTPROCEDURE EVALUATION
Anesthesia Post Evaluation    Patient: Bibiana Go    Procedure(s) Performed: Procedure(s) (LRB):  Blind sac closure of EAC with repair of CSF leak (Left)    Final Anesthesia Type: general      Patient location during evaluation: PACU  Patient participation: Yes- Able to Participate  Level of consciousness: awake and alert and oriented  Post-procedure vital signs: reviewed and stable  Pain management: adequate  Airway patency: patent    PONV status at discharge: No PONV  Anesthetic complications: no      Cardiovascular status: hemodynamically stable  Respiratory status: unassisted and spontaneous ventilation  Hydration status: euvolemic  Follow-up not needed.          Vitals Value Taken Time   /96 06/24/25 05:25   Temp 36.4 °C (97.5 °F) 06/24/25 05:25   Pulse 93 06/24/25 05:25   Resp 18 06/24/25 05:25   SpO2 92 % 06/24/25 05:25         Event Time   Out of Recovery 19:15:00         Pain/Miriam Score: Pain Rating Prior to Med Admin: 6 (6/24/2025  5:11 AM)  Pain Rating Post Med Admin: 3 (6/23/2025 10:18 PM)  Miriam Score: 9 (6/23/2025  8:42 PM)

## 2025-06-24 NOTE — DISCHARGE INSTRUCTIONS
Tympanoplasty Post-operative Instructions    Precautions   Do not blow your nose until your physician has indicated that your ear is healed.  Any accumulated secretions in the nose may be drawn back into the throat and expectorated if desired.  This particularly important if you develop a cold.   Do not pop your ears by holding your nose and blowing air through the eustachian tube into the ear.  If it is necessary to sneeze, do so with your mouth open.  Do not allow water to enter the ear until advised by your physician that he ear is healed.  Until such time, when showering or washing your hair, cotton may be placed in the outer ear opening and covered in Vaseline.  If an incision was made in the skin behind your ear, water should be kept away from this area for 1 week.  Do not take an unnecessary chance of catching a cold.  Avoid undue exposure or fatigue.  Should you catch a cold, treat it in your usual way, reporting to your physician if you develop ear symptoms  You may anticipate a certain amount of pulsation, popping, clicking, and other sounds in the ear, and a feeling of fullness in the ear.  Occasional sharp shooting pains are not unusual.  Sometimes it may feel as if there is liquid in the ear.  Do not plan to drive a car home from the hospital.  Air travel is ok 2 days after surgery.  When changing altitude, you should remain awake and chew gum to stimulate swallowing.  Dizziness  Minor dizziness may be present on head motion and not concern you unless it increases  Hearing  Hearing may be worse temporarily after surgery due to swelling and packing in the ear canal.  An improvement may be noted after 6-8 weeks, while maximum improvement may take up to 4-6 months.  Discharge  A bloody or Watery discharge may occur during the healing period.  The outer ear cotton may be changed if necessary   A purulent discharge at any time is an indication to call to make an appointment  Pain  Mild, intermittent ear  pain is not unusual during the first 2 weeks.  Pain above or in front of the ear is common when chewing.  If you have persistent unrelenting pain please let your physician know  Ear Drops  If you were given ear drops please start 1 week after surgery.  Place a few drops in the ear twice a day to loosen the packing which will run out of the ear as a liquid.  Tip the head to the side, place two drops in the ear, and allow them to remain for 5 minutes.  The tip the head to the opposite direction to allow the drops to run out.  Continue using until advised otherwise by your physician.     Dressing  Velcro dressing over the ear after surgery can be removed tomorrow.  Undo velcro strap and remove gauze.  There should be a shiny gauze over incision which can also be removed.  Leave the steri-strips (tape) in place and keep incision dry for first week.  There will be a cotton ball at the opening of the ear canal which can be changed out as needed.  Bloody drainage is normal the first week.

## 2025-06-24 NOTE — CARE UPDATE
Unit [facility] Care Support Interaction      I have reviewed the chart of Bibiana Go who is hospitalized for CSF leak. The patient is currently located in the following unit: [facility]           I have seen and examined the patient and provided the following support:     Patient experience rounds - positive experience reported       Bryan Karimi, SIDP-C  Unit Based [facility]

## 2025-06-24 NOTE — PLAN OF CARE
[facility] - [facility]  Discharge Final Note    Primary Care Provider: Nirmala Ji MD  Expected Discharge Date: 6/24/2025    Patient medically ready for discharge to Home.     Transportation by family.     Is family/patient aware of discharge: yes     Hospital follow up scheduled: yes       Final Discharge Note (most recent)       Final Note - 06/24/25 1222          Final Note    Assessment Type Final Discharge Note     Anticipated Discharge Disposition Home or Self Care     Hospital Resources/Appts/Education Provided Provided patient/caregiver with written discharge plan information                     Important Message from Medicare         Referral Info (most recent)       Referral Info    No documentation.                   Future Appointments   Date Time Provider Department Center   6/30/2025  8:30 AM Vinh Matos OD [facility] [place]   7/1/2025  8:15 AM Dean Varela MD [facility] ENT [facility]   7/3/2025 11:00 AM Luc Amato, PT [facility]   7/10/2025 11:00 AM Luc Amato, PT [facility]   7/17/2025 11:00 AM Luc Amato, PT [facility]   7/24/2025 11:00 AM [facility]     Sharif Suggs RN  Case Management  Ext: [phone]  06/24/2025  \

## 2025-06-24 NOTE — OP NOTE
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  Patient presented to the office with symptoms of rhinorrhea and salty taste that was concerning for a CSF leak.  On examination of her left ear there was found to be pulsatile clear fluid in the middle ear space indicate any if active CSF leak.  It is recommended the patient undergo urgent Eustachian tube obliteration and blind sac closure of the ear canal to prevent further complications from CSF leak.    Operative Findings (including complications, if any):    High flow CSF leak appearing to be entering middle ear through hypo tympanum and retrofacial air cells  EAC inverted and sewn shut. ET obliterated with muscle and bone wax and middle ear space obliterated with hydroset bone cement    Description of Technical Procedures:   Patient was taken to the operating room and general anesthesia induced.  The patient intubated.  The bed was turned 180°.  Left ear was sterilely prepped draped in standard fashion for otologic surgery.      Microscope was brought into field.  The postauricular area in the ear canal were injected 1% lidocaine with epinephrine.  A C-shaped postauricular incision was made adjacent to the postauricular crease.  Dissection proceeded down to the mastoid periosteum.  A periosteal incision was made across mastoid just posterior  to the ear canal and along the linea temporalis.  The periosteum was elevated to the ear canal exposing the previous craniotomy defect.  This was well covered with bone cement.  Ear canal soft tissue was elevated from the osseous ear canal.  An incision was made at the junction of the soft tissue and osseous ear canal through the ear canal skin.  This was completed all the way around the ear canal.  Then while retracting the auricle and soft tissue of the ear canal the ear canal skin was carefully dissected from the surrounding cartilage to give it some mobility and freed up for a version.  Once this had been done 360° sutures were placed through the superior and inferior edges ear canal skin.  These were then grasped with a hemostat coming through the meatus to invert the skin out through the meatus.  This was then closed in a linear fashion in a simple interrupted manner using 4-0 Vicryl.  Our periosteal flap was then cut to allow for it to provide a 2nd layer and this was wrapped around the closure.  Now we focused our attention on a dressing this spinal fluid leak the remainder of the ear canal skin was removed after elevating it along with the annulus.  Upon elevating the annulus a large rush of CSF was encountered.  There appeared to be a active high-flow leak.  Remainder of the tympanic membrane was removed along with the malleus.  And then the anterior ear canal was drilled using a 4 clyde bur until the eustachian tube orifice could be clearly seen.  The CSF was given some time to allow it to decrease in flow.  Then Eustachian tube orifice was blocked by placing 1st a piece of muscle from the temporalis into the orifice and then a piece of bone wax which was pushed to mold into the orifice using a cottonoid.  Bone wax was also placed in the hypotympanum to slow down some of flow as the flow CSF appeared to be coming inferiorly and posteriorly into the cavity.  Then HydroSet bone cement 10 mL was mixed  according to manufacture guidelines.  This was placed in the syringe.  Then HydroSet was placed into the middle ear using a syringe completely filling the space and then completely obliterating the ear canals well up to the level of the surface of the mastoid.  This was allowed to part and there was no more egress of CSF through the bone cement.  The repair appeared to be solid.  Then another additional piece of muscle was placed over the opening of the ear canal periosteum had been placed over prior closure.  Then the soft tissue of the ear was closed in an inverted interrupted fashion using 3-0 Vicryl.  Steri-Strips were used for the skin.  A [place] dressing was placed ear.  The patient was turned over Anesthesia awakened from surgery.      Estimated Blood Loss (EBL): 30ml    Implants:   Implant Name Type Inv. Item Serial No.  Lot No. LRB No. Used Action   CEMENT HYDROSET INJ 10CC - [id number]  CEMENT HYDROSET INJ 10CC  [employer]. [id number] Left 1 Implanted       Specimens:   Specimen (24h ago, onward)      None           * No specimens in log *           Condition: Good    Disposition: PACU - hemodynamically stable.    Attestation: Op Note Attestation: I was physically present and scrubbed for the entire procedure.

## 2025-06-24 NOTE — PLAN OF CARE
[facility] Plan of Care Note  Dx csf leak    Shift Events:  pain / nausea, prn meds given    Goals of Care:     Neuro: A&Ox4     Vital Signs: see chart    Respiratory: see chart    Diet: regular    Is patient tolerating current diet? yes    GTTS: LR @75    Urine Output/Bowel Movement: urine ouput- 500    Drains/Tubes/Tube Feeds (include total output/shift): n/a    Lines: 20 g left hand      Accuchecks:n/a    Skin: see LDA    Fall Risk Score: see flowsheet    Activity level? Standby assist    Any scheduled procedures? no    Any safety concerns?     Other:

## 2025-06-24 NOTE — PROGRESS NOTES
Patient tolerated breathing treatment well. She has been placed on 2L NC with oxygen saturation 94%

## 2025-06-24 NOTE — PROGRESS NOTES
Patient came to PACU on 6L simple facemask. Once patient was awake, I attempted to wean her off facemask onto nasal cannula. Patient's oxygen dropped to 97 on 4L NC, she was then placed back on 6L simple facemask where her oxygen only pablo to 94%. Anesthesia resident was notified, came to bedside and has ordered breathing treatments.

## 2025-06-24 NOTE — NURSING TRANSFER
Nursing Transfer Note      6/23/2025   8:26 PM    Nurse giving handoff: MONROE Boswell  Nurse receiving handoff:MONROE Manrique    Transfer To: 1004    Transfer via bed    Transfer with  to O2, cardiac monitoring    Transported by transport    Telemetry: Box Number [id number], Rate 110, and Rhythm sinus tach  Order for Tele Monitor? Yes    Additional Lines: Oxygen    Patient belongings transferred with patient: No    Chart send with patient: Yes    Notified: spouse    Patient reassessed at: 2000

## 2025-06-24 NOTE — CARE UPDATE
I have reviewed the chart of Bibiana Ledbetterin who is hospitalized for the following:    Active Hospital Problems    Diagnosis    *CSF leak    Muscle weakness    Acoustic neuroma    BMI 29.0-29.9,adult        Bryan Karimi, SIDP-C  Unit Based [facility]

## 2025-06-30 ENCOUNTER — OFFICE VISIT (OUTPATIENT)
Dept: OPTOMETRY | Facility: CLINIC | Age: 50
End: 2025-06-30
Payer: COMMERCIAL

## 2025-06-30 DIAGNOSIS — H02.234 PARALYTIC LAGOPHTHALMOS OF LEFT UPPER EYELID: Primary | ICD-10-CM

## 2025-06-30 DIAGNOSIS — D33.3 UNILATERAL VESTIBULAR SCHWANNOMA: ICD-10-CM

## 2025-06-30 DIAGNOSIS — G51.0 FACIAL PARALYSIS: ICD-10-CM

## 2025-06-30 DIAGNOSIS — H16.212 EXPOSURE KERATOPATHY, LEFT: ICD-10-CM

## 2025-06-30 PROCEDURE — 92004 COMPRE OPH EXAM NEW PT 1/>: CPT | Mod: S$GLB,,,

## 2025-06-30 PROCEDURE — 1159F MED LIST DOCD IN RCRD: CPT | Mod: CPTII,S$GLB,,

## 2025-06-30 PROCEDURE — 99999 PR PBB SHADOW E&M-EST. PATIENT-LVL III: CPT | Mod: PBBFAC,,,

## 2025-06-30 PROCEDURE — 3044F HG A1C LEVEL LT 7.0%: CPT | Mod: CPTII,S$GLB,,

## 2025-06-30 NOTE — PROGRESS NOTES
HPI    K check     Pt had benign brain tumor removed May 8 (Dr. Bolton). OS not closing all   the way. Pt has been using AT;s and patching majority of the day. Pt ENT   wanted to add weight to lid to keep closed, pt is not ready for that. Pt   is starting PT to help with lid.  Pt needs cornea checked for health.   Last edited by Gilma Clements on 6/30/2025  8:35 AM.            Assessment /Plan     For exam results, see Encounter Report.    Paralytic lagophthalmos of left upper eyelid    Exposure keratopathy, left    Facial paralysis  -     Ambulatory referral/consult to Ophthalmology    Unilateral vestibular schwannoma      1-4. Pt presented for ocular health check, specifically of the cornea, following surgical resection of large brainstem compressing vestibular schwannoma 05/08/25. Following surgery, pt presented with facial paralysis of the left side. In-office today, pt presented with lagophthalmos / incomplete blink and diffuse 3+ SPK OS. OD WNL. Pt reports not using artificial tears or ocular gel/steve due to difficulties with drop instillation (aversion to drops). Pt prefers to keep eye patched using medical tape. Ocular health otherwise WNL OD, OS with normal IOP and no optic nerve/retinal pathology (no disc edema s/p CSF leak). Reviewed all findings with pt and ed pt on the nature/etiology of lagophthalmos, exposure keratopathy, etc. Advised pt to continue taping lid closed entirely if unable to tolerate drops - especially at night. Discussed using an adhesive patch instead and layering underneath with tissues. Pt beginning physical therapy soon to see if it can aid with lid closure. Otherwise, pt's doctor has discussed option for placing a weight in the upper eyelid - pt would rather avoid if possible. Briefly discussed nature of gold/platinum weight instillation in the lid and how it can improve signs/symptoms of exposure keratopathy. Ed pt to RTC asap if any sudden changes in vision, redness, pain, or  other issues arise. Otherwise, will continue to monitor as needed.    RTC: in-office prn.

## 2025-07-01 ENCOUNTER — OFFICE VISIT (OUTPATIENT)
Dept: OTOLARYNGOLOGY | Facility: CLINIC | Age: 50
End: 2025-07-01
Payer: COMMERCIAL

## 2025-07-01 DIAGNOSIS — Z09 POSTOPERATIVE EXAMINATION: Primary | ICD-10-CM

## 2025-07-01 PROCEDURE — 99024 POSTOP FOLLOW-UP VISIT: CPT | Mod: S$GLB,,, | Performed by: OTOLARYNGOLOGY

## 2025-07-01 PROCEDURE — 1159F MED LIST DOCD IN RCRD: CPT | Mod: CPTII,S$GLB,, | Performed by: OTOLARYNGOLOGY

## 2025-07-01 PROCEDURE — 99999 PR PBB SHADOW E&M-EST. PATIENT-LVL II: CPT | Mod: PBBFAC,,, | Performed by: OTOLARYNGOLOGY

## 2025-07-01 PROCEDURE — 3044F HG A1C LEVEL LT 7.0%: CPT | Mod: CPTII,S$GLB,, | Performed by: OTOLARYNGOLOGY

## 2025-07-01 NOTE — PROGRESS NOTES
Subjective     Patient ID: Bibiana Go is a 50 y.o. female.    Chief Complaint: Post-op Evaluation    Follow-up        Bibiana Go is a 50 y.o. 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 No improvement noted.  Last visit noted to have CSF rhinorrhea and was taken back to the operating room for ear canal obliteration and Eustachian tube packing    Here for follow up today reports resolution of rhinorrhea and coughing.           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

## 2025-07-01 NOTE — LETTER
July 1, 2025        Farzad Mccord,   [address]  [facility] [address]             [facility] [address]  [address]  [place] [address]  Phone: [phone]  Fax: [phone]   Patient: Bibiana Go   MR Number: [id number]   YOB: 1975   Date of Visit: 7/1/2025       Dear Dr. Mccord:    Thank you for referring Bibiana Go to me for evaluation. Below are the relevant portions of my assessment and plan of care.            If you have questions, please do not hesitate to call me. I look forward to following Bibiana along with you.    Sincerely,      Master, MD Dean           CC  No Recipients

## 2025-08-19 ENCOUNTER — OFFICE VISIT (OUTPATIENT)
Dept: OTOLARYNGOLOGY | Facility: CLINIC | Age: 50
End: 2025-08-19
Payer: COMMERCIAL

## 2025-08-19 DIAGNOSIS — D33.3 UNILATERAL VESTIBULAR SCHWANNOMA: Primary | ICD-10-CM

## 2025-08-19 DIAGNOSIS — G51.0 FACIAL PARALYSIS: ICD-10-CM

## 2025-08-19 PROCEDURE — 99213 OFFICE O/P EST LOW 20 MIN: CPT | Mod: S$GLB,,, | Performed by: OTOLARYNGOLOGY

## 2025-08-19 PROCEDURE — 1159F MED LIST DOCD IN RCRD: CPT | Mod: CPTII,S$GLB,, | Performed by: OTOLARYNGOLOGY

## 2025-08-19 PROCEDURE — 99999 PR PBB SHADOW E&M-EST. PATIENT-LVL II: CPT | Mod: PBBFAC,,, | Performed by: OTOLARYNGOLOGY

## 2025-08-19 PROCEDURE — 3044F HG A1C LEVEL LT 7.0%: CPT | Mod: CPTII,S$GLB,, | Performed by: OTOLARYNGOLOGY

## 2025-09-02 ENCOUNTER — OFFICE VISIT (OUTPATIENT)
Dept: OTOLARYNGOLOGY | Facility: CLINIC | Age: 50
End: 2025-09-02
Payer: COMMERCIAL

## 2025-09-02 VITALS — WEIGHT: 165.38 LBS | BODY MASS INDEX: 29.29 KG/M2

## 2025-09-02 DIAGNOSIS — G51.0 FACIAL PARALYSIS: Primary | ICD-10-CM

## 2025-09-02 PROCEDURE — 1159F MED LIST DOCD IN RCRD: CPT | Mod: CPTII,95,, | Performed by: STUDENT IN AN ORGANIZED HEALTH CARE EDUCATION/TRAINING PROGRAM

## 2025-09-02 PROCEDURE — 1160F RVW MEDS BY RX/DR IN RCRD: CPT | Mod: CPTII,95,, | Performed by: STUDENT IN AN ORGANIZED HEALTH CARE EDUCATION/TRAINING PROGRAM

## 2025-09-02 PROCEDURE — 3044F HG A1C LEVEL LT 7.0%: CPT | Mod: CPTII,95,, | Performed by: STUDENT IN AN ORGANIZED HEALTH CARE EDUCATION/TRAINING PROGRAM

## 2025-09-02 PROCEDURE — 98006 SYNCH AUDIO-VIDEO EST MOD 30: CPT | Mod: 95,,, | Performed by: STUDENT IN AN ORGANIZED HEALTH CARE EDUCATION/TRAINING PROGRAM

## 2025-09-03 DIAGNOSIS — R20.2 FACIAL PARESTHESIA: ICD-10-CM

## 2025-09-03 DIAGNOSIS — G51.0 FACIAL PARALYSIS: Primary | ICD-10-CM

## (undated) DEVICE — Device

## (undated) DEVICE — BURR ROUND DMND CRS ELT 4.0MM

## (undated) DEVICE — BLADE SCALP OPHTL RND TIP

## (undated) DEVICE — ELECTRODE MEGADYNE RETURN DUAL

## (undated) DEVICE — TOWEL OR DISP STRL BLUE 4/PK

## (undated) DEVICE — TIP SONAPET IQ MICRO 12CM

## (undated) DEVICE — SUT 3/0 18IN COATED VICRYLP

## (undated) DEVICE — SUT BONE WAX 2.5 GRMS 12/BX

## (undated) DEVICE — BURR STEEL ROUND E9000 2X48MM

## (undated) DEVICE — SOL IRR WATER STRL 3000 ML

## (undated) DEVICE — TRAY SKIN SCRUB WET PREMIUM

## (undated) DEVICE — SLEEVE ECLIPSE GOWN STRL 23IN

## (undated) DEVICE — KIT SUCTION IRRIGATION

## (undated) DEVICE — DRAPE HALF SURGICAL 40X58IN

## (undated) DEVICE — KIT EAR EAOFE OMC

## (undated) DEVICE — BLADE SURG CARBON STEEL SZ11

## (undated) DEVICE — ELECTRODE BLADE INSULATED 1 IN

## (undated) DEVICE — CASSETTE SONOPET IQ IRRIGATION

## (undated) DEVICE — CLIPPER BLADE MOD 4406 (CAREF)

## (undated) DEVICE — DRAPE OPMI STERILE

## (undated) DEVICE — SYS LABEL CORRECT MED

## (undated) DEVICE — BURR PRECISION ROUND 3.0MM

## (undated) DEVICE — SUT 4/0 18IN COATED VICRYL

## (undated) DEVICE — HEMOSTAT SURGICEL 4X8IN

## (undated) DEVICE — PAD CUSTOM COTTON 2 X 2

## (undated) DEVICE — KIT DRESS GLASSCK EAR ADLT ST

## (undated) DEVICE — BIT PERFORATOR LARGE

## (undated) DEVICE — TUBE EMG NIM 7.0MM TRIVANTAGE

## (undated) DEVICE — DRAPE STERI INSTRUMENT 1018

## (undated) DEVICE — SUCTION SURGICAL FRAZR

## (undated) DEVICE — TUBE FRAZIER 5MM 2FT SOFT TIP

## (undated) DEVICE — SUT 4/0 18IN NUROLON BLK B

## (undated) DEVICE — CLOSURE SKIN STERI STRIP 1/2X4

## (undated) DEVICE — BLADE SCALP OPTHL NDL TIP 3MM

## (undated) DEVICE — BURR PRECISION ROUND 4.0MM

## (undated) DEVICE — SPONGE COTTON TRAY 4X4IN

## (undated) DEVICE — STAPLER SKIN PROXIMATE WIDE

## (undated) DEVICE — DRAPE CRANIOTOMY T SURG STRL

## (undated) DEVICE — PENCIL ROCKER SWITCH 10FT CORD

## (undated) DEVICE — BURR ROUND DIAMOND 4.0MM

## (undated) DEVICE — SYR 3CC LUER LOC

## (undated) DEVICE — BLADE OTOLOGY BEAVER 5X84MM

## (undated) DEVICE — SPONGE NEURO PATTIE 1/2X1/2IN

## (undated) DEVICE — DRESSING TRANS 6X8 TEGADERM

## (undated) DEVICE — DRAPE SURGICAL STERI IRRG PCH

## (undated) DEVICE — MARKER SKIN RULER STERILE

## (undated) DEVICE — SYR 10CC LUER LOCK

## (undated) DEVICE — BURR ROUND DIAMOND 6.0MM

## (undated) DEVICE — BLADE TONGUE DEPRESSOR STRL

## (undated) DEVICE — CATH IV INTROCAN 14G X 2.

## (undated) DEVICE — TRAY NEURO OMC

## (undated) DEVICE — SOL IRR NACL .9% 3000ML

## (undated) DEVICE — BLADE SCALP BEAVER 2.5MM ANG

## (undated) DEVICE — BURR PRECISION ROUND 6.0MM

## (undated) DEVICE — KIT SURGIFLO HEMOSTATIC MATRIX

## (undated) DEVICE — DURAPREP SURG SCRUB 26ML

## (undated) DEVICE — SYR 30CC LUER LOCK

## (undated) DEVICE — CATH EDM LUMBAR CLOSED TIP 80

## (undated) DEVICE — ELECTRODE REM PLYHSV RETURN 9

## (undated) DEVICE — SUT VICRYL PLUS 3-0 SH 18IN

## (undated) DEVICE — DRAPE CORETEMP FLD WRM 56X62IN

## (undated) DEVICE — CORD BIPOLAR 12 FOOT

## (undated) DEVICE — BURR ROUND PRECISION 7.5MM

## (undated) DEVICE — EQUISPON ABSORBABLE GELATIN SPONGE

## (undated) DEVICE — DRAPE STERI 32 X 50

## (undated) DEVICE — SPONGE GAUZE 16PLY 4X4

## (undated) DEVICE — CARTRIDGE OIL

## (undated) DEVICE — SUT CTD VICRYL 3-0 PS-2 UND

## (undated) DEVICE — TRAY CATH 1-LYR URIMTR 16FR

## (undated) DEVICE — DIFFUSER

## (undated) DEVICE — BIT DRILL TUBING

## (undated) DEVICE — ELECTRODE NDL